# Patient Record
Sex: MALE | Race: WHITE | Employment: OTHER | URBAN - METROPOLITAN AREA
[De-identification: names, ages, dates, MRNs, and addresses within clinical notes are randomized per-mention and may not be internally consistent; named-entity substitution may affect disease eponyms.]

---

## 2018-08-22 LAB
CHOLESTEROL, TOTAL: 166 MG/DL
CHOLESTEROL/HDL RATIO: ABNORMAL
HDLC SERPL-MCNC: 33 MG/DL (ref 35–70)
LDL CHOLESTEROL CALCULATED: 116 MG/DL (ref 0–160)
TRIGL SERPL-MCNC: 86 MG/DL
VLDLC SERPL CALC-MCNC: 17 MG/DL

## 2019-03-12 ENCOUNTER — HOSPITAL ENCOUNTER (OUTPATIENT)
Dept: ULTRASOUND IMAGING | Age: 54
Discharge: HOME | End: 2019-03-12
Payer: MEDICARE

## 2019-03-12 DIAGNOSIS — R31.0 CLOT HEMATURIA: ICD-10-CM

## 2019-03-12 PROCEDURE — 76770 US EXAM ABDO BACK WALL COMP: CPT

## 2019-04-03 LAB
ANION GAP SERPL CALCULATED.3IONS-SCNC: 14 MMOL/L (ref 3–16)
BUN BLDV-MCNC: 13 MG/DL (ref 7–20)
CALCIUM SERPL-MCNC: 9.7 MG/DL (ref 8.3–10.6)
CHLORIDE BLD-SCNC: 102 MMOL/L (ref 99–110)
CO2: 26 MMOL/L (ref 21–32)
CREAT SERPL-MCNC: <0.5 MG/DL (ref 0.9–1.3)
GFR AFRICAN AMERICAN: >60
GFR NON-AFRICAN AMERICAN: >60
GLUCOSE FASTING: 96 MG/DL (ref 70–99)
POTASSIUM SERPL-SCNC: 4.9 MMOL/L (ref 3.5–5.1)
PROSTATE SPECIFIC ANTIGEN: 29.51 NG/ML (ref 0–4)
SODIUM BLD-SCNC: 142 MMOL/L (ref 136–145)

## 2019-04-30 LAB — HEMOGLOBIN: 14.1 G/DL (ref 13.5–17.5)

## 2019-05-01 ENCOUNTER — TELEPHONE (OUTPATIENT)
Dept: FAMILY MEDICINE CLINIC | Age: 54
End: 2019-05-01

## 2019-05-14 ENCOUNTER — HOSPITAL ENCOUNTER (OUTPATIENT)
Age: 54
Setting detail: SPECIMEN
Discharge: HOME | End: 2019-05-14
Payer: MEDICARE

## 2019-05-14 PROCEDURE — 88342 IMHCHEM/IMCYTCHM 1ST ANTB: CPT

## 2019-05-14 PROCEDURE — 88341 IMHCHEM/IMCYTCHM EA ADD ANTB: CPT

## 2019-06-06 ENCOUNTER — HOSPITAL ENCOUNTER (OUTPATIENT)
Dept: NUCLEAR MEDICINE | Age: 54
Discharge: HOME | End: 2019-06-06
Payer: MEDICARE

## 2019-06-06 DIAGNOSIS — C61 MALIGNANT NEOPLASM OF PROSTATE (HCC): ICD-10-CM

## 2019-06-06 PROCEDURE — 78306 BONE IMAGING WHOLE BODY: CPT

## 2019-06-06 PROCEDURE — 3430000000 HC RX DIAGNOSTIC RADIOPHARMACEUTICAL: Performed by: SPECIALIST

## 2019-06-06 PROCEDURE — A9503 TC99M MEDRONATE: HCPCS | Performed by: SPECIALIST

## 2019-06-06 RX ORDER — TC 99M MEDRONATE 20 MG/10ML
27.3 INJECTION, POWDER, LYOPHILIZED, FOR SOLUTION INTRAVENOUS
Status: COMPLETED | OUTPATIENT
Start: 2019-06-06 | End: 2019-06-06

## 2019-06-06 RX ADMIN — TC 99M MEDRONATE 27.3 MILLICURIE: 20 INJECTION, POWDER, LYOPHILIZED, FOR SOLUTION INTRAVENOUS at 10:07

## 2019-07-23 ENCOUNTER — HOSPITAL ENCOUNTER (OUTPATIENT)
Age: 54
Setting detail: SPECIMEN
Discharge: HOME | End: 2019-07-23
Payer: MEDICARE

## 2019-07-23 LAB
ALBUMIN SERPL-MCNC: 4.8 GM/DL (ref 3.4–5)
ALP BLD-CCNC: 98 IU/L (ref 40–129)
ALT SERPL-CCNC: 28 U/L (ref 10–40)
ANION GAP SERPL CALCULATED.3IONS-SCNC: 12 MMOL/L (ref 4–16)
AST SERPL-CCNC: 22 IU/L (ref 15–37)
BILIRUB SERPL-MCNC: 0.7 MG/DL (ref 0–1)
BUN BLDV-MCNC: 12 MG/DL (ref 6–23)
CALCIUM SERPL-MCNC: 9.7 MG/DL (ref 8.3–10.6)
CHLORIDE BLD-SCNC: 101 MMOL/L (ref 99–110)
CO2: 25 MMOL/L (ref 21–32)
CREAT SERPL-MCNC: 0.3 MG/DL (ref 0.9–1.3)
GFR AFRICAN AMERICAN: >60 ML/MIN/1.73M2
GFR NON-AFRICAN AMERICAN: >60 ML/MIN/1.73M2
GLUCOSE BLD-MCNC: 84 MG/DL (ref 70–99)
POTASSIUM SERPL-SCNC: 4.2 MMOL/L (ref 3.5–5.1)
SODIUM BLD-SCNC: 138 MMOL/L (ref 135–145)
TOTAL PROTEIN: 7.4 GM/DL (ref 6.4–8.2)

## 2019-07-23 PROCEDURE — 80053 COMPREHEN METABOLIC PANEL: CPT

## 2019-08-15 ENCOUNTER — HOSPITAL ENCOUNTER (OUTPATIENT)
Age: 54
Setting detail: SPECIMEN
Discharge: HOME | End: 2019-08-15
Payer: MEDICARE

## 2019-08-15 LAB
ALBUMIN SERPL-MCNC: 4.9 GM/DL (ref 3.4–5)
ALP BLD-CCNC: 114 IU/L (ref 40–128)
ALT SERPL-CCNC: 30 U/L (ref 10–40)
ANION GAP SERPL CALCULATED.3IONS-SCNC: 13 MMOL/L (ref 4–16)
AST SERPL-CCNC: 26 IU/L (ref 15–37)
BILIRUB SERPL-MCNC: 0.8 MG/DL (ref 0–1)
BUN BLDV-MCNC: 11 MG/DL (ref 6–23)
CALCIUM SERPL-MCNC: 10 MG/DL (ref 8.3–10.6)
CHLORIDE BLD-SCNC: 99 MMOL/L (ref 99–110)
CO2: 27 MMOL/L (ref 21–32)
CREAT SERPL-MCNC: 0.3 MG/DL (ref 0.9–1.3)
GFR AFRICAN AMERICAN: >60 ML/MIN/1.73M2
GFR NON-AFRICAN AMERICAN: >60 ML/MIN/1.73M2
GLUCOSE FASTING: 89 MG/DL (ref 70–99)
POTASSIUM SERPL-SCNC: 4.1 MMOL/L (ref 3.5–5.1)
SODIUM BLD-SCNC: 139 MMOL/L (ref 135–145)
TOTAL PROTEIN: 7.1 GM/DL (ref 6.4–8.2)

## 2019-08-15 PROCEDURE — 80053 COMPREHEN METABOLIC PANEL: CPT

## 2019-08-22 ENCOUNTER — HOSPITAL ENCOUNTER (OUTPATIENT)
Age: 54
Setting detail: SPECIMEN
Discharge: HOME | End: 2019-08-22
Payer: MEDICARE

## 2019-08-22 PROCEDURE — 84153 ASSAY OF PSA TOTAL: CPT

## 2019-08-22 PROCEDURE — 84154 ASSAY OF PSA FREE: CPT

## 2019-08-24 LAB
PROSTATE SPECIFIC ANTIGEN FREE: 0.1 NG/ML
PROSTATE SPECIFIC ANTIGEN PERCENT FREE: 11 %
PROSTATE SPECIFIC ANTIGEN PERCENT FREE: NORMAL %
PROSTATE SPECIFIC ANTIGEN: 0.9 NG/ML (ref 0–4)
PROSTATE SPECIFIC ANTIGEN: NORMAL NG/ML (ref 0–4)

## 2019-10-24 ENCOUNTER — OFFICE VISIT (OUTPATIENT)
Dept: FAMILY MEDICINE CLINIC | Age: 54
End: 2019-10-24
Payer: MEDICARE

## 2019-10-24 VITALS — DIASTOLIC BLOOD PRESSURE: 78 MMHG | HEART RATE: 72 BPM | OXYGEN SATURATION: 98 % | SYSTOLIC BLOOD PRESSURE: 120 MMHG

## 2019-10-24 DIAGNOSIS — Z23 INFLUENZA VACCINE NEEDED: ICD-10-CM

## 2019-10-24 DIAGNOSIS — C79.51 PROSTATE CANCER METASTATIC TO BONE (HCC): ICD-10-CM

## 2019-10-24 DIAGNOSIS — G71.00 MUSCULAR DYSTROPHY (HCC): Primary | ICD-10-CM

## 2019-10-24 DIAGNOSIS — E78.2 MIXED HYPERLIPIDEMIA: ICD-10-CM

## 2019-10-24 DIAGNOSIS — Z23 NEED FOR DIPHTHERIA-TETANUS-PERTUSSIS (TDAP) VACCINE: ICD-10-CM

## 2019-10-24 DIAGNOSIS — C61 PROSTATE CANCER METASTATIC TO BONE (HCC): ICD-10-CM

## 2019-10-24 PROBLEM — C67.9 BLADDER CANCER (HCC): Status: ACTIVE | Noted: 2019-10-24

## 2019-10-24 PROBLEM — C67.9 BLADDER CANCER (HCC): Chronic | Status: ACTIVE | Noted: 2019-10-24

## 2019-10-24 PROCEDURE — 90471 IMMUNIZATION ADMIN: CPT | Performed by: PHYSICIAN ASSISTANT

## 2019-10-24 PROCEDURE — 99213 OFFICE O/P EST LOW 20 MIN: CPT | Performed by: PHYSICIAN ASSISTANT

## 2019-10-24 PROCEDURE — 1036F TOBACCO NON-USER: CPT | Performed by: PHYSICIAN ASSISTANT

## 2019-10-24 PROCEDURE — 90715 TDAP VACCINE 7 YRS/> IM: CPT | Performed by: PHYSICIAN ASSISTANT

## 2019-10-24 PROCEDURE — G8482 FLU IMMUNIZE ORDER/ADMIN: HCPCS | Performed by: PHYSICIAN ASSISTANT

## 2019-10-24 PROCEDURE — G8427 DOCREV CUR MEDS BY ELIG CLIN: HCPCS | Performed by: PHYSICIAN ASSISTANT

## 2019-10-24 PROCEDURE — G0008 ADMIN INFLUENZA VIRUS VAC: HCPCS | Performed by: PHYSICIAN ASSISTANT

## 2019-10-24 PROCEDURE — 90686 IIV4 VACC NO PRSV 0.5 ML IM: CPT | Performed by: PHYSICIAN ASSISTANT

## 2019-10-24 PROCEDURE — G8421 BMI NOT CALCULATED: HCPCS | Performed by: PHYSICIAN ASSISTANT

## 2019-10-24 PROCEDURE — 3017F COLORECTAL CA SCREEN DOC REV: CPT | Performed by: PHYSICIAN ASSISTANT

## 2019-10-24 RX ORDER — ABIRATERONE ACETATE 250 MG/1
1000 TABLET ORAL DAILY
COMMUNITY
End: 2020-09-22 | Stop reason: SDUPTHER

## 2019-10-24 RX ORDER — PREDNISONE 1 MG/1
5 TABLET ORAL DAILY
COMMUNITY
End: 2021-04-26 | Stop reason: SDUPTHER

## 2019-10-24 ASSESSMENT — PATIENT HEALTH QUESTIONNAIRE - PHQ9
SUM OF ALL RESPONSES TO PHQ QUESTIONS 1-9: 0
SUM OF ALL RESPONSES TO PHQ9 QUESTIONS 1 & 2: 0
SUM OF ALL RESPONSES TO PHQ QUESTIONS 1-9: 0
1. LITTLE INTEREST OR PLEASURE IN DOING THINGS: 0
2. FEELING DOWN, DEPRESSED OR HOPELESS: 0

## 2019-10-24 ASSESSMENT — ENCOUNTER SYMPTOMS
SHORTNESS OF BREATH: 0
WHEEZING: 0
COUGH: 0

## 2019-10-30 ENCOUNTER — HOSPITAL ENCOUNTER (OUTPATIENT)
Age: 54
Discharge: HOME | End: 2019-10-30
Payer: MEDICARE

## 2019-10-30 ENCOUNTER — HOSPITAL ENCOUNTER (OUTPATIENT)
Dept: NUCLEAR MEDICINE | Age: 54
Discharge: HOME | End: 2019-10-30
Payer: MEDICARE

## 2019-10-30 ENCOUNTER — HOSPITAL ENCOUNTER (OUTPATIENT)
Dept: CT IMAGING | Age: 54
Discharge: HOME | End: 2019-10-30
Payer: MEDICARE

## 2019-10-30 DIAGNOSIS — C61 PROSTATE CANCER (HCC): ICD-10-CM

## 2019-10-30 DIAGNOSIS — C79.51 BONE METASTASIS (HCC): ICD-10-CM

## 2019-10-30 LAB
ALBUMIN SERPL-MCNC: 4.5 GM/DL (ref 3.4–5)
ALP BLD-CCNC: 83 IU/L (ref 40–128)
ALT SERPL-CCNC: 27 U/L (ref 10–40)
ANION GAP SERPL CALCULATED.3IONS-SCNC: 11 MMOL/L (ref 4–16)
ANISOCYTOSIS: ABNORMAL
AST SERPL-CCNC: 29 IU/L (ref 15–37)
BANDED NEUTROPHILS ABSOLUTE COUNT: 0.39 K/CU MM
BANDED NEUTROPHILS RELATIVE PERCENT: 6 % (ref 5–11)
BILIRUB SERPL-MCNC: 0.7 MG/DL (ref 0–1)
BUN BLDV-MCNC: 9 MG/DL (ref 6–23)
CALCIUM SERPL-MCNC: 9.6 MG/DL (ref 8.3–10.6)
CHLORIDE BLD-SCNC: 100 MMOL/L (ref 99–110)
CO2: 26 MMOL/L (ref 21–32)
CREAT SERPL-MCNC: 0.3 MG/DL (ref 0.9–1.3)
DIFFERENTIAL TYPE: ABNORMAL
EOSINOPHILS ABSOLUTE: 0.3 K/CU MM
EOSINOPHILS RELATIVE PERCENT: 4 % (ref 0–3)
GFR AFRICAN AMERICAN: >60 ML/MIN/1.73M2
GFR NON-AFRICAN AMERICAN: >60 ML/MIN/1.73M2
GLUCOSE BLD-MCNC: 89 MG/DL (ref 70–99)
HCT VFR BLD CALC: 41 % (ref 42–52)
HEMOGLOBIN: 13.4 GM/DL (ref 13.5–18)
LYMPHOCYTES ABSOLUTE: 2.1 K/CU MM
LYMPHOCYTES RELATIVE PERCENT: 32 % (ref 24–44)
MCH RBC QN AUTO: 30.9 PG (ref 27–31)
MCHC RBC AUTO-ENTMCNC: 32.7 % (ref 32–36)
MCV RBC AUTO: 94.5 FL (ref 78–100)
MONOCYTES ABSOLUTE: 0.5 K/CU MM
MONOCYTES RELATIVE PERCENT: 7 % (ref 0–4)
PDW BLD-RTO: 12.9 % (ref 11.7–14.9)
PLATELET # BLD: 172 K/CU MM (ref 140–440)
PMV BLD AUTO: 11.6 FL (ref 7.5–11.1)
POTASSIUM SERPL-SCNC: 3.9 MMOL/L (ref 3.5–5.1)
RBC # BLD: 4.34 M/CU MM (ref 4.6–6.2)
RBC # BLD: ABNORMAL 10*6/UL
SEGMENTED NEUTROPHILS ABSOLUTE COUNT: 3.2 K/CU MM
SEGMENTED NEUTROPHILS RELATIVE PERCENT: 51 % (ref 36–66)
SODIUM BLD-SCNC: 137 MMOL/L (ref 135–145)
TOTAL PROTEIN: 6.8 GM/DL (ref 6.4–8.2)
WBC # BLD: 6.5 K/CU MM (ref 4–10.5)
WBC # BLD: ABNORMAL 10*3/UL

## 2019-10-30 PROCEDURE — 84154 ASSAY OF PSA FREE: CPT

## 2019-10-30 PROCEDURE — 84153 ASSAY OF PSA TOTAL: CPT

## 2019-10-30 PROCEDURE — 74177 CT ABD & PELVIS W/CONTRAST: CPT

## 2019-10-30 PROCEDURE — 36415 COLL VENOUS BLD VENIPUNCTURE: CPT

## 2019-10-30 PROCEDURE — 80053 COMPREHEN METABOLIC PANEL: CPT

## 2019-10-30 PROCEDURE — 6360000004 HC RX CONTRAST MEDICATION: Performed by: INTERNAL MEDICINE

## 2019-10-30 PROCEDURE — 85007 BL SMEAR W/DIFF WBC COUNT: CPT

## 2019-10-30 PROCEDURE — A9503 TC99M MEDRONATE: HCPCS | Performed by: INTERNAL MEDICINE

## 2019-10-30 PROCEDURE — 2580000003 HC RX 258: Performed by: INTERNAL MEDICINE

## 2019-10-30 PROCEDURE — 3430000000 HC RX DIAGNOSTIC RADIOPHARMACEUTICAL: Performed by: INTERNAL MEDICINE

## 2019-10-30 PROCEDURE — 78306 BONE IMAGING WHOLE BODY: CPT

## 2019-10-30 PROCEDURE — 85027 COMPLETE CBC AUTOMATED: CPT

## 2019-10-30 RX ORDER — TC 99M MEDRONATE 20 MG/10ML
25 INJECTION, POWDER, LYOPHILIZED, FOR SOLUTION INTRAVENOUS
Status: COMPLETED | OUTPATIENT
Start: 2019-10-30 | End: 2019-10-30

## 2019-10-30 RX ORDER — 0.9 % SODIUM CHLORIDE 0.9 %
10 VIAL (ML) INJECTION
Status: COMPLETED | OUTPATIENT
Start: 2019-10-30 | End: 2019-10-30

## 2019-10-30 RX ADMIN — IOPAMIDOL 75 ML: 755 INJECTION, SOLUTION INTRAVENOUS at 13:14

## 2019-10-30 RX ADMIN — Medication 10 ML: at 13:15

## 2019-10-30 RX ADMIN — IOHEXOL 50 ML: 240 INJECTION, SOLUTION INTRATHECAL; INTRAVASCULAR; INTRAVENOUS; ORAL at 13:14

## 2019-10-30 RX ADMIN — TC 99M MEDRONATE 25 MILLICURIE: 20 INJECTION, POWDER, LYOPHILIZED, FOR SOLUTION INTRAVENOUS at 10:50

## 2019-11-01 LAB
PROSTATE SPECIFIC ANTIGEN FREE: <0.1 NG/ML
PROSTATE SPECIFIC ANTIGEN PERCENT FREE: <50 %
PROSTATE SPECIFIC ANTIGEN PERCENT FREE: NORMAL %
PROSTATE SPECIFIC ANTIGEN: 0.2 NG/ML (ref 0–4)
PROSTATE SPECIFIC ANTIGEN: NORMAL NG/ML (ref 0–4)

## 2019-11-06 ENCOUNTER — HOSPITAL ENCOUNTER (OUTPATIENT)
Age: 54
Discharge: HOME | End: 2019-11-06
Payer: MEDICARE

## 2019-11-06 DIAGNOSIS — C79.51 PROSTATE CANCER METASTATIC TO BONE (HCC): ICD-10-CM

## 2019-11-06 DIAGNOSIS — C61 PROSTATE CANCER METASTATIC TO BONE (HCC): ICD-10-CM

## 2019-11-06 DIAGNOSIS — E78.2 MIXED HYPERLIPIDEMIA: ICD-10-CM

## 2019-11-06 LAB
BASOPHILS ABSOLUTE: 0 K/CU MM
BASOPHILS RELATIVE PERCENT: 0.8 % (ref 0–1)
CHOLESTEROL: 201 MG/DL
DIFFERENTIAL TYPE: ABNORMAL
EOSINOPHILS ABSOLUTE: 0.2 K/CU MM
EOSINOPHILS RELATIVE PERCENT: 3.8 % (ref 0–3)
HCT VFR BLD CALC: 41.1 % (ref 42–52)
HDLC SERPL-MCNC: 40 MG/DL
HEMOGLOBIN: 13.1 GM/DL (ref 13.5–18)
IMMATURE NEUTROPHIL %: 0.4 % (ref 0–0.43)
LDL CHOLESTEROL DIRECT: 146 MG/DL
LYMPHOCYTES ABSOLUTE: 1.9 K/CU MM
LYMPHOCYTES RELATIVE PERCENT: 37.5 % (ref 24–44)
MCH RBC QN AUTO: 31 PG (ref 27–31)
MCHC RBC AUTO-ENTMCNC: 31.9 % (ref 32–36)
MCV RBC AUTO: 97.4 FL (ref 78–100)
MONOCYTES ABSOLUTE: 0.3 K/CU MM
MONOCYTES RELATIVE PERCENT: 6.6 % (ref 0–4)
NUCLEATED RBC %: 0 %
PDW BLD-RTO: 13.2 % (ref 11.7–14.9)
PLATELET # BLD: 168 K/CU MM (ref 140–440)
PMV BLD AUTO: 12.3 FL (ref 7.5–11.1)
RBC # BLD: 4.22 M/CU MM (ref 4.6–6.2)
SEGMENTED NEUTROPHILS ABSOLUTE COUNT: 2.6 K/CU MM
SEGMENTED NEUTROPHILS RELATIVE PERCENT: 50.9 % (ref 36–66)
TOTAL IMMATURE NEUTOROPHIL: 0.02 K/CU MM
TOTAL NUCLEATED RBC: 0 K/CU MM
TRIGL SERPL-MCNC: 113 MG/DL
TSH HIGH SENSITIVITY: 1.81 UIU/ML (ref 0.27–4.2)
WBC # BLD: 5 K/CU MM (ref 4–10.5)

## 2019-11-06 PROCEDURE — 36415 COLL VENOUS BLD VENIPUNCTURE: CPT

## 2019-11-06 PROCEDURE — 85025 COMPLETE CBC W/AUTO DIFF WBC: CPT

## 2019-11-06 PROCEDURE — 83721 ASSAY OF BLOOD LIPOPROTEIN: CPT

## 2019-11-06 PROCEDURE — 84443 ASSAY THYROID STIM HORMONE: CPT

## 2019-11-06 PROCEDURE — 80061 LIPID PANEL: CPT

## 2020-01-21 ENCOUNTER — HOSPITAL ENCOUNTER (OUTPATIENT)
Age: 55
Discharge: HOME | End: 2020-01-21
Payer: MEDICARE

## 2020-01-21 ENCOUNTER — HOSPITAL ENCOUNTER (OUTPATIENT)
Dept: CT IMAGING | Age: 55
Discharge: HOME | End: 2020-01-21
Payer: MEDICARE

## 2020-01-21 LAB
ALBUMIN SERPL-MCNC: 4.3 GM/DL (ref 3.4–5)
ALP BLD-CCNC: 78 IU/L (ref 40–128)
ALT SERPL-CCNC: 21 U/L (ref 10–40)
ANION GAP SERPL CALCULATED.3IONS-SCNC: 11 MMOL/L (ref 4–16)
AST SERPL-CCNC: 24 IU/L (ref 15–37)
BANDED NEUTROPHILS ABSOLUTE COUNT: 0.1 K/CU MM
BANDED NEUTROPHILS RELATIVE PERCENT: 2 % (ref 5–11)
BASOPHILS ABSOLUTE: 0.1 K/CU MM
BASOPHILS RELATIVE PERCENT: 2 % (ref 0–1)
BILIRUB SERPL-MCNC: 0.5 MG/DL (ref 0–1)
BUN BLDV-MCNC: 7 MG/DL (ref 6–23)
CALCIUM SERPL-MCNC: 9 MG/DL (ref 8.3–10.6)
CHLORIDE BLD-SCNC: 101 MMOL/L (ref 99–110)
CO2: 26 MMOL/L (ref 21–32)
CREAT SERPL-MCNC: 0.3 MG/DL (ref 0.9–1.3)
DIFFERENTIAL TYPE: ABNORMAL
EOSINOPHILS ABSOLUTE: 0.1 K/CU MM
EOSINOPHILS RELATIVE PERCENT: 3 % (ref 0–3)
FERRITIN: 456 NG/ML (ref 30–400)
FOLATE: 12 NG/ML (ref 3.1–17.5)
GFR AFRICAN AMERICAN: >60 ML/MIN/1.73M2
GFR AFRICAN AMERICAN: >60 ML/MIN/1.73M2
GFR NON-AFRICAN AMERICAN: >60 ML/MIN/1.73M2
GFR NON-AFRICAN AMERICAN: >60 ML/MIN/1.73M2
GLUCOSE BLD-MCNC: 85 MG/DL (ref 70–99)
HCT VFR BLD CALC: 40.2 % (ref 42–52)
HEMOGLOBIN: 12.9 GM/DL (ref 13.5–18)
IRON: 65 UG/DL (ref 59–158)
LYMPHOCYTES ABSOLUTE: 2 K/CU MM
LYMPHOCYTES RELATIVE PERCENT: 40 % (ref 24–44)
MCH RBC QN AUTO: 30.4 PG (ref 27–31)
MCHC RBC AUTO-ENTMCNC: 32.1 % (ref 32–36)
MCV RBC AUTO: 94.8 FL (ref 78–100)
MONOCYTES ABSOLUTE: 0.3 K/CU MM
MONOCYTES RELATIVE PERCENT: 6 % (ref 0–4)
PCT TRANSFERRIN: 23 % (ref 10–44)
PDW BLD-RTO: 12.7 % (ref 11.7–14.9)
PLATELET # BLD: 145 K/CU MM (ref 140–440)
PLT MORPHOLOGY: ABNORMAL
PMV BLD AUTO: 12.6 FL (ref 7.5–11.1)
POC CREATININE: 0.5 MG/DL (ref 0.9–1.3)
POLYCHROMASIA: ABNORMAL
POTASSIUM SERPL-SCNC: 3.9 MMOL/L (ref 3.5–5.1)
PROSTATE SPECIFIC ANTIGEN: 0.08 NG/ML (ref 0–4)
RBC # BLD: 4.24 M/CU MM (ref 4.6–6.2)
SEGMENTED NEUTROPHILS ABSOLUTE COUNT: 2.3 K/CU MM
SEGMENTED NEUTROPHILS RELATIVE PERCENT: 47 % (ref 36–66)
SODIUM BLD-SCNC: 138 MMOL/L (ref 135–145)
TOTAL IRON BINDING CAPACITY: 285 UG/DL (ref 250–450)
TOTAL PROTEIN: 6.4 GM/DL (ref 6.4–8.2)
UNSATURATED IRON BINDING CAPACITY: 220 UG/DL (ref 110–370)
VITAMIN B-12: 196.3 PG/ML (ref 211–911)
WBC # BLD: 4.9 K/CU MM (ref 4–10.5)
WBC # BLD: ABNORMAL 10*3/UL

## 2020-01-21 PROCEDURE — 36415 COLL VENOUS BLD VENIPUNCTURE: CPT

## 2020-01-21 PROCEDURE — 82607 VITAMIN B-12: CPT

## 2020-01-21 PROCEDURE — G0103 PSA SCREENING: HCPCS

## 2020-01-21 PROCEDURE — 6360000004 HC RX CONTRAST MEDICATION: Performed by: INTERNAL MEDICINE

## 2020-01-21 PROCEDURE — 82728 ASSAY OF FERRITIN: CPT

## 2020-01-21 PROCEDURE — 85007 BL SMEAR W/DIFF WBC COUNT: CPT

## 2020-01-21 PROCEDURE — 83540 ASSAY OF IRON: CPT

## 2020-01-21 PROCEDURE — 82746 ASSAY OF FOLIC ACID SERUM: CPT

## 2020-01-21 PROCEDURE — 71260 CT THORAX DX C+: CPT

## 2020-01-21 PROCEDURE — 85027 COMPLETE CBC AUTOMATED: CPT

## 2020-01-21 PROCEDURE — 83550 IRON BINDING TEST: CPT

## 2020-01-21 PROCEDURE — 80053 COMPREHEN METABOLIC PANEL: CPT

## 2020-01-21 RX ADMIN — IOPAMIDOL 75 ML: 755 INJECTION, SOLUTION INTRAVENOUS at 08:33

## 2020-01-30 ENCOUNTER — HOSPITAL ENCOUNTER (OUTPATIENT)
Dept: INFUSION THERAPY | Age: 55
Discharge: HOME | End: 2020-01-30
Payer: MEDICARE

## 2020-01-30 PROCEDURE — 96372 THER/PROPH/DIAG INJ SC/IM: CPT

## 2020-01-30 PROCEDURE — 6360000002 HC RX W HCPCS: Performed by: INTERNAL MEDICINE

## 2020-01-30 PROCEDURE — 96402 CHEMO HORMON ANTINEOPL SQ/IM: CPT

## 2020-01-30 RX ORDER — CYANOCOBALAMIN 1000 UG/ML
1000 INJECTION INTRAMUSCULAR; SUBCUTANEOUS ONCE
Status: DISCONTINUED | OUTPATIENT
Start: 2020-01-30 | End: 2020-01-31 | Stop reason: HOSPADM

## 2020-02-06 ENCOUNTER — HOSPITAL ENCOUNTER (OUTPATIENT)
Dept: INFUSION THERAPY | Age: 55
Discharge: HOME | End: 2020-02-06
Payer: MEDICARE

## 2020-02-06 PROCEDURE — 6360000002 HC RX W HCPCS

## 2020-02-06 PROCEDURE — 96372 THER/PROPH/DIAG INJ SC/IM: CPT

## 2020-02-06 RX ORDER — CYANOCOBALAMIN 1000 UG/ML
INJECTION INTRAMUSCULAR; SUBCUTANEOUS
Status: DISCONTINUED
Start: 2020-02-06 | End: 2020-02-07 | Stop reason: HOSPADM

## 2020-02-06 RX ORDER — CYANOCOBALAMIN 1000 UG/ML
1000 INJECTION INTRAMUSCULAR; SUBCUTANEOUS ONCE
Status: DISCONTINUED | OUTPATIENT
Start: 2020-02-06 | End: 2020-02-07 | Stop reason: HOSPADM

## 2020-02-13 ENCOUNTER — HOSPITAL ENCOUNTER (OUTPATIENT)
Dept: INFUSION THERAPY | Age: 55
Discharge: HOME | End: 2020-02-13
Payer: MEDICARE

## 2020-02-13 PROCEDURE — 6360000002 HC RX W HCPCS

## 2020-02-13 PROCEDURE — 96372 THER/PROPH/DIAG INJ SC/IM: CPT

## 2020-02-13 PROCEDURE — 99214 OFFICE O/P EST MOD 30 MIN: CPT

## 2020-02-13 RX ORDER — CYANOCOBALAMIN 1000 UG/ML
1000 INJECTION INTRAMUSCULAR; SUBCUTANEOUS ONCE
Status: DISCONTINUED | OUTPATIENT
Start: 2020-02-13 | End: 2020-02-14 | Stop reason: HOSPADM

## 2020-02-13 RX ORDER — CYANOCOBALAMIN 1000 UG/ML
INJECTION INTRAMUSCULAR; SUBCUTANEOUS
Status: DISPENSED
Start: 2020-02-13 | End: 2020-02-13

## 2020-03-31 PROBLEM — D64.9 ANEMIA, UNSPECIFIED: Status: ACTIVE | Noted: 2020-03-31

## 2020-04-27 ENCOUNTER — HOSPITAL ENCOUNTER (OUTPATIENT)
Dept: CT IMAGING | Age: 55
Discharge: HOME | End: 2020-04-27
Payer: MEDICARE

## 2020-04-27 LAB
ALBUMIN SERPL-MCNC: 4.1 GM/DL (ref 3.4–5)
ALP BLD-CCNC: 65 IU/L (ref 40–129)
ALT SERPL-CCNC: 22 U/L (ref 10–40)
ANION GAP SERPL CALCULATED.3IONS-SCNC: 13 MMOL/L (ref 4–16)
AST SERPL-CCNC: 21 IU/L (ref 15–37)
BASOPHILS ABSOLUTE: 0 K/CU MM
BASOPHILS RELATIVE PERCENT: 1 % (ref 0–1)
BILIRUB SERPL-MCNC: 0.4 MG/DL (ref 0–1)
BUN BLDV-MCNC: 8 MG/DL (ref 6–23)
CALCIUM SERPL-MCNC: 8.7 MG/DL (ref 8.3–10.6)
CHLORIDE BLD-SCNC: 102 MMOL/L (ref 99–110)
CO2: 26 MMOL/L (ref 21–32)
CREAT SERPL-MCNC: 0.3 MG/DL (ref 0.9–1.3)
DIFFERENTIAL TYPE: ABNORMAL
EOSINOPHILS ABSOLUTE: 0.1 K/CU MM
EOSINOPHILS RELATIVE PERCENT: 2 % (ref 0–3)
FERRITIN: 307 NG/ML (ref 30–400)
GFR AFRICAN AMERICAN: >60 ML/MIN/1.73M2
GFR AFRICAN AMERICAN: >60 ML/MIN/1.73M2
GFR NON-AFRICAN AMERICAN: >60 ML/MIN/1.73M2
GFR NON-AFRICAN AMERICAN: >60 ML/MIN/1.73M2
GLUCOSE BLD-MCNC: 83 MG/DL (ref 70–99)
HCT VFR BLD CALC: 40.1 % (ref 42–52)
HEMOGLOBIN: 12.6 GM/DL (ref 13.5–18)
LYMPHOCYTES ABSOLUTE: 1.7 K/CU MM
LYMPHOCYTES RELATIVE PERCENT: 36 % (ref 24–44)
MCH RBC QN AUTO: 30.4 PG (ref 27–31)
MCHC RBC AUTO-ENTMCNC: 31.4 % (ref 32–36)
MCV RBC AUTO: 96.9 FL (ref 78–100)
MONOCYTES ABSOLUTE: 0.3 K/CU MM
MONOCYTES RELATIVE PERCENT: 6 % (ref 0–4)
PDW BLD-RTO: 12.5 % (ref 11.7–14.9)
PLATELET # BLD: 132 K/CU MM (ref 140–440)
PLT MORPHOLOGY: ABNORMAL
PMV BLD AUTO: 12.5 FL (ref 7.5–11.1)
POC CREATININE: 0.4 MG/DL (ref 0.9–1.3)
POTASSIUM SERPL-SCNC: 3.5 MMOL/L (ref 3.5–5.1)
PROSTATE SPECIFIC ANTIGEN: 0.04 NG/ML (ref 0–4)
RBC # BLD: 4.14 M/CU MM (ref 4.6–6.2)
SEGMENTED NEUTROPHILS ABSOLUTE COUNT: 2.7 K/CU MM
SEGMENTED NEUTROPHILS RELATIVE PERCENT: 55 % (ref 36–66)
SODIUM BLD-SCNC: 141 MMOL/L (ref 135–145)
TOTAL PROTEIN: 6 GM/DL (ref 6.4–8.2)
VITAMIN B-12: 311.6 PG/ML (ref 211–911)
WBC # BLD: 4.8 K/CU MM (ref 4–10.5)

## 2020-04-27 PROCEDURE — 6360000004 HC RX CONTRAST MEDICATION: Performed by: INTERNAL MEDICINE

## 2020-04-27 PROCEDURE — 82607 VITAMIN B-12: CPT

## 2020-04-27 PROCEDURE — 80053 COMPREHEN METABOLIC PANEL: CPT

## 2020-04-27 PROCEDURE — 82728 ASSAY OF FERRITIN: CPT

## 2020-04-27 PROCEDURE — 85027 COMPLETE CBC AUTOMATED: CPT

## 2020-04-27 PROCEDURE — 36415 COLL VENOUS BLD VENIPUNCTURE: CPT

## 2020-04-27 PROCEDURE — 84153 ASSAY OF PSA TOTAL: CPT

## 2020-04-27 PROCEDURE — 85007 BL SMEAR W/DIFF WBC COUNT: CPT

## 2020-04-27 PROCEDURE — G0103 PSA SCREENING: HCPCS

## 2020-04-27 PROCEDURE — 71260 CT THORAX DX C+: CPT

## 2020-04-27 RX ADMIN — IOPAMIDOL 75 ML: 755 INJECTION, SOLUTION INTRAVENOUS at 09:38

## 2020-05-14 ENCOUNTER — HOSPITAL ENCOUNTER (OUTPATIENT)
Dept: INFUSION THERAPY | Age: 55
Discharge: HOME | End: 2020-05-14
Payer: MEDICARE

## 2020-05-14 PROCEDURE — 99211 OFF/OP EST MAY X REQ PHY/QHP: CPT

## 2020-05-26 ENCOUNTER — PATIENT MESSAGE (OUTPATIENT)
Dept: FAMILY MEDICINE CLINIC | Age: 55
End: 2020-05-26

## 2020-05-26 NOTE — TELEPHONE ENCOUNTER
From: Roni Barone  To: Gal Gomez MD  Sent: 5/26/2020 9:48 AM EDT  Subject: Non-Urgent Medical Question    [employer] sent me another Attending [phone] Statement for my disability to be completed. Please could you get this completed as soon as you and let me know if you can email it to me ([email or web address]) or send it thru [address] I appreciate your time in the matter. Thank you.   Philomena Borrego

## 2020-05-26 NOTE — TELEPHONE ENCOUNTER
Call ptJoseph Costa I have not received any forms to complete yet. I have read and responded to all the correspondences I have received so far.

## 2020-06-16 ENCOUNTER — HOSPITAL ENCOUNTER (OUTPATIENT)
Dept: INFUSION THERAPY | Age: 55
Discharge: HOME | End: 2020-06-16
Payer: MEDICARE

## 2020-06-16 PROCEDURE — 96402 CHEMO HORMON ANTINEOPL SQ/IM: CPT

## 2020-06-16 PROCEDURE — 6360000002 HC RX W HCPCS: Performed by: INTERNAL MEDICINE

## 2020-06-22 PROBLEM — Z71.83 ENCOUNTER FOR NONPROCREATIVE GENETIC COUNSELING: Status: ACTIVE | Noted: 2020-06-22

## 2020-06-22 PROBLEM — Z80.3 FAMILY HISTORY OF MALIGNANT NEOPLASM OF BREAST: Status: ACTIVE | Noted: 2020-06-22

## 2020-06-22 PROBLEM — C61 MALIGNANT NEOPLASM OF PROSTATE (HCC): Status: ACTIVE | Noted: 2020-06-22

## 2020-06-22 PROBLEM — J84.9 INTERSTITIAL PULMONARY DISEASE, UNSPECIFIED (HCC): Status: ACTIVE | Noted: 2020-06-22

## 2020-08-07 ENCOUNTER — HOSPITAL ENCOUNTER (OUTPATIENT)
Dept: CT IMAGING | Age: 55
Discharge: HOME | End: 2020-08-07
Payer: MEDICARE

## 2020-08-07 ENCOUNTER — HOSPITAL ENCOUNTER (OUTPATIENT)
Age: 55
Discharge: HOME | End: 2020-08-07
Payer: MEDICARE

## 2020-08-07 LAB
ALBUMIN SERPL-MCNC: 4.8 GM/DL (ref 3.4–5)
ALP BLD-CCNC: 90 IU/L (ref 40–129)
ALT SERPL-CCNC: 28 U/L (ref 10–40)
ANION GAP SERPL CALCULATED.3IONS-SCNC: 15 MMOL/L (ref 4–16)
AST SERPL-CCNC: 37 IU/L (ref 15–37)
BASOPHILS ABSOLUTE: 0 K/CU MM
BASOPHILS RELATIVE PERCENT: 0.5 % (ref 0–1)
BILIRUB SERPL-MCNC: 0.5 MG/DL (ref 0–1)
BUN BLDV-MCNC: 13 MG/DL (ref 6–23)
CALCIUM SERPL-MCNC: 9.5 MG/DL (ref 8.3–10.6)
CHLORIDE BLD-SCNC: 99 MMOL/L (ref 99–110)
CO2: 27 MMOL/L (ref 21–32)
CREAT SERPL-MCNC: 0.4 MG/DL (ref 0.9–1.3)
DIFFERENTIAL TYPE: ABNORMAL
EOSINOPHILS ABSOLUTE: 0.4 K/CU MM
EOSINOPHILS RELATIVE PERCENT: 5.9 % (ref 0–3)
FERRITIN: 408 NG/ML (ref 30–400)
GFR AFRICAN AMERICAN: >60 ML/MIN/1.73M2
GFR NON-AFRICAN AMERICAN: >60 ML/MIN/1.73M2
GLUCOSE BLD-MCNC: 82 MG/DL (ref 70–99)
HCT VFR BLD CALC: 42.6 % (ref 42–52)
HEMOGLOBIN: 13.7 GM/DL (ref 13.5–18)
IMMATURE NEUTROPHIL %: 0.3 % (ref 0–0.43)
LYMPHOCYTES ABSOLUTE: 1.9 K/CU MM
LYMPHOCYTES RELATIVE PERCENT: 32.5 % (ref 24–44)
MCH RBC QN AUTO: 30.6 PG (ref 27–31)
MCHC RBC AUTO-ENTMCNC: 32.2 % (ref 32–36)
MCV RBC AUTO: 95.3 FL (ref 78–100)
MONOCYTES ABSOLUTE: 0.4 K/CU MM
MONOCYTES RELATIVE PERCENT: 6.2 % (ref 0–4)
NUCLEATED RBC %: 0 %
PDW BLD-RTO: 13.2 % (ref 11.7–14.9)
PLATELET # BLD: 164 K/CU MM (ref 140–440)
PMV BLD AUTO: 12.8 FL (ref 7.5–11.1)
POTASSIUM SERPL-SCNC: 4.1 MMOL/L (ref 3.5–5.1)
PROSTATE SPECIFIC ANTIGEN: 0.01 NG/ML (ref 0–4)
RBC # BLD: 4.47 M/CU MM (ref 4.6–6.2)
SEGMENTED NEUTROPHILS ABSOLUTE COUNT: 3.2 K/CU MM
SEGMENTED NEUTROPHILS RELATIVE PERCENT: 54.6 % (ref 36–66)
SODIUM BLD-SCNC: 141 MMOL/L (ref 135–145)
TOTAL IMMATURE NEUTOROPHIL: 0.02 K/CU MM
TOTAL NUCLEATED RBC: 0 K/CU MM
TOTAL PROTEIN: 7 GM/DL (ref 6.4–8.2)
VITAMIN B-12: 352.4 PG/ML (ref 211–911)
WBC # BLD: 5.9 K/CU MM (ref 4–10.5)

## 2020-08-07 PROCEDURE — 71260 CT THORAX DX C+: CPT

## 2020-08-07 PROCEDURE — 6360000004 HC RX CONTRAST MEDICATION: Performed by: INTERNAL MEDICINE

## 2020-08-07 PROCEDURE — 82607 VITAMIN B-12: CPT

## 2020-08-07 PROCEDURE — 80053 COMPREHEN METABOLIC PANEL: CPT

## 2020-08-07 PROCEDURE — 85025 COMPLETE CBC W/AUTO DIFF WBC: CPT

## 2020-08-07 PROCEDURE — 36415 COLL VENOUS BLD VENIPUNCTURE: CPT

## 2020-08-07 PROCEDURE — 82728 ASSAY OF FERRITIN: CPT

## 2020-08-07 PROCEDURE — G0103 PSA SCREENING: HCPCS

## 2020-08-07 PROCEDURE — 2580000003 HC RX 258: Performed by: INTERNAL MEDICINE

## 2020-08-07 RX ORDER — SODIUM CHLORIDE 0.9 % (FLUSH) 0.9 %
10 SYRINGE (ML) INJECTION PRN
Status: DISCONTINUED | OUTPATIENT
Start: 2020-08-07 | End: 2020-08-08 | Stop reason: HOSPADM

## 2020-08-07 RX ADMIN — SODIUM CHLORIDE, PRESERVATIVE FREE 10 ML: 5 INJECTION INTRAVENOUS at 09:35

## 2020-08-07 RX ADMIN — IOPAMIDOL 75 ML: 755 INJECTION, SOLUTION INTRAVENOUS at 09:35

## 2020-08-19 ENCOUNTER — TELEPHONE (OUTPATIENT)
Dept: ONCOLOGY | Age: 55
End: 2020-08-19

## 2020-08-19 NOTE — TELEPHONE ENCOUNTER
Spoke with pt regarding itching and rash that he believes is a result of the prednisone. Suggested he try claritin and pepcid to treat the itching and rash. Pt will try and see if it resolves within a week.

## 2020-08-19 NOTE — TELEPHONE ENCOUNTER
Patient just left a message stating he is having an allergic reaction to Prednisone, would like for someone to call him right away

## 2020-08-28 NOTE — PROGRESS NOTES
Patient Name: Elise Rooney  Patient : 1965  Patient MRN: [id number]     Primary Oncologist: Jeri West MD  Referring Provider: Ermalinda Cooks, MD     Date of Service: 2020      Chief Complaint:   Chief Complaint   Patient presents with   [address]     He came in for follow-up visit. Patient Active Problem List:     Muscular dystrophy [facility])     Prostate cancer metastatic to bone ([facility] [address]     Anemia, unspecified     Malignant neoplasm of prostate ([facility] [address]     Family history of malignant neoplasm of breast     Encounter for nonprocreative genetic counseling     Interstitial pulmonary disease, unspecified ([facility] [address]     HPI:   Aaron Mullins is a pleasant 65-year-old  male patient was referred for this of the diagnosis of prostate cancer involving the bone, bladder and pelvic lymph node. Initially he presented with gross hematuria in 2019. He went to see family doctor and had an ultrasound of the bladder. 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 Invasive prostatic adenocarcinoma considered less likely. Daphney Long PSA in 2019 was 29.51.  CT abdomen and pelvis on 2019 showed 3.7 cm intraluminal bladder mass likely representing transitional cell carcinoma rather than extension of the prostate. Chest x-ray showed no acute infiltrative process. On May 14, 2019 he underwent cystoscopy with transurethral resection of the bladder tumor, 2 cm in size and transrectal ultrasound-guided prostate needle biopsy. Pathology report of left prostate biopsy showed adenocarcinoma Hunter score 4+4. Number cores positive out of 12. Proportion of prostatic tissue involved by tumor was 17%. Perineural invasion was seen. Pathology report of urinary bladder biopsy showed prostatic adenocarcinoma.   MRI of pelvis on 2019 showed large mass left peripheral zone emphysematous changes with no other acute cardiopulmonary findings. PSA in April 2020 was 0.04. Sister has given B-12 injection. CBC is stable. B-12 improved. He is due for lupron injection in July 2020 and to see Dr Ai Talavera. On September 2, 2020 he came in for follow-up visit. CT chest 8/7/2020:    Stable 7 mm pulmonary nodule seen within the right upper lobe recommend    follow-up CT scan in 1 year's time. PSA 0.01 in August 2020. Bone scan and CT abdomen would be optional for now unless his PSA starts rising or he has symptoms suggesting of progression of the disease. Juvenal Hanley He has nonspecific rest which she attributes to the prednisone. He takes only 5 mg a day. Weight has been fluctuating but stable. He takes Claritin. He is due for Lupron injection in September 2020. Spouse has given B12 injection at home. He denies any acute pain. No nausea, vomiting or diarrhea. No dysuria or hematuria. No cough or headache. Due to muscular dystrophy here he has been using wheelchair. Past Medical History  Prostate cancer Stacey score 8, Bronson's muscular dystrophy. Surgical History  Colonoscopy and extirpation of lesion of colon     Allergies  bupropion     Medications  Reviewed as per chart. Social History  He smoked 1 to 2 pack/day for more than 20 years and quit on August 22, 2012. He denies any alcohol or illicit drug use. He has twin sons. Family History  Father: Colorectal cancer  Mother: Breast cancer  Grandfather - Maternal (2nd Degree): Lung cancer  Grandmother - Maternal (2nd Degree): Lung cancer. Current Therapy  Abiraterone + Prednisone + Leuprolide Q3M Cycle Length: 84 Number Cycles: 8 Start: C1D1 on 07/17/2019 Assoc Dx: Prostate cancer LOT: Primary Treatment Stage: JOSE Treatment Intent: Dyyugujw22/30/2020 C1 D1  Leuprolide IM (3 month), 22.5 mg  Prednisone Oral, 5 mg  Abiraterone Oral, 1000 mg qday    Review of Systems:   \"Per interval history; otherwise 10 point ROS is 01/21/2020    WBCMORP OCCASIONAL  ATYPICAL LYMPH(S) NOTED   01/21/2020    PLTM FEW 04/27/2020     Chemistry:  Lab Results   Component Value Date     08/07/2020    K 4.1 08/07/2020    CL 99 08/07/2020    CO2 27 08/07/2020    BUN 13 08/07/2020    CREATININE 0.4 (L) 08/07/2020    GLUCOSE 82 08/07/2020    CALCIUM 9.5 08/07/2020    PROT 7.0 08/07/2020    LABALBU 4.8 08/07/2020    BILITOT 0.5 08/07/2020    ALKPHOS 90 08/07/2020    AST 37 08/07/2020    ALT 28 08/07/2020    LABGLOM >60 08/07/2020    GFRAA >60 08/07/2020     Lab Results   Component Value Date     04/30/2011     No components found for: LD  Lab Results   Component Value Date    TSHHS 1.810 11/06/2019     Immunology:  Lab Results   Component Value Date    PROT 7.0 08/07/2020     Anemia Panel:  Lab Results   Component Value Date    ZCDCXNTF27 352.4 08/07/2020    FOLATE 12.0 01/21/2020     Tumor Markers:  Lab Results   Component Value Date    PSA 0.01 08/07/2020      Observations:  PHQ-9 Total Score: 0 (9/2/2020 11:07 AM)     Assessment & Plan:    1. He has treatment naïve metastatic prostate cancer with involvement of the bone, baldder and pelvic lymph node. We went over NCCN guideline. Due to the lack of visceral disease, I offer with Lupron and Zytiga plus prednisone. He understand the palliative goal of the therapy. I plan to have follow-up imaging study and PSA in 3 months. Urologist started him on Eligard. He is due for eligard in January 2020. He started zytiga and prednisone in July 2019. PSA in August 2019 was 0.9. PSA in October 2019 was 0.2. Clinically he responded. CT abdomen and pelvis and bone scan in October 2019 showed stable findings. CT chest in January 2020 showed 0.7 cm cavitary lesion. PSA in January 2020 was 0.08. PSA in April 2020 was 0.08. Labs in August 2020 were reviewed. He will continue with Lupron plus Zytiga and prednisone. I plan to have follow-up blood test with the next office visit.   Bone scan, CT abdomen pelvis would be optional for now. 2.  CT chest in July 2019 showed nonspecific 0.8 cm RUL nodule. F/u in 6 months was recommended. He will have follow-up CT chest in August 2021. 3. He has Bronson's muscular dystrophy. I refer him to see Genetic counselor-   Anna Burt has discussed with him. 4. We discussed about healthy diet and lifestyle. 5. B-12 deficiency. He will continue with B-12 injection. Spouse has given B-12 injection. Return to clinic in 3 months or sooner. All of his question has been answered for today. Recent imaging and labs were reviewed and discussed with the patient.       Electronically signed by Myra Wilson MD on 8/28/20 at 9:06 AM NOHEMI

## 2020-09-02 ENCOUNTER — HOSPITAL ENCOUNTER (OUTPATIENT)
Dept: INFUSION THERAPY | Age: 55
Discharge: HOME | End: 2020-09-02
Payer: MEDICARE

## 2020-09-02 ENCOUNTER — OFFICE VISIT (OUTPATIENT)
Dept: ONCOLOGY | Age: 55
End: 2020-09-02
Payer: MEDICARE

## 2020-09-02 VITALS
HEART RATE: 86 BPM | BODY MASS INDEX: 24.29 KG/M2 | RESPIRATION RATE: 16 BRPM | WEIGHT: 164 LBS | OXYGEN SATURATION: 97 % | DIASTOLIC BLOOD PRESSURE: 85 MMHG | TEMPERATURE: 97.3 F | HEIGHT: 69 IN | SYSTOLIC BLOOD PRESSURE: 145 MMHG

## 2020-09-02 PROCEDURE — 99211 OFF/OP EST MAY X REQ PHY/QHP: CPT

## 2020-09-02 PROCEDURE — G8420 CALC BMI NORM PARAMETERS: HCPCS | Performed by: INTERNAL MEDICINE

## 2020-09-02 PROCEDURE — 3017F COLORECTAL CA SCREEN DOC REV: CPT | Performed by: INTERNAL MEDICINE

## 2020-09-02 PROCEDURE — 99213 OFFICE O/P EST LOW 20 MIN: CPT | Performed by: INTERNAL MEDICINE

## 2020-09-02 PROCEDURE — G8427 DOCREV CUR MEDS BY ELIG CLIN: HCPCS | Performed by: INTERNAL MEDICINE

## 2020-09-02 PROCEDURE — 1036F TOBACCO NON-USER: CPT | Performed by: INTERNAL MEDICINE

## 2020-09-02 ASSESSMENT — PATIENT HEALTH QUESTIONNAIRE - PHQ9
SUM OF ALL RESPONSES TO PHQ QUESTIONS 1-9: 0
2. FEELING DOWN, DEPRESSED OR HOPELESS: 0
1. LITTLE INTEREST OR PLEASURE IN DOING THINGS: 0
SUM OF ALL RESPONSES TO PHQ QUESTIONS 1-9: 0
SUM OF ALL RESPONSES TO PHQ9 QUESTIONS 1 & 2: 0

## 2020-09-02 NOTE — PROGRESS NOTES
MA Rooming Questions  Patient: Zuly Ayoub  MRN: [id number]    Date: 9/2/2020        1. Do you have any new issues?   no         2. Do you need any refills on medications?    no    3. Have you had any imaging done since your last visit?   no    4. Have you been hospitalized or seen in the emergency room since your last visit here?   no    5. Did the patient have a depression screening completed today?  Yes    PHQ-9 Total Score: 0 (9/2/2020 11:07 AM)       PHQ-9 Given to (if applicable):               PHQ-9 Score (if applicable):                     [] Positive     []  Negative              Does question #9 need addressed (if applicable)                     [] Yes    []  No               Rhea Salazar MA

## 2020-09-08 ENCOUNTER — HOSPITAL ENCOUNTER (OUTPATIENT)
Dept: INFUSION THERAPY | Age: 55
Discharge: HOME | End: 2020-09-08
Payer: MEDICARE

## 2020-09-08 DIAGNOSIS — C61 MALIGNANT NEOPLASM OF PROSTATE (HCC): Primary | ICD-10-CM

## 2020-09-08 LAB
ALBUMIN SERPL-MCNC: 4.6 GM/DL (ref 3.4–5)
ALP BLD-CCNC: 88 IU/L (ref 40–128)
ALT SERPL-CCNC: 30 U/L (ref 10–40)
ANION GAP SERPL CALCULATED.3IONS-SCNC: 14 MMOL/L (ref 4–16)
AST SERPL-CCNC: 27 IU/L (ref 15–37)
BASOPHILS ABSOLUTE: 0 K/CU MM
BASOPHILS RELATIVE PERCENT: 0.3 % (ref 0–1)
BILIRUB SERPL-MCNC: 0.6 MG/DL (ref 0–1)
BUN BLDV-MCNC: 9 MG/DL (ref 6–23)
CALCIUM SERPL-MCNC: 9.5 MG/DL (ref 8.3–10.6)
CHLORIDE BLD-SCNC: 101 MMOL/L (ref 99–110)
CO2: 24 MMOL/L (ref 21–32)
CREAT SERPL-MCNC: 0.3 MG/DL (ref 0.9–1.3)
DIFFERENTIAL TYPE: ABNORMAL
EOSINOPHILS ABSOLUTE: 0.3 K/CU MM
EOSINOPHILS RELATIVE PERCENT: 4.4 % (ref 0–3)
GFR AFRICAN AMERICAN: >60 ML/MIN/1.73M2
GFR NON-AFRICAN AMERICAN: >60 ML/MIN/1.73M2
GLUCOSE BLD-MCNC: 102 MG/DL (ref 70–99)
HCT VFR BLD CALC: 38.3 % (ref 42–52)
HEMOGLOBIN: 12.9 GM/DL (ref 13.5–18)
LYMPHOCYTES ABSOLUTE: 1.8 K/CU MM
LYMPHOCYTES RELATIVE PERCENT: 26.7 % (ref 24–44)
MCH RBC QN AUTO: 31 PG (ref 27–31)
MCHC RBC AUTO-ENTMCNC: 33.7 % (ref 32–36)
MCV RBC AUTO: 92.1 FL (ref 78–100)
MONOCYTES ABSOLUTE: 0.5 K/CU MM
MONOCYTES RELATIVE PERCENT: 6.9 % (ref 0–4)
PDW BLD-RTO: 13.6 % (ref 11.7–14.9)
PLATELET # BLD: 175 K/CU MM (ref 140–440)
PMV BLD AUTO: 11.5 FL (ref 7.5–11.1)
POTASSIUM SERPL-SCNC: 4.2 MMOL/L (ref 3.5–5.1)
RBC # BLD: 4.16 M/CU MM (ref 4.6–6.2)
SEGMENTED NEUTROPHILS ABSOLUTE COUNT: 4.2 K/CU MM
SEGMENTED NEUTROPHILS RELATIVE PERCENT: 61.7 % (ref 36–66)
SODIUM BLD-SCNC: 139 MMOL/L (ref 135–145)
TOTAL PROTEIN: 6.7 GM/DL (ref 6.4–8.2)
WBC # BLD: 6.8 K/CU MM (ref 4–10.5)

## 2020-09-08 PROCEDURE — 80053 COMPREHEN METABOLIC PANEL: CPT

## 2020-09-08 PROCEDURE — 85025 COMPLETE CBC W/AUTO DIFF WBC: CPT

## 2020-09-08 PROCEDURE — 84154 ASSAY OF PSA FREE: CPT

## 2020-09-08 PROCEDURE — 84153 ASSAY OF PSA TOTAL: CPT

## 2020-09-08 PROCEDURE — 36415 COLL VENOUS BLD VENIPUNCTURE: CPT

## 2020-09-08 PROCEDURE — 96402 CHEMO HORMON ANTINEOPL SQ/IM: CPT

## 2020-09-08 PROCEDURE — 6360000002 HC RX W HCPCS: Performed by: INTERNAL MEDICINE

## 2020-09-08 RX ADMIN — LEUPROLIDE ACETATE 22.5 MG: KIT at 14:49

## 2020-09-08 NOTE — PROGRESS NOTES
Here for Lupron injection. Voiced he's been getting hives from prednisone, benefits out weight risk, taking Claritin and prevacid for histamine with treatment. Denies needs at this time. Injection administered as ordered. Tolerated well. Appt card given. Discharged in stable condition.

## 2020-09-09 LAB
PROSTATE SPECIFIC ANTIGEN FREE: <0.1 NG/ML
PROSTATE SPECIFIC ANTIGEN PERCENT FREE: NORMAL %
PROSTATE SPECIFIC ANTIGEN: <0.1 NG/ML (ref 0–4)

## 2020-09-18 RX ORDER — ABIRATERONE ACETATE 250 MG/1
TABLET ORAL
Qty: 120 TABLET | Refills: 0 | Status: CANCELLED | OUTPATIENT
Start: 2020-09-18

## 2020-09-18 NOTE — TELEPHONE ENCOUNTER
Patient called to have a refill on Zytiga. Please send to '[facility] pharmacy.     Thank you,    Srinivas Nathan

## 2020-09-22 RX ORDER — ABIRATERONE ACETATE 250 MG/1
1000 TABLET ORAL DAILY
Qty: 120 TABLET | Refills: 3 | Status: SHIPPED | OUTPATIENT
Start: 2020-09-22 | End: 2021-01-04 | Stop reason: SDUPTHER

## 2020-09-24 ENCOUNTER — OFFICE VISIT (OUTPATIENT)
Dept: FAMILY MEDICINE CLINIC | Age: 55
End: 2020-09-24
Payer: MEDICARE

## 2020-09-24 VITALS — HEART RATE: 80 BPM | TEMPERATURE: 98.1 F | DIASTOLIC BLOOD PRESSURE: 82 MMHG | SYSTOLIC BLOOD PRESSURE: 124 MMHG

## 2020-09-24 PROCEDURE — G0008 ADMIN INFLUENZA VIRUS VAC: HCPCS | Performed by: FAMILY MEDICINE

## 2020-09-24 PROCEDURE — 99214 OFFICE O/P EST MOD 30 MIN: CPT | Performed by: FAMILY MEDICINE

## 2020-09-24 PROCEDURE — 90686 IIV4 VACC NO PRSV 0.5 ML IM: CPT | Performed by: FAMILY MEDICINE

## 2020-09-24 NOTE — PROGRESS NOTES
Vaccine Information Sheet, \"Influenza - Inactivated\"  given to Carley Melton, or parent/legal guardian of  Carley Melton and verbalized understanding. Patient responses:    Have you ever had a reaction to a flu vaccine? No  Do you have any current illness? No  Have you ever had [place] Syndrome? No  Do you have a serious allergy to any of the follow: Neomycin, Polymyxin, Thimerosal, eggs or egg products? No    Flu vaccine given per order. Please see immunization tab. Risks and benefits explained. Current VIS given.

## 2020-09-24 NOTE — PROGRESS NOTES
9/24/2020    Bran Thompson    Chief Complaint   Patient presents with   Peter Bro Other     rov    Urticaria     d/t prednisone. pt currently taking famotidine & loratidine - helps slightly       HPI    Jeanne Block is a 63 y.o. male who presents today with follow-up. Concerning patient's muscular dystrophy. Patient feels that he is holding his own. It appears he is have a little more muscle wasting. His blood pressure is good today. Patient denies shortness of breath. I offered patient inhaler. He does not wish to have any is he feels he does not need him. This was in reference to patient's diagnosis of interstitial pulmonary disease unspecified. Patient wished to have his PSA numbers reviewed. We went over the last year and a half which showed a tremendous decrease. Is currently at less than 0.01. Patient is very pleased with therapy. Patient is very disappointed to have urticaria appearing. He is only on chemotherapy and prednisone. His oncologist feels is due to the prednisone. He cannot take 1 without the other. Either 10 or 20 once a day and Claritin 10 daily. I asked him to double both of his current medications.     REVIEW OF SYSTEMS    Constitutional:  Denies fever, chills, weight loss or weakness  Eyes:  no photophobia or discharge  ENT:  no sore throat or ear pain  Cardiovascular:  Denies chest pain, palpitations or swelling  Respiratory:  Denies cough or shortness of breath  GI:  no abdominal pain, nausea, vomiting, or diarrhea  Musculoskeletal:  no back pain  Skin:  No rashes  Neurologic:  no headache, focal weakness, or sensory changes  Endocrine:  no polyuria or polydipsia      PAST MEDICAL HISTORY  Past Medical History:   Diagnosis Date    Mixed hyperlipidemia 10/24/2019       FAMILY HISTORY  Family History   Problem Relation Age of Onset    Hypertension Mother     Colon Cancer Father     Cancer Father     Hypertension Sister     Hypertension Maternal Grandmother        SOCIAL Temp 98.1 °F (36.7 °C)     Constitutional:  Well developed, well nourished  HEENT:  Normocephalic, atraumatic, bilateral external ears normal, oropharynx moist, nose normal  Neck:  Normal range of motion, no tenderness, supple  Lymphatic:  No lymphadenopathy noted  Cardiovascular:  Normal heart rate, S1S2 nl  Thorax & Lungs:  Normal breath sounds, no respiratory distress, no wheezing  Skin: Patient has some urticarial looking lesions. Back:  straight  Extremities:  No edema, no tenderness, no cyanosis  Musculoskeletal:  Good range of motion   Neurologic:  Alert & oriented X 3      ASSESSMENT & PLAN    1. Muscular dystrophy ([address]  Continually progressive. Patient with a neurologist.  Muriel John a long time discussing avoidance of COVID. Patient has good understanding. 2. Interstitial pulmonary disease, unspecified ([address]  Patient declines further medicine and evaluation at this time. I do not think that is a wise choice. We will continue to encourage. 3. Prostate cancer metastatic to bone [facility])  Great improvement. Continue the same. 4. Needs flu shot  - Influenza, Quadv, 3 yrs and older, IM, PF, Prefill Syr or SDV, 0.5mL (AFLURIA QUADV, PF)    5. Acute urticaria  Double his Pepcid and Claritin to twice daily. Unclear what we add on from there.   Could consider an allergist.           Electronically signed by Talha Enriquez MD on 9/24/2020

## 2020-12-02 ENCOUNTER — HOSPITAL ENCOUNTER (OUTPATIENT)
Dept: INFUSION THERAPY | Age: 55
Discharge: HOME | End: 2020-12-02
Payer: MEDICARE

## 2020-12-02 ENCOUNTER — OFFICE VISIT (OUTPATIENT)
Dept: ONCOLOGY | Age: 55
End: 2020-12-02
Payer: MEDICARE

## 2020-12-02 VITALS
HEART RATE: 86 BPM | HEIGHT: 69 IN | SYSTOLIC BLOOD PRESSURE: 136 MMHG | RESPIRATION RATE: 18 BRPM | WEIGHT: 165 LBS | BODY MASS INDEX: 24.44 KG/M2 | DIASTOLIC BLOOD PRESSURE: 78 MMHG | OXYGEN SATURATION: 98 %

## 2020-12-02 DIAGNOSIS — C61 MALIGNANT NEOPLASM OF PROSTATE (HCC): ICD-10-CM

## 2020-12-02 DIAGNOSIS — R53.83 FATIGUE, UNSPECIFIED TYPE: ICD-10-CM

## 2020-12-02 LAB
ALBUMIN SERPL-MCNC: 4.4 GM/DL (ref 3.4–5)
ALP BLD-CCNC: 73 IU/L (ref 40–129)
ALT SERPL-CCNC: 29 U/L (ref 10–40)
ANION GAP SERPL CALCULATED.3IONS-SCNC: 14 MMOL/L (ref 4–16)
AST SERPL-CCNC: 25 IU/L (ref 15–37)
BASOPHILS ABSOLUTE: 0 K/CU MM
BASOPHILS RELATIVE PERCENT: 0.3 % (ref 0–1)
BILIRUB SERPL-MCNC: 0.5 MG/DL (ref 0–1)
BUN BLDV-MCNC: 7 MG/DL (ref 6–23)
CALCIUM SERPL-MCNC: 9.1 MG/DL (ref 8.3–10.6)
CHLORIDE BLD-SCNC: 102 MMOL/L (ref 99–110)
CO2: 23 MMOL/L (ref 21–32)
CREAT SERPL-MCNC: 0.3 MG/DL (ref 0.9–1.3)
DIFFERENTIAL TYPE: ABNORMAL
EOSINOPHILS ABSOLUTE: 0.2 K/CU MM
EOSINOPHILS RELATIVE PERCENT: 3.7 % (ref 0–3)
FOLATE: 18.1 NG/ML (ref 3.1–17.5)
GFR AFRICAN AMERICAN: >60 ML/MIN/1.73M2
GFR NON-AFRICAN AMERICAN: >60 ML/MIN/1.73M2
GLUCOSE BLD-MCNC: 102 MG/DL (ref 70–99)
HCT VFR BLD CALC: 40.7 % (ref 42–52)
HEMOGLOBIN: 13.5 GM/DL (ref 13.5–18)
LYMPHOCYTES ABSOLUTE: 1.2 K/CU MM
LYMPHOCYTES RELATIVE PERCENT: 19.5 % (ref 24–44)
MCH RBC QN AUTO: 30.4 PG (ref 27–31)
MCHC RBC AUTO-ENTMCNC: 33.2 % (ref 32–36)
MCV RBC AUTO: 91.7 FL (ref 78–100)
MONOCYTES ABSOLUTE: 0.3 K/CU MM
MONOCYTES RELATIVE PERCENT: 5 % (ref 0–4)
PDW BLD-RTO: 13.4 % (ref 11.7–14.9)
PLATELET # BLD: 148 K/CU MM (ref 140–440)
PMV BLD AUTO: 11.5 FL (ref 7.5–11.1)
POTASSIUM SERPL-SCNC: 4.1 MMOL/L (ref 3.5–5.1)
RBC # BLD: 4.44 M/CU MM (ref 4.6–6.2)
SEGMENTED NEUTROPHILS ABSOLUTE COUNT: 4.4 K/CU MM
SEGMENTED NEUTROPHILS RELATIVE PERCENT: 71.5 % (ref 36–66)
SODIUM BLD-SCNC: 139 MMOL/L (ref 135–145)
TOTAL PROTEIN: 6.8 GM/DL (ref 6.4–8.2)
VITAMIN B-12: 443 PG/ML (ref 211–911)
WBC # BLD: 6.2 K/CU MM (ref 4–10.5)

## 2020-12-02 PROCEDURE — 82607 VITAMIN B-12: CPT

## 2020-12-02 PROCEDURE — 99211 OFF/OP EST MAY X REQ PHY/QHP: CPT

## 2020-12-02 PROCEDURE — 36415 COLL VENOUS BLD VENIPUNCTURE: CPT

## 2020-12-02 PROCEDURE — 1036F TOBACCO NON-USER: CPT | Performed by: NURSE PRACTITIONER

## 2020-12-02 PROCEDURE — G8420 CALC BMI NORM PARAMETERS: HCPCS | Performed by: NURSE PRACTITIONER

## 2020-12-02 PROCEDURE — G8427 DOCREV CUR MEDS BY ELIG CLIN: HCPCS | Performed by: NURSE PRACTITIONER

## 2020-12-02 PROCEDURE — 99213 OFFICE O/P EST LOW 20 MIN: CPT | Performed by: NURSE PRACTITIONER

## 2020-12-02 PROCEDURE — 82746 ASSAY OF FOLIC ACID SERUM: CPT

## 2020-12-02 PROCEDURE — 84153 ASSAY OF PSA TOTAL: CPT

## 2020-12-02 PROCEDURE — 84154 ASSAY OF PSA FREE: CPT

## 2020-12-02 PROCEDURE — 85025 COMPLETE CBC W/AUTO DIFF WBC: CPT

## 2020-12-02 PROCEDURE — 80053 COMPREHEN METABOLIC PANEL: CPT

## 2020-12-02 PROCEDURE — 3017F COLORECTAL CA SCREEN DOC REV: CPT | Performed by: NURSE PRACTITIONER

## 2020-12-02 PROCEDURE — G8482 FLU IMMUNIZE ORDER/ADMIN: HCPCS | Performed by: NURSE PRACTITIONER

## 2020-12-02 RX ORDER — LORATADINE 10 MG/1
10 CAPSULE, LIQUID FILLED ORAL DAILY
COMMUNITY
End: 2022-05-03

## 2020-12-02 RX ORDER — CYANOCOBALAMIN 1000 UG/ML
INJECTION INTRAMUSCULAR; SUBCUTANEOUS
COMMUNITY
Start: 2020-11-22 | End: 2021-07-23

## 2020-12-02 RX ORDER — FAMOTIDINE 20 MG/1
20 TABLET, FILM COATED ORAL 2 TIMES DAILY
COMMUNITY
End: 2022-05-03

## 2020-12-02 NOTE — PROGRESS NOTES
Patient Name: Abdiel Sarkar  Patient : 1965  Patient MRN: [id number]     Primary Oncologist: Angi Ron MD  Referring Provider: Matteo Mendoza MD     Date of Service: 2020      Chief Complaint:   Chief Complaint   Patient presents with    Follow-up     He came in for follow-up visit. Patient Active Problem List:     Muscular dystrophy [facility])     Prostate cancer metastatic to bone ([facility] [address]     Anemia, unspecified     Malignant neoplasm of prostate ([facility] [address]     Family history of malignant neoplasm of breast     Encounter for nonprocreative genetic counseling     Interstitial pulmonary disease, unspecified ([facility] [address]     HPI:   Jess Cruz is a pleasant 53-year-old  male patient was referred for this of the diagnosis of prostate cancer involving the bone, bladder and pelvic lymph node. Initially he presented with gross hematuria in 2019. He went to see family doctor and had an ultrasound of the bladder. 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 Invasive prostatic adenocarcinoma considered less likely. Opal Money PSA in 2019 was 29.51.  CT abdomen and pelvis on 2019 showed 3.7 cm intraluminal bladder mass likely representing transitional cell carcinoma rather than extension of the prostate. Chest x-ray showed no acute infiltrative process. On May 14, 2019 he underwent cystoscopy with transurethral resection of the bladder tumor, 2 cm in size and transrectal ultrasound-guided prostate needle biopsy. Pathology report of left prostate biopsy showed adenocarcinoma Windsor score 4+4. Number cores positive out of 12. Proportion of prostatic tissue involved by tumor was 17%. Perineural invasion was seen. Pathology report of urinary bladder biopsy showed prostatic adenocarcinoma.   MRI of pelvis on 2019 showed large mass left peripheral zone emphysematous changes with no other acute cardiopulmonary findings. PSA in April 2020 was 0.04. Sister has given B-12 injection. CBC is stable. B-12 improved. He is due for lupron injection in July 2020 and to see Dr Brett Grijalva. On September 2, 2020 he came in for follow-up visit. CT chest 8/7/2020:    Stable 7 mm pulmonary nodule seen within the right upper lobe recommend    follow-up CT scan in 1 year's time. PSA 0.01 in August 2020. Bone scan and CT abdomen would be optional for now unless his PSA starts rising or he has symptoms suggesting of progression of the disease. Jaja Jenkins He has nonspecific rest which he attributes to the prednisone. He takes only 5 mg a day. Weight has been fluctuating but stable. He takes Claritin. He is due for Lupron injection in September 2020. Spouse has given B12 injection at home. Presented for scheduled follow up on 12/2/2020. He reports he has ongoing hives with prednisone of which he is taking 5 mg daily. He uses pepcid bid and claritin. He has occasional hot flushes. He will have labs today including PSA. He is requesting b12 level as well. He has no new symptoms concerning for progression of disease. He denies any acute pain. No nausea, vomiting or diarrhea. No dysuria or hematuria. No cough or headache. Due to muscular dystrophy here he has been using wheelchair. Past Medical History  Prostate cancer Biola score 8, Bronson's muscular dystrophy. Surgical History  Colonoscopy and extirpation of lesion of colon     Allergies  bupropion     Medications  Reviewed as per chart. Social History  He smoked 1 to 2 pack/day for more than 20 years and quit on August 22, 2012. He denies any alcohol or illicit drug use. He has twin sons. Family History  Father: Colorectal cancer  Mother: Breast cancer  Grandfather - Maternal (2nd Degree): Lung cancer  Grandmother - Maternal (2nd Degree): Lung cancer.      Current Therapy  Abiraterone + Prednisone + Leuprolide Q3M 2 (L) 01/21/2020    SEGSPCT 61.7 09/08/2020    EOSRELPCT 4.4 (H) 09/08/2020    BASOPCT 0.3 09/08/2020    LYMPHOPCT 26.7 09/08/2020    MONOPCT 6.9 (H) 09/08/2020    BANDABS 0.10 01/21/2020    SEGSABS 4.2 09/08/2020    EOSABS 0.3 09/08/2020    BASOSABS 0.0 09/08/2020    LYMPHSABS 1.8 09/08/2020    MONOSABS 0.5 09/08/2020    DIFFTYPE AUTOMATED DIFFERENTIAL 09/08/2020    ANISOCYTOSIS 1+ 10/30/2019    POLYCHROM 1+ 01/21/2020    WBCMORP OCCASIONAL  ATYPICAL LYMPH(S) NOTED   01/21/2020    PLTM FEW 04/27/2020     Chemistry:  Lab Results   Component Value Date     09/08/2020    K 4.2 09/08/2020     09/08/2020    CO2 24 09/08/2020    BUN 9 09/08/2020    CREATININE 0.3 (L) 09/08/2020    GLUCOSE 102 (H) 09/08/2020    CALCIUM 9.5 09/08/2020    PROT 6.7 09/08/2020    LABALBU 4.6 09/08/2020    BILITOT 0.6 09/08/2020    ALKPHOS 88 09/08/2020    AST 27 09/08/2020    ALT 30 09/08/2020    LABGLOM >60 09/08/2020    GFRAA >60 09/08/2020     Lab Results   Component Value Date     04/30/2011     No components found for: LD  Lab Results   Component Value Date    TSHHS 1.810 11/06/2019     Immunology:  Lab Results   Component Value Date    PROT 6.7 09/08/2020     Anemia Panel:  Lab Results   Component Value Date    YXOKHYSY31 352.4 08/07/2020    FOLATE 12.0 01/21/2020     Tumor Markers:  Lab Results   Component Value Date    PSA <0.1 09/08/2020      Observations:  No data recorded     Assessment & Plan:    1. He has treatment naïve metastatic prostate cancer with involvement of the bone, baldder and pelvic lymph node. We went over NCCN guideline. Due to the lack of visceral disease, I offer with Lupron and Zytiga plus prednisone. He understand the palliative goal of the therapy. I plan to have follow-up imaging study and PSA in 3 months. Urologist started him on Eligard. He is due for eligard in January 2020. He started zytiga and prednisone in July 2019. PSA in August 2019 was 0.9. PSA in October 2019 was 0.2. Clinically he responded. CT abdomen and pelvis and bone scan in October 2019 showed stable findings. CT chest in January 2020 showed 0.7 cm cavitary lesion. PSA in January 2020 was 0.08. PSA in April 2020 was 0.08. Labs in August 2020 were reviewed. He will continue with Lupron plus Zytiga and prednisone. I plan to have follow-up blood test with the next office visit. Bone scan, CT abdomen pelvis would be optional for now. Compliant with medications. Due for Lupron next week. He will have labs today including PSA. 2.  CT chest in July 2019 showed nonspecific 0.8 cm RUL nodule. F/u in 6 months was recommended. He will have follow-up CT chest in August 2021. 3. He has Bronson's muscular dystrophy. I refer him to see Genetic counselor- No pathogenic mutations noted. Elzbieta Flowers has discussed with him. 4. We discussed about healthy diet and lifestyle. 5. B-12 deficiency. He will continue with B-12 injection. Spouse has given B-12 injection. He requested to recheck b12 level today. Return to clinic in 3 months or sooner. All of his question has been answered for today. Recent imaging and labs were reviewed and discussed with the patient.

## 2020-12-07 ENCOUNTER — TELEPHONE (OUTPATIENT)
Dept: ONCOLOGY | Age: 55
End: 2020-12-07

## 2020-12-08 ENCOUNTER — HOSPITAL ENCOUNTER (OUTPATIENT)
Dept: INFUSION THERAPY | Age: 55
Discharge: HOME | End: 2020-12-08
Payer: MEDICARE

## 2020-12-08 DIAGNOSIS — C61 MALIGNANT NEOPLASM OF PROSTATE (HCC): Primary | ICD-10-CM

## 2020-12-08 PROCEDURE — 96372 THER/PROPH/DIAG INJ SC/IM: CPT

## 2020-12-08 PROCEDURE — 6360000002 HC RX W HCPCS: Performed by: INTERNAL MEDICINE

## 2020-12-08 PROCEDURE — 96401 CHEMO ANTI-NEOPL SQ/IM: CPT

## 2020-12-08 RX ADMIN — LEUPROLIDE ACETATE 22.5 MG: KIT SUBCUTANEOUS at 00:00

## 2020-12-08 NOTE — PROGRESS NOTES
Assisted to infusion area via wheelchair. No complaints voiced. Eligard injection administered as ordered. Tolerated well.

## 2021-01-04 DIAGNOSIS — C61 MALIGNANT NEOPLASM OF PROSTATE (HCC): Primary | ICD-10-CM

## 2021-01-04 RX ORDER — ABIRATERONE ACETATE 250 MG/1
1000 TABLET ORAL DAILY
Qty: 120 TABLET | Refills: 3 | Status: SHIPPED | OUTPATIENT
Start: 2021-01-04 | End: 2021-04-20 | Stop reason: SDUPTHER

## 2021-02-22 NOTE — PROGRESS NOTES
Patient Name: Anna Desai  Patient : 1965  Patient MRN: [id number]     Primary Oncologist: Everett Frederick MD  Referring Provider: Carol Lynch MD     Date of Service: 3/9/2021      Chief Complaint:   Chief Complaint   Patient presents with    Follow-up     He came in for follow-up visit. Patient Active Problem List:     Muscular dystrophy ([address]     Prostate cancer metastatic to bone ([address]     Anemia, unspecified     Malignant neoplasm of prostate ([address]     Family history of malignant neoplasm of breast     Encounter for nonprocreative genetic counseling     Interstitial pulmonary disease, unspecified ([address]     HPI:   Nery Sebastian is a pleasant 50-year-old  male patient was referred for this of the diagnosis of prostate cancer involving the bone, bladder and pelvic lymph node. Initially he presented with gross hematuria in 2019. He went to see family doctor and had an ultrasound of the bladder. 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 Invasive prostatic adenocarcinoma considered less likely. Teresita  PSA in 2019 was 29.51.  CT abdomen and pelvis on 2019 showed 3.7 cm intraluminal bladder mass likely representing transitional cell carcinoma rather than extension of the prostate. Chest x-ray showed no acute infiltrative process. On May 14, 2019 he underwent cystoscopy with transurethral resection of the bladder tumor, 2 cm in size and transrectal ultrasound-guided prostate needle biopsy. Pathology report of left prostate biopsy showed adenocarcinoma Gardners score 4+4. Number cores positive out of 12. Proportion of prostatic tissue involved by tumor was 17%. Perineural invasion was seen. Pathology report of urinary bladder biopsy showed prostatic adenocarcinoma.   MRI of pelvis on 2019 showed large mass left peripheral zone extraprostatic extension including left seminal vesicle involvement and left neurovascular bundle involvement,  Multiple  enlarged lymph nodes, left pelvic side lymph node 1.0 x 2.0 cm, left pelvic sidewall lymph node 1.1 x 1.1 cm, right pelvic lymph node 1.5 cm. Bone scan on June 6, 2019 showed skeletal metastatic disease in the lower lumbar spine, sacrum and along the left sacroiliac joint. At present time he denies any specific bone pain. We went over NCCN guideline. Due to the lack of visceral disease and high tumor burden, I offered androgen deprivation therapy. He refused to consider surgical castration. I started him on Casodex prior to androgen deprivation therapy. I scheduled for chemo class. We discussed the potential role of radium therapy if he has more symptoms related to the bone cancer. We discussed the role of provenge/vaccination. He understands well about his prognosis and the goal of the therapy which is palliative. He is agreeable to consider genetic counseling. BMP in April 2019 was unremarkable. He received Eligard injection by urologist.  He finished casodex and started zytiga 7/2019. I reminded him to take prednisone along with zytiga. CT chest in July 2019 showed nonspecific 0.8 cm semisolid noncalcified nodule int RUL. Follow up in 6 months was recommended. So far he tolerated zytiga and prednisone. PSA in August 2019 was 0.9. Bone scan and CT abdomen pelvis in October 2019 showed stable metastatic disease to the bone. PSA was 0.2. Labs in January 2020 were reviewed. PSA was 0.08. CT chest in January 2020 showed 0.7 cm cavitary lesion. He was found to have B-12 deficiency. He will continue B-12 injection. He had lupron injection by urologist in January 2020. CT chest on April 27. 2020 :  1. Stable 7 mm partially cavitary nodule within the right apex. Recommend follow-up CT in 3 months or per clinical protocol.   2. Mild emphysematous changes with no other acute cardiopulmonary findings. PSA in April 2020 was 0.04. Sister has given B-12 injection. CBC is stable. B-12 improved. CT chest 8/7/2020:    Stable 7 mm pulmonary nodule seen within the right upper lobe recommend    follow-up CT scan in 1 year's time. PSA 0.01 in August 2020. Bone scan and CT abdomen would be optional for now unless his PSA starts rising or he has symptoms suggesting of progression of the disease. Dick Cid He has nonspecific rest which he attributes to the prednisone. He takes only 5 mg a day. Weight has been fluctuating but stable. He takes Claritin. He had Lupron injection in September 2020. Spouse has given B12 injection at home. He reports he has ongoing hives with prednisone of which he is taking 5 mg daily. He uses pepcid bid and claritin. He has occasional hot flushes. On March 9, 2021 he came in for follow up visit. PSA in 12/2/2020 was <0.1. Vitamin B-12 was 443, folate 18.1. CMP was unremarkable. CBC was stable. He will lupron today. He will have labs today including PSA and B-12 level as well. I advised to call for result. I gave refill for monthly B-12 injection. He has no new symptoms concerning for progression of disease. He denies any acute pain. No nausea, vomiting or diarrhea. No dysuria or hematuria. No cough or headache. Due to muscular dystrophy here he has been using wheelchair. Past Medical History  Prostate cancer Stacey score 8, Bronson's muscular dystrophy. Surgical History  Colonoscopy and extirpation of lesion of colon     Social History  He smoked 1 to 2 pack/day for more than 20 years and quit on August 22, 2012. He denies any alcohol or illicit drug use. He has twin sons. Family History  Father: Colorectal cancer  Mother: Breast cancer  Grandfather - Maternal (2nd Degree): Lung cancer  Grandmother - Maternal (2nd Degree): Lung cancer.      Current Therapy  Abiraterone + Prednisone + Leuprolide Q3M Cycle Length: 84 Number Cycles: 8 Start: Miguel Angel Horn on 07/17/2019 Assoc Dx: Prostate cancer LOT: Primary Treatment Stage: JOSE Treatment Intent: [id number]/30/2020 C1 D1  Leuprolide IM (3 month), 22.5 mg  Prednisone Oral, 5 mg  Abiraterone Oral, 1000 mg qday    Review of Systems: \"Per interval history; otherwise 10 point ROS is negative. \"    Vital Signs:  /74 (Site: Right Upper Arm, Position: Sitting, Cuff Size: Medium Adult)   Pulse 92   Temp 98.1 °F (36.7 °C) (Temporal)   Resp 16   SpO2 100%     Physical Exam:  CONSTITUTIONAL: awake, alert, cooperative, no apparent distress   EYES: sclera clear and conjunctiva normal  ENT: Normocephalic, without obvious abnormality, atraumatic  NECK: supple, symmetrical  HEMATOLOGIC/LYMPHATIC: no cervical, supraclavicular or axillary lymphadenopathy   LUNGS: no increased work of breathing and clear to auscultation   CARDIOVASCULAR: regular rate and rhythm, normal S1 and S2, no murmur noted  ABDOMEN: normal bowel sounds x 4, soft, non-distended, non-tender, no masses palpated, no hepatosplenomegaly   MUSCULOSKELETAL: full range of motion noted, tone is normal  NEUROLOGIC: awake, alert, oriented to name, place and time. Decreased power due to muscular dystrophy. Cranial nerves II through XII grossly intact. SKIN: Normal skin color, texture, turgor and no jaundice. appears intact  hives to left hand. EXTREMITIES: no LE edema. No cyanosis.      Labs:  Hematology:  Lab Results   Component Value Date    WBC 5.1 03/09/2021    RBC 4.09 (L) 03/09/2021    HGB 12.4 (L) 03/09/2021    HCT 37.2 (L) 03/09/2021    MCV 91.0 03/09/2021    MCH 30.3 03/09/2021    MCHC 33.3 03/09/2021    RDW 13.6 03/09/2021     (L) 03/09/2021    MPV 12.3 (H) 03/09/2021    BANDSPCT 2 (L) 01/21/2020    SEGSPCT 64.4 03/09/2021    EOSRELPCT 4.1 (H) 03/09/2021    BASOPCT 0.2 03/09/2021    LYMPHOPCT 25.0 03/09/2021    MONOPCT 6.3 (H) 03/09/2021    BANDABS 0.10 01/21/2020    SEGSABS 3.3 03/09/2021    EOSABS 0.2 03/09/2021    BASOSABS 0.0 03/09/2021    LYMPHSABS 1.3 03/09/2021    MONOSABS 0.3 03/09/2021    DIFFTYPE AUTOMATED DIFFERENTIAL 03/09/2021    ANISOCYTOSIS 1+ 10/30/2019    POLYCHROM 1+ 01/21/2020    WBCMORP OCCASIONAL  ATYPICAL LYMPH(S) NOTED   01/21/2020    PLTM FEW 04/27/2020     Chemistry:  Lab Results   Component Value Date     12/02/2020    K 4.1 12/02/2020     12/02/2020    CO2 23 12/02/2020    BUN 7 12/02/2020    CREATININE 0.3 (L) 12/02/2020    GLUCOSE 102 (H) 12/02/2020    CALCIUM 9.1 12/02/2020    PROT 6.8 12/02/2020    LABALBU 4.4 12/02/2020    BILITOT 0.5 12/02/2020    ALKPHOS 73 12/02/2020    AST 25 12/02/2020    ALT 29 12/02/2020    LABGLOM >60 12/02/2020    GFRAA >60 12/02/2020     Lab Results   Component Value Date     04/30/2011     Lab Results   Component Value Date    TSHHS 1.810 11/06/2019     Immunology:  Lab Results   Component Value Date    PROT 6.8 12/02/2020     Anemia Panel:  Lab Results   Component Value Date    IRAPWUVK35 443.0 12/02/2020    FOLATE 18.1 (H) 12/02/2020     Tumor Markers:  Lab Results   Component Value Date    PSA <0.1 12/02/2020      Observations:  No data recorded     Assessment & Plan:    1. He has treatment naïve metastatic prostate cancer with involvement of the bone, baldder and pelvic lymph node. We went over NCCN guideline. Due to the lack of visceral disease, I offer with Lupron and Zytiga plus prednisone. He understand the palliative goal of the therapy. I plan to have follow-up imaging study and PSA in 3 months. Urologist started him on Eligard. He is due for eligard in January 2020. He started zytiga and prednisone in July 2019. PSA in August 2019 was 0.9. PSA in October 2019 was 0.2. Clinically he responded. CT abdomen and pelvis and bone scan in October 2019 showed stable findings. CT chest in January 2020 showed 0.7 cm cavitary lesion. PSA in January 2020 was 0.08. PSA in April 2020 was 0.08. Labs in August 2020 were reviewed.   He will continue with Lupron plus Zytiga and prednisone. Bone scan, CT abdomen pelvis would be optional for now. Labs in December 2020 was reviewed. Clinically he has stable disease. I will recheck PSA and other labs today and with next OV. I advise to call for result. He will have lupron injection today. He is adherent to zytiga. 2.  CT chest in July 2019 showed nonspecific 0.8 cm RUL nodule. F/u in 6 months was recommended. He will have follow-up CT chest in August 2021. 3. He has Bronson's muscular dystrophy. I referred him to see Genetic counselor- No pathogenic mutations noted. Lilyan Bamberger has discussed with him. 4. We discussed about healthy diet and lifestyle. 5. B-12 deficiency. He will continue with B-12 injection monthly. Spouse has given B-12 injection. I checked  B12 level today. Return to clinic in 3 months or sooner. All of his question has been answered for today. Recent imaging and labs were reviewed and discussed with the patient.

## 2021-03-09 ENCOUNTER — HOSPITAL ENCOUNTER (OUTPATIENT)
Dept: INFUSION THERAPY | Age: 56
Discharge: HOME | End: 2021-03-09
Payer: MEDICARE

## 2021-03-09 ENCOUNTER — OFFICE VISIT (OUTPATIENT)
Dept: ONCOLOGY | Age: 56
End: 2021-03-09
Payer: MEDICARE

## 2021-03-09 VITALS
TEMPERATURE: 98.1 F | HEART RATE: 92 BPM | RESPIRATION RATE: 16 BRPM | SYSTOLIC BLOOD PRESSURE: 125 MMHG | OXYGEN SATURATION: 100 % | DIASTOLIC BLOOD PRESSURE: 74 MMHG

## 2021-03-09 DIAGNOSIS — C61 MALIGNANT NEOPLASM OF PROSTATE (HCC): Primary | ICD-10-CM

## 2021-03-09 LAB
ALBUMIN SERPL-MCNC: 4.2 GM/DL (ref 3.4–5)
ALP BLD-CCNC: 63 IU/L (ref 40–128)
ALT SERPL-CCNC: 23 U/L (ref 10–40)
ANION GAP SERPL CALCULATED.3IONS-SCNC: 12 MMOL/L (ref 4–16)
AST SERPL-CCNC: 24 IU/L (ref 15–37)
BASOPHILS ABSOLUTE: 0 K/CU MM
BASOPHILS RELATIVE PERCENT: 0.2 % (ref 0–1)
BILIRUB SERPL-MCNC: 0.5 MG/DL (ref 0–1)
BUN BLDV-MCNC: 12 MG/DL (ref 6–23)
CALCIUM SERPL-MCNC: 8.6 MG/DL (ref 8.3–10.6)
CHLORIDE BLD-SCNC: 104 MMOL/L (ref 99–110)
CO2: 25 MMOL/L (ref 21–32)
CREAT SERPL-MCNC: 0.4 MG/DL (ref 0.9–1.3)
DIFFERENTIAL TYPE: ABNORMAL
EOSINOPHILS ABSOLUTE: 0.2 K/CU MM
EOSINOPHILS RELATIVE PERCENT: 4.1 % (ref 0–3)
FOLATE: 14.3 NG/ML (ref 3.1–17.5)
GFR AFRICAN AMERICAN: >60 ML/MIN/1.73M2
GFR NON-AFRICAN AMERICAN: >60 ML/MIN/1.73M2
GLUCOSE BLD-MCNC: 113 MG/DL (ref 70–99)
HCT VFR BLD CALC: 37.2 % (ref 42–52)
HEMOGLOBIN: 12.4 GM/DL (ref 13.5–18)
LYMPHOCYTES ABSOLUTE: 1.3 K/CU MM
LYMPHOCYTES RELATIVE PERCENT: 25 % (ref 24–44)
MCH RBC QN AUTO: 30.3 PG (ref 27–31)
MCHC RBC AUTO-ENTMCNC: 33.3 % (ref 32–36)
MCV RBC AUTO: 91 FL (ref 78–100)
MONOCYTES ABSOLUTE: 0.3 K/CU MM
MONOCYTES RELATIVE PERCENT: 6.3 % (ref 0–4)
PDW BLD-RTO: 13.6 % (ref 11.7–14.9)
PLATELET # BLD: 134 K/CU MM (ref 140–440)
PMV BLD AUTO: 12.3 FL (ref 7.5–11.1)
POTASSIUM SERPL-SCNC: 3.8 MMOL/L (ref 3.5–5.1)
RBC # BLD: 4.09 M/CU MM (ref 4.6–6.2)
SEGMENTED NEUTROPHILS ABSOLUTE COUNT: 3.3 K/CU MM
SEGMENTED NEUTROPHILS RELATIVE PERCENT: 64.4 % (ref 36–66)
SODIUM BLD-SCNC: 141 MMOL/L (ref 135–145)
TOTAL PROTEIN: 6.1 GM/DL (ref 6.4–8.2)
VITAMIN B-12: 315.5 PG/ML (ref 211–911)
WBC # BLD: 5.1 K/CU MM (ref 4–10.5)

## 2021-03-09 PROCEDURE — 84154 ASSAY OF PSA FREE: CPT

## 2021-03-09 PROCEDURE — 82607 VITAMIN B-12: CPT

## 2021-03-09 PROCEDURE — 84153 ASSAY OF PSA TOTAL: CPT

## 2021-03-09 PROCEDURE — 1036F TOBACCO NON-USER: CPT | Performed by: INTERNAL MEDICINE

## 2021-03-09 PROCEDURE — 6360000002 HC RX W HCPCS: Performed by: INTERNAL MEDICINE

## 2021-03-09 PROCEDURE — 3017F COLORECTAL CA SCREEN DOC REV: CPT | Performed by: INTERNAL MEDICINE

## 2021-03-09 PROCEDURE — 96372 THER/PROPH/DIAG INJ SC/IM: CPT

## 2021-03-09 PROCEDURE — 82746 ASSAY OF FOLIC ACID SERUM: CPT

## 2021-03-09 PROCEDURE — G8482 FLU IMMUNIZE ORDER/ADMIN: HCPCS | Performed by: INTERNAL MEDICINE

## 2021-03-09 PROCEDURE — 99214 OFFICE O/P EST MOD 30 MIN: CPT | Performed by: INTERNAL MEDICINE

## 2021-03-09 PROCEDURE — 36415 COLL VENOUS BLD VENIPUNCTURE: CPT

## 2021-03-09 PROCEDURE — 96402 CHEMO HORMON ANTINEOPL SQ/IM: CPT

## 2021-03-09 PROCEDURE — 80053 COMPREHEN METABOLIC PANEL: CPT

## 2021-03-09 PROCEDURE — G8420 CALC BMI NORM PARAMETERS: HCPCS | Performed by: INTERNAL MEDICINE

## 2021-03-09 PROCEDURE — G8427 DOCREV CUR MEDS BY ELIG CLIN: HCPCS | Performed by: INTERNAL MEDICINE

## 2021-03-09 PROCEDURE — 85025 COMPLETE CBC W/AUTO DIFF WBC: CPT

## 2021-03-09 RX ORDER — SODIUM FLUORIDE 5 MG/G
GEL, DENTIFRICE DENTAL
COMMUNITY
End: 2021-03-24

## 2021-03-09 RX ADMIN — LEUPROLIDE ACETATE 22.5 MG: KIT SUBCUTANEOUS at 14:12

## 2021-03-09 NOTE — PROGRESS NOTES
Here for Huy. Brought back to treatment suite after OV. Injection administered as ordered. Tolerated well. Reviewed AVS, copy given to patient. Discharged to spouse in stable condition.

## 2021-03-09 NOTE — PROGRESS NOTES
MA Rooming Questions  Patient: Lisa Rafiq  MRN: [id number]    Date: 3/9/2021        1. Do you have any new issues? yes - states feeling something in chest when he gets up and down; states feeling chest pains in chest.          2. Do you need any refills on medications?    no    3. Have you had any imaging done since your last visit?   no    4. Have you been hospitalized or seen in the emergency room since your last visit here?   no    5. Did the patient have a depression screening completed today?  No    No data recorded     PHQ-9 Given to (if applicable):               PHQ-9 Score (if applicable):                     [] Positive     []  Negative              Does question #9 need addressed (if applicable)                     [] Yes    []  No               Chaya Trevizo MA

## 2021-03-24 ENCOUNTER — VIRTUAL VISIT (OUTPATIENT)
Dept: FAMILY MEDICINE CLINIC | Age: 56
End: 2021-03-24
Payer: MEDICARE

## 2021-03-24 DIAGNOSIS — G71.00 MUSCULAR DYSTROPHY (HCC): Primary | Chronic | ICD-10-CM

## 2021-03-24 DIAGNOSIS — R60.0 EDEMA OF EXTREMITIES: ICD-10-CM

## 2021-03-24 DIAGNOSIS — J84.9 INTERSTITIAL PULMONARY DISEASE, UNSPECIFIED (HCC): ICD-10-CM

## 2021-03-24 PROCEDURE — 99442 PR PHYS/QHP TELEPHONE EVALUATION 11-20 MIN: CPT | Performed by: FAMILY MEDICINE

## 2021-03-24 RX ORDER — TRIAMTERENE AND HYDROCHLOROTHIAZIDE 37.5; 25 MG/1; MG/1
1 TABLET ORAL DAILY PRN
Qty: 20 TABLET | Refills: 2 | Status: SHIPPED | OUTPATIENT
Start: 2021-03-24 | End: 2021-07-23 | Stop reason: SDUPTHER

## 2021-03-24 ASSESSMENT — PATIENT HEALTH QUESTIONNAIRE - PHQ9
SUM OF ALL RESPONSES TO PHQ QUESTIONS 1-9: 0
2. FEELING DOWN, DEPRESSED OR HOPELESS: 0
SUM OF ALL RESPONSES TO PHQ QUESTIONS 1-9: 0
1. LITTLE INTEREST OR PLEASURE IN DOING THINGS: 0
SUM OF ALL RESPONSES TO PHQ9 QUESTIONS 1 & 2: 0

## 2021-03-24 NOTE — PROGRESS NOTES
Geronimo Johnston is a 63 y.o. male evaluated via telephone on 3/24/2021. Consent:  He and/or health care decision maker is aware that that he may receive a bill for this telephone service, depending on his insurance coverage, and has provided verbal consent to proceed: Yes      Documentation:  I communicated with the patient and/or health care decision maker about prostate cancer, muscular dystrophy and edema. .   Details of this discussion including any medical advice provided:     Patient is concerned about a crackling sound that he makes when he lays down at nighttime breathes. He denies being short of breath. Differential on this would be is known pulmonary fibrosis versus volume overload is the also known to have some swollen feet bilaterally. Patient is willing to try a water pill when needed to see if symptoms improve. Patient notes that his treatment for prostate cancer is tolerable. He is currently down off of the prednisone. He continues on the other. Patient to start Maxide 37.5/25 1 every morning as needed edema #20 and 2 refills. Please continue to wear support socks. Follow-up earlier if not improving would look towards inhaled steroids like Pulmicort Turbuhaler. Follow-up 4 months    I affirm this is a Patient Initiated Episode with a Patient who has not had a related appointment within my department in the past 7 days or scheduled within the next 24 hours. Patient identification was verified at the start of the visit: Yes    Total Time: minutes: 11-20 minutes    The visit was conducted pursuant to the emergency declaration under the [facility], [facility] authority and the [employer] Act. Patient identification was verified, and a caregiver was present when appropriate. The patient was located in a state where the provider was credentialed to provide care.     Note: not billable if this call serves to triage the patient into an appointment for the relevant concern      Hope Certain

## 2021-04-20 DIAGNOSIS — C61 MALIGNANT NEOPLASM OF PROSTATE (HCC): ICD-10-CM

## 2021-04-20 RX ORDER — ABIRATERONE ACETATE 250 MG/1
1000 TABLET ORAL DAILY
Qty: 120 TABLET | Refills: 2 | Status: SHIPPED | OUTPATIENT
Start: 2021-04-20 | End: 2021-04-22 | Stop reason: SDUPTHER

## 2021-04-22 DIAGNOSIS — C61 MALIGNANT NEOPLASM OF PROSTATE (HCC): Primary | ICD-10-CM

## 2021-04-22 RX ORDER — ABIRATERONE ACETATE 250 MG/1
1000 TABLET ORAL DAILY
Qty: 120 TABLET | Refills: 2 | Status: SHIPPED | OUTPATIENT
Start: 2021-04-22 | End: 2021-08-26 | Stop reason: SDUPTHER

## 2021-04-26 DIAGNOSIS — C79.51 PROSTATE CANCER METASTATIC TO BONE (HCC): Primary | Chronic | ICD-10-CM

## 2021-04-26 DIAGNOSIS — C61 PROSTATE CANCER METASTATIC TO BONE (HCC): Primary | Chronic | ICD-10-CM

## 2021-04-26 RX ORDER — PREDNISONE 1 MG/1
5 TABLET ORAL DAILY
Qty: 30 TABLET | Refills: 12 | Status: SHIPPED | OUTPATIENT
Start: 2021-04-26 | End: 2022-05-03 | Stop reason: SDUPTHER

## 2021-05-03 NOTE — PROGRESS NOTES
Patient Name: Jessica Camara  Patient : 1965  Patient MRN: [id number]     Primary Oncologist: Khushboo Esteban MD  Referring Provider: Lalitha Javier DO     Date of Service: 2021      Chief Complaint:   Chief Complaint   Patient presents with    Follow-up     He came in for follow-up visit. Patient Active Problem List:     Muscular dystrophy      Prostate cancer metastatic to bone      Anemia, unspecified     Malignant neoplasm of prostate     Family history of malignant neoplasm of breast     Encounter for nonprocreative genetic counseling     Interstitial pulmonary disease, unspecified     HPI:   Sol Sarah is a pleasant 69-year-old  male patient was referred for this of the diagnosis of prostate cancer involving the bone, bladder and pelvic lymph node. Initially he presented with gross hematuria in 2019. He went to see family doctor and had an ultrasound of the bladder. 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 Invasive prostatic adenocarcinoma considered less likely. [facility] PSA in 2019 was 29.51.  CT abdomen and pelvis on 2019 showed 3.7 cm intraluminal bladder mass likely representing transitional cell carcinoma rather than extension of the prostate. Chest x-ray showed no acute infiltrative process. On May 14, 2019 he underwent cystoscopy with transurethral resection of the bladder tumor, 2 cm in size and transrectal ultrasound-guided prostate needle biopsy. Pathology report of left prostate biopsy showed adenocarcinoma Mico score 4+4. Number cores positive out of 12. Proportion of prostatic tissue involved by tumor was 17%. Perineural invasion was seen. Pathology report of urinary bladder biopsy showed prostatic adenocarcinoma.   MRI of pelvis on 2019 showed large mass left peripheral zone extraprostatic extension including left seminal vesicle involvement and left neurovascular bundle involvement,  Multiple  enlarged lymph nodes, left pelvic side lymph node 1.0 x 2.0 cm, left pelvic sidewall lymph node 1.1 x 1.1 cm, right pelvic lymph node 1.5 cm. Bone scan on June 6, 2019 showed skeletal metastatic disease in the lower lumbar spine, sacrum and along the left sacroiliac joint. At present time he denies any specific bone pain. We went over NCCN guideline. Due to the lack of visceral disease and high tumor burden, I offered androgen deprivation therapy. He refused to consider surgical castration. I started him on Casodex prior to androgen deprivation therapy. I scheduled for chemo class. We discussed the potential role of radium therapy if he has more symptoms related to the bone cancer. We discussed the role of provenge/vaccination. He understands well about his prognosis and the goal of the therapy which is palliative. He is agreeable to consider genetic counseling. BMP in April 2019 was unremarkable. He received Eligard injection by urologist.  He finished casodex and started zytiga 7/2019. I reminded him to take prednisone along with zytiga. CT chest in July 2019 showed nonspecific 0.8 cm semisolid noncalcified nodule int RUL. Follow up in 6 months was recommended. So far he tolerated zytiga and prednisone. PSA in August 2019 was 0.9. Bone scan and CT abdomen pelvis in October 2019 showed stable metastatic disease to the bone. PSA was 0.2. Labs in January 2020 were reviewed. PSA was 0.08. CT chest in January 2020 showed 0.7 cm cavitary lesion. He was found to have B-12 deficiency. He will continue B-12 injection. He had lupron injection by urologist in January 2020. CT chest on April 27. 2020 :  1. Stable 7 mm partially cavitary nodule within the right apex. Recommend follow-up CT in 3 months or per clinical protocol.   2. Mild emphysematous changes with no other acute cardiopulmonary findings. PSA in April 2020 was 0.04. Sister has given B-12 injection. CBC is stable. B-12 improved. CT chest 8/7/2020: Stable 7 mm pulmonary nodule seen within the right upper lobe recommend follow-up CT scan in 1 year's time. PSA was 0.01 in August 2020. Bone scan and CT abdomen would be optional for now unless his PSA starts rising or he has symptoms suggesting of progression of the disease. Maryjo Smijake He has nonspecific rest which he attributes to the prednisone. He takes only 5 mg a day. Weight has been fluctuating but stable. He takes Claritin. He had Lupron injection in September 2020. Spouse has given B12 injection at home. He reports he has ongoing hives with prednisone of which he is taking 5 mg daily. He uses pepcid bid and claritin. He has occasional hot flushes. PSA in 12/2/2020 was <0.1. Vitamin B-12 was 443, folate 18.1. CMP was unremarkable. CBC was stable. On June 1, 2021 he came in for follow-up visit. He had lupron on March 9, 2021. He will have Lupron injection today. He is agreeable to have follow-up CT chest to reassess the lung nodule in August 2021. I will check B12 level today. He already stopped taking the B12 injections. I will check PSA with the next office visit. He has no new symptoms concerning for progression of disease. No acute pain denied any nausea, vomiting or diarrhea. No dysuria or hematuria. No cough or headache. Due to muscular dystrophy here he has been using wheelchair. He considers Covid vaccine    Past Medical History  Prostate cancer Leslie score 8, Bronson's muscular dystrophy. Surgical History  Colonoscopy and extirpation of lesion of colon     Social History  He smoked 1 to 2 pack/day for more than 20 years and quit on August 22, 2012. He denies any alcohol or illicit drug use. He has twin sons.     Family History  Father: Colorectal cancer  Mother: Breast cancer  Grandfather - Maternal (2nd Degree): Lung cancer  Grandmother - Maternal (2nd (H) 03/09/2021    BANDABS 0.10 01/21/2020    SEGSABS 3.3 03/09/2021    EOSABS 0.2 03/09/2021    BASOSABS 0.0 03/09/2021    LYMPHSABS 1.3 03/09/2021    MONOSABS 0.3 03/09/2021    DIFFTYPE AUTOMATED DIFFERENTIAL 03/09/2021    ANISOCYTOSIS 1+ 10/30/2019    POLYCHROM 1+ 01/21/2020    WBCMORP OCCASIONAL  ATYPICAL LYMPH(S) NOTED   01/21/2020    PLTM FEW 04/27/2020     Chemistry:  Lab Results   Component Value Date     03/09/2021    K 3.8 03/09/2021     03/09/2021    CO2 25 03/09/2021    BUN 12 03/09/2021    CREATININE 0.4 (L) 03/09/2021    GLUCOSE 113 (H) 03/09/2021    CALCIUM 8.6 03/09/2021    PROT 6.1 (L) 03/09/2021    LABALBU 4.2 03/09/2021    BILITOT 0.5 03/09/2021    ALKPHOS 63 03/09/2021    AST 24 03/09/2021    ALT 23 03/09/2021    LABGLOM >60 03/09/2021    GFRAA >60 03/09/2021     Lab Results   Component Value Date     04/30/2011     Lab Results   Component Value Date    TSHHS 1.810 11/06/2019     Immunology:  Lab Results   Component Value Date    PROT 6.1 (L) 03/09/2021     Anemia Panel:  Lab Results   Component Value Date    EGYIOGRL36 315.5 03/09/2021    FOLATE 14.3 03/09/2021     Tumor Markers:  Lab Results   Component Value Date    PSA <0.1 03/09/2021      Observations:  PHQ-9 Total Score: 0 (6/1/2021  1:19 PM)     Assessment & Plan:    1. He has treatment naïve metastatic prostate cancer with involvement of the bone, baldder and pelvic lymph node. We went over NCCN guideline. Due to the lack of visceral disease, I offered Lupron and Zytiga plus prednisone. He understand the palliative goal of the therapy. I plan to have follow-up imaging study and PSA in 3 months. Urologist started him on Eligard. He started zytiga and prednisone in July 2019. PSA in August 2019 was 0.9. PSA in October 2019 was 0.2. Clinically he responded. CT abdomen and pelvis and bone scan in October 2019 showed stable findings. CT chest in January 2020 showed 0.7 cm cavitary lesion.   PSA in January 2020 was 0.08. PSA in April 2020 was 0.08. Labs in August 2020 were reviewed. He will continue with Lupron plus Zytiga and prednisone. Bone scan, CT abdomen pelvis would be optional for now. Labs in December 2020 was reviewed. He is agreeable to continue with current treatment. He is due for Lupron injection today. He has been adherent to Zytiga and prednisone. Clinically he has stable disease. 2.  CT chest in July 2019 showed nonspecific 0.8 cm RUL nodule. F/u in 6 months was recommended. He will have follow-up CT chest in August 2021. 3. He has Bronson's muscular dystrophy. I referred him to see Genetic counselor- No pathogenic mutations noted. Quinton Murguia has discussed with him. 4. B-12 deficiency. He stopped B-12 injection monthly. Spouse has given B-12 injection. I checked  B12 level today. 5.  I will order bone density test.    Discussed about healthy lifestyle and diet. He will consider Covid vaccine. Return to clinic in 3 months or sooner. All of his question has been answered for today. Recent imaging and labs were reviewed and discussed with the patient.

## 2021-06-01 ENCOUNTER — HOSPITAL ENCOUNTER (OUTPATIENT)
Dept: INFUSION THERAPY | Age: 56
Discharge: HOME | End: 2021-06-01
Payer: MEDICARE

## 2021-06-01 ENCOUNTER — TELEPHONE (OUTPATIENT)
Dept: INFUSION THERAPY | Age: 56
End: 2021-06-01

## 2021-06-01 ENCOUNTER — OFFICE VISIT (OUTPATIENT)
Dept: ONCOLOGY | Age: 56
End: 2021-06-01
Payer: MEDICARE

## 2021-06-01 VITALS
BODY MASS INDEX: 24.37 KG/M2 | HEIGHT: 69 IN | TEMPERATURE: 96.3 F | HEART RATE: 83 BPM | SYSTOLIC BLOOD PRESSURE: 122 MMHG | OXYGEN SATURATION: 96 % | DIASTOLIC BLOOD PRESSURE: 69 MMHG

## 2021-06-01 DIAGNOSIS — C61 PROSTATE CANCER METASTATIC TO BONE (HCC): ICD-10-CM

## 2021-06-01 DIAGNOSIS — C79.51 PROSTATE CANCER METASTATIC TO BONE (HCC): ICD-10-CM

## 2021-06-01 DIAGNOSIS — C61 MALIGNANT NEOPLASM OF PROSTATE (HCC): Primary | ICD-10-CM

## 2021-06-01 DIAGNOSIS — R91.1 LUNG NODULE: ICD-10-CM

## 2021-06-01 DIAGNOSIS — Z79.899 NEED FOR PROPHYLACTIC CHEMOTHERAPY: Primary | ICD-10-CM

## 2021-06-01 DIAGNOSIS — T38.7X5A ANDROGEN-INDUCED OSTEOPOROSIS: Primary | ICD-10-CM

## 2021-06-01 DIAGNOSIS — M81.8 ANDROGEN-INDUCED OSTEOPOROSIS: Primary | ICD-10-CM

## 2021-06-01 LAB
BASOPHILS ABSOLUTE: 0 K/CU MM
BASOPHILS RELATIVE PERCENT: 0.3 % (ref 0–1)
DIFFERENTIAL TYPE: ABNORMAL
EOSINOPHILS ABSOLUTE: 0.3 K/CU MM
EOSINOPHILS RELATIVE PERCENT: 5 % (ref 0–3)
HCT VFR BLD CALC: 38.9 % (ref 42–52)
HEMOGLOBIN: 13.1 GM/DL (ref 13.5–18)
LYMPHOCYTES ABSOLUTE: 1.7 K/CU MM
LYMPHOCYTES RELATIVE PERCENT: 26.3 % (ref 24–44)
MCH RBC QN AUTO: 30.6 PG (ref 27–31)
MCHC RBC AUTO-ENTMCNC: 33.7 % (ref 32–36)
MCV RBC AUTO: 90.9 FL (ref 78–100)
MONOCYTES ABSOLUTE: 0.4 K/CU MM
MONOCYTES RELATIVE PERCENT: 6.6 % (ref 0–4)
PDW BLD-RTO: 13.8 % (ref 11.7–14.9)
PLATELET # BLD: 177 K/CU MM (ref 140–440)
PMV BLD AUTO: 11.6 FL (ref 7.5–11.1)
RBC # BLD: 4.28 M/CU MM (ref 4.6–6.2)
SEGMENTED NEUTROPHILS ABSOLUTE COUNT: 4 K/CU MM
SEGMENTED NEUTROPHILS RELATIVE PERCENT: 61.8 % (ref 36–66)
WBC # BLD: 6.5 K/CU MM (ref 4–10.5)

## 2021-06-01 PROCEDURE — G8420 CALC BMI NORM PARAMETERS: HCPCS | Performed by: INTERNAL MEDICINE

## 2021-06-01 PROCEDURE — 85025 COMPLETE CBC W/AUTO DIFF WBC: CPT

## 2021-06-01 PROCEDURE — 6360000002 HC RX W HCPCS: Performed by: INTERNAL MEDICINE

## 2021-06-01 PROCEDURE — 82746 ASSAY OF FOLIC ACID SERUM: CPT

## 2021-06-01 PROCEDURE — 3017F COLORECTAL CA SCREEN DOC REV: CPT | Performed by: INTERNAL MEDICINE

## 2021-06-01 PROCEDURE — 1036F TOBACCO NON-USER: CPT | Performed by: INTERNAL MEDICINE

## 2021-06-01 PROCEDURE — 96402 CHEMO HORMON ANTINEOPL SQ/IM: CPT

## 2021-06-01 PROCEDURE — 82607 VITAMIN B-12: CPT

## 2021-06-01 PROCEDURE — G8427 DOCREV CUR MEDS BY ELIG CLIN: HCPCS | Performed by: INTERNAL MEDICINE

## 2021-06-01 PROCEDURE — 36415 COLL VENOUS BLD VENIPUNCTURE: CPT

## 2021-06-01 PROCEDURE — 99213 OFFICE O/P EST LOW 20 MIN: CPT | Performed by: INTERNAL MEDICINE

## 2021-06-01 RX ADMIN — LEUPROLIDE ACETATE 22.5 MG: KIT SUBCUTANEOUS at 13:48

## 2021-06-01 ASSESSMENT — PATIENT HEALTH QUESTIONNAIRE - PHQ9
1. LITTLE INTEREST OR PLEASURE IN DOING THINGS: 0
2. FEELING DOWN, DEPRESSED OR HOPELESS: 0
SUM OF ALL RESPONSES TO PHQ9 QUESTIONS 1 & 2: 0
SUM OF ALL RESPONSES TO PHQ QUESTIONS 1-9: 0

## 2021-06-01 NOTE — PROGRESS NOTES
MA Rooming Questions  Patient: Mj Hollis  MRN: [id number]    Date: 6/1/2021        1. Do you have any new issues?   no         2. Do you need any refills on medications?    no    3. Have you had any imaging done since your last visit? yes - 03//09    4. Have you been hospitalized or seen in the emergency room since your last visit here?   no    5. Did the patient have a depression screening completed today?  Yes    PHQ-9 Total Score: 0 (6/1/2021  1:19 PM)       PHQ-9 Given to (if applicable):               PHQ-9 Score (if applicable):                     [] Positive     []  Negative              Does question #9 need addressed (if applicable)                     [] Yes    []  No               Kavitha Mehta CMA

## 2021-06-01 NOTE — PROGRESS NOTES
Assisted to infusion area after office visit. Eligard injection administered as ordered. Tolerated well. Assisted to checkout to schedule return appts.

## 2021-06-01 NOTE — TELEPHONE ENCOUNTER
Called to give pt his next injection appts as he left while I was at lunch; pt said he was able to see these on his pt my chart

## 2021-06-04 ENCOUNTER — TELEPHONE (OUTPATIENT)
Dept: ONCOLOGY | Age: 56
End: 2021-06-04

## 2021-06-04 LAB
FOLATE: 11.7 NG/ML (ref 3.1–17.5)
VITAMIN B-12: 429.7 PG/ML (ref 211–911)

## 2021-06-04 NOTE — TELEPHONE ENCOUNTER
Called patient regarding his Dexa, spoke with his sister Dionna Angry. The appointment is [phone] at [phone], they might have to reschedule. Also went over prep and she stated understanding.

## 2021-06-08 ENCOUNTER — HOSPITAL ENCOUNTER (OUTPATIENT)
Dept: WOMENS IMAGING | Age: 56
Discharge: HOME | End: 2021-06-08
Payer: MEDICARE

## 2021-06-08 DIAGNOSIS — M81.8 ANDROGEN-INDUCED OSTEOPOROSIS: ICD-10-CM

## 2021-06-08 DIAGNOSIS — T38.7X5A ANDROGEN-INDUCED OSTEOPOROSIS: ICD-10-CM

## 2021-06-08 PROCEDURE — 77080 DXA BONE DENSITY AXIAL: CPT

## 2021-06-17 ENCOUNTER — CLINICAL DOCUMENTATION (OUTPATIENT)
Dept: ONCOLOGY | Age: 56
End: 2021-06-17

## 2021-06-17 ENCOUNTER — TELEPHONE (OUTPATIENT)
Dept: INFUSION THERAPY | Age: 56
End: 2021-06-17

## 2021-06-17 NOTE — PROGRESS NOTES
Spoke to patient. Informed him that Dr Rod Gambino has ordered Zometa every 6 months. Discussed order and bone scan results with patient. Patient verbalized understanding of order and plan of care. Patient sent to  to schedule.

## 2021-06-17 NOTE — TELEPHONE ENCOUNTER
Called to schedule pt for his zometa tx. Pt was unaware he was getting this & had questions; call transferred to SHIRA Murillo to answer his questions then pt was scheduled for 7/21 @ 10:15; pt voiced understanding.

## 2021-06-22 ENCOUNTER — CLINICAL DOCUMENTATION (OUTPATIENT)
Dept: ONCOLOGY | Age: 56
End: 2021-06-22

## 2021-06-22 NOTE — PROGRESS NOTES
Patient left a voicemail for Savanah Abreu with [facility] on the medication hotline. Called patient back and explained Savanah Abreu is the FN with [facility] who was calling to assist with process a Zytiga renewal wayne for PAP as his ends 7/7/2021. Advised patient [facility] processed his application for PAP and they still process for oral meds but I can assist. He will drop the paperwork off to me ASAP, I will fill out the HCP form, obtain signatures from Dr. Viola Wasserman and fax to [employer] to renew his application.

## 2021-06-23 ENCOUNTER — TELEPHONE (OUTPATIENT)
Dept: ONCOLOGY | Age: 56
End: 2021-06-23

## 2021-07-05 NOTE — PROGRESS NOTES
including left seminal vesicle involvement and left neurovascular bundle involvement,  Multiple  enlarged lymph nodes, left pelvic side lymph node 1.0 x 2.0 cm, left pelvic sidewall lymph node 1.1 x 1.1 cm, right pelvic lymph node 1.5 cm. Bone scan on June 6, 2019 showed skeletal metastatic disease in the lower lumbar spine, sacrum and along the left sacroiliac joint. At present time he denies any specific bone pain. We went over NCCN guideline. Due to the lack of visceral disease and high tumor burden, I offered androgen deprivation therapy. He refused to consider surgical castration. I started him on Casodex prior to androgen deprivation therapy. I scheduled for chemo class. We discussed the potential role of radium therapy if he has more symptoms related to the bone cancer. We discussed the role of provenge/vaccination. He understands well about his prognosis and the goal of the therapy which is palliative. He is agreeable to consider genetic counseling. BMP in April 2019 was unremarkable. He received Eligard injection by urologist.  He finished casodex and started zytiga 7/2019. I reminded him to take prednisone along with zytiga. CT chest in July 2019 showed nonspecific 0.8 cm semisolid noncalcified nodule int RUL. Follow up in 6 months was recommended. So far he tolerated zytiga and prednisone. PSA in August 2019 was 0.9. Bone scan and CT abdomen pelvis in October 2019 showed stable metastatic disease to the bone. PSA was 0.2. Labs in January 2020 were reviewed. PSA was 0.08. CT chest in January 2020 showed 0.7 cm cavitary lesion. He was found to have B-12 deficiency. He will continue B-12 injection. He had lupron injection by urologist in January 2020. CT chest on April 27. 2020 :  1. Stable 7 mm partially cavitary nodule within the right apex. Recommend follow-up CT in 3 months or per clinical protocol.   2. Mild emphysematous changes with no other acute cardiopulmonary findings. PSA in April 2020 was 0.04. Sister has given B-12 injection. CBC is stable. B-12 improved. CT chest 8/7/2020: Stable 7 mm pulmonary nodule seen within the right upper lobe recommend follow-up CT scan in 1 year's time. PSA was 0.01 in August 2020. Bone scan and CT abdomen would be optional for now unless his PSA starts rising or he has symptoms suggesting of progression of the disease. Alize Cid He has nonspecific rest which he attributes to the prednisone. He takes only 5 mg a day. Weight has been fluctuating but stable. He takes Claritin. He had Lupron injection in September 2020. Spouse has given B12 injection at home. He reports he has ongoing hives with prednisone of which he is taking 5 mg daily. He uses pepcid bid and claritin. He has occasional hot flushes. PSA in 12/2/2020 was <0.1. Vitamin B-12 was 443, folate 18.1. CMP was unremarkable. CBC was stable. He had lupron on March 9, 2021. He will have Lupron injection today. He is agreeable to have follow-up CT chest to reassess the lung nodule in August 2021. I will check B12 level today. He already stopped taking the B12 injections. I will check PSA with the next office visit. On 2021-08-03 he came in for follow-up visit. He had Lupron injection on 2021-06-01. PSA in July 2021 was less than 0.1. CBC and CMP were stable. CT chest on 2021-07-06 showed  Unchanged tiny 7 mm right upper lobe solitary noncalcified pulmonary nodule  back to January 2020, very doubtful significance.  No history of smoking in  the medical chart.  Any follow-up of the chest at this point should be based  on clinical factors. Density in July 2021 showed osteoporosis. He is on bisphosphonate every 6 months. I recommend to take vitamin D and calcium. He has been having intermittent erythematous rash to upper extremities. He will call his dermatologist.  Sometimes they are itching. He has been taking Claritin also.   We will follow up with urologist in December 2021. He is agreeable to have follow-up CT chest in 1 year. He has no new symptoms concerning for progression of disease. No acute pain. Denies any nausea, vomiting or diarrhea. No fever or chills. No chest pain, shortness of breath or palpitation. No headaches or dizzy spell. No specific bone pain. No melena or hematochezia. Denied any dysuria or hematuria. He had a Covid vaccine. Past Medical History  Prostate cancer Stacey score 8, Bronson's muscular dystrophy. Surgical History  Colonoscopy and extirpation of lesion of colon     Social History  He smoked 1 to 2 pack/day for more than 20 years and quit on August 22, 2012. He denies any alcohol or illicit drug use. He has twin sons. Family History  Father: Colorectal cancer  Mother: Breast cancer  Grandfather - Maternal (2nd Degree): Lung cancer  Grandmother - Maternal (2nd Degree): Lung cancer. Current Therapy  Abiraterone + Prednisone + Leuprolide Q3M Cycle Length: 84 Number Cycles: 8 Start: C1D1 on 07/17/2019 Assoc Dx: Prostate cancer LOT: Primary Treatment Stage: JOSE Treatment Intent: [id number]/30/2020 C1 D1  Leuprolide IM (3 month), 22.5 mg  Prednisone Oral, 5 mg  Abiraterone Oral, 1000 mg qday    Review of Systems: \"Per interval history; otherwise 10 point ROS is negative. \"    Vital Signs:  /67 (Site: Right Upper Arm, Position: Sitting, Cuff Size: Medium Adult)   Pulse 70   Temp 98.9 °F (37.2 °C) (Infrared)   Ht 5' 9\" (1.753 m)   SpO2 99%   BMI 24.81 kg/m²     Physical Exam:  CONSTITUTIONAL: awake, alert, cooperative, no apparent distress   EYES: sclera clear and conjunctiva normal  ENT: Normocephalic, without obvious abnormality, atraumatic  NECK: supple, symmetrical  HEMATOLOGIC/LYMPHATIC: no cervical, supraclavicular or axillary lymphadenopathy   LUNGS: no increased work of breathing and clear to auscultation   CARDIOVASCULAR: regular rate and rhythm, normal S1 and S2, no murmur   ABDOMEN: 07/21/2021     Anemia Panel:  Lab Results   Component Value Date    PGOKTKOW28 429.7 06/01/2021    FOLATE 11.7 06/01/2021     Tumor Markers:  Lab Results   Component Value Date    PSA <0.1 07/21/2021      Observations:  No data recorded     Assessment & Plan:    1. He has treatment naïve metastatic prostate cancer with involvement of the bone, baldder and pelvic lymph node. We went over NCCN guideline. Due to the lack of visceral disease, I offered Lupron and Zytiga plus prednisone. He understand the palliative goal of the therapy. I plan to have follow-up imaging study and PSA in 3 months. Urologist started him on Eligard. He started zytiga and prednisone in July 2019. PSA in August 2019 was 0.9. PSA in October 2019 was 0.2. Clinically he responded. CT abdomen and pelvis and bone scan in October 2019 showed stable findings. CT chest in January 2020 showed 0.7 cm cavitary lesion. PSA in January 2020 was 0.08. PSA in April 2020 was 0.08. Labs in August 2020 were reviewed. Bone scan, CT abdomen pelvis would be optional for now. PSA in July 2021 was less than 0.1. He has been adherent to Zytiga and prednisone. He wants to continue with Lupron. Clinically he has stable disease. I will check PSA every 3 months. 2.  CT chest in July 2019 showed nonspecific 0.8 cm RUL nodule. F/u in 6 months was recommended. CT chest in August 2021 was reviewed. He is agreeable to a follow-up CT chest in 1 year. 3. He has Bronson's muscular dystrophy. I referred him to see Genetic counselor- No pathogenic mutations noted. Elzbieta Flowers has discussed with him. 4. B-12 deficiency. He stopped B-12 injection monthly. Spouse has given B-12 injection. B12 in June 2021 was 429.7. 5.  Bone density test 6/8/2021:. The patient's bone mineral density is in the osteoporosis range.  The  fracture risk is high. He is on Zometa every 6 months. I recommend to take vitamin D and calcium.     6.  He has intermittent erythematous rash to the upper extremities. He may call his dermatologist.  He is on Claritin. Discussed about healthy lifestyle and diet. He had Covid vaccine. Return to clinic in 3 months or sooner. All of his question has been answered for today. Recent imaging and labs were reviewed and discussed with the patient.

## 2021-07-06 ENCOUNTER — HOSPITAL ENCOUNTER (OUTPATIENT)
Dept: CT IMAGING | Age: 56
Discharge: HOME | End: 2021-07-06
Payer: MEDICARE

## 2021-07-06 DIAGNOSIS — R91.1 LUNG NODULE: ICD-10-CM

## 2021-07-06 PROCEDURE — 2580000003 HC RX 258: Performed by: INTERNAL MEDICINE

## 2021-07-06 PROCEDURE — 6360000004 HC RX CONTRAST MEDICATION: Performed by: INTERNAL MEDICINE

## 2021-07-06 PROCEDURE — 71260 CT THORAX DX C+: CPT

## 2021-07-06 RX ORDER — SODIUM CHLORIDE 0.9 % (FLUSH) 0.9 %
10 SYRINGE (ML) INJECTION PRN
Status: DISCONTINUED | OUTPATIENT
Start: 2021-07-06 | End: 2021-07-07 | Stop reason: HOSPADM

## 2021-07-06 RX ADMIN — IOPAMIDOL 75 ML: 755 INJECTION, SOLUTION INTRAVENOUS at 09:47

## 2021-07-06 RX ADMIN — SODIUM CHLORIDE, PRESERVATIVE FREE 10 ML: 5 INJECTION INTRAVENOUS at 09:45

## 2021-07-21 ENCOUNTER — HOSPITAL ENCOUNTER (OUTPATIENT)
Dept: INFUSION THERAPY | Age: 56
Discharge: HOME | End: 2021-07-21
Payer: MEDICARE

## 2021-07-21 VITALS
SYSTOLIC BLOOD PRESSURE: 111 MMHG | WEIGHT: 168 LBS | BODY MASS INDEX: 24.88 KG/M2 | HEIGHT: 69 IN | OXYGEN SATURATION: 98 % | HEART RATE: 68 BPM | TEMPERATURE: 98.1 F | DIASTOLIC BLOOD PRESSURE: 62 MMHG

## 2021-07-21 DIAGNOSIS — C61 PROSTATE CANCER METASTATIC TO BONE (HCC): Primary | ICD-10-CM

## 2021-07-21 DIAGNOSIS — C79.51 PROSTATE CANCER METASTATIC TO BONE (HCC): Primary | ICD-10-CM

## 2021-07-21 DIAGNOSIS — C61 MALIGNANT NEOPLASM OF PROSTATE (HCC): ICD-10-CM

## 2021-07-21 LAB
ALBUMIN SERPL-MCNC: 4.6 GM/DL (ref 3.4–5)
ALP BLD-CCNC: 83 IU/L (ref 40–129)
ALT SERPL-CCNC: 21 U/L (ref 10–40)
ANION GAP SERPL CALCULATED.3IONS-SCNC: 14 MMOL/L (ref 4–16)
AST SERPL-CCNC: 20 IU/L (ref 15–37)
BASOPHILS ABSOLUTE: 0 K/CU MM
BASOPHILS RELATIVE PERCENT: 0.4 % (ref 0–1)
BILIRUB SERPL-MCNC: 0.3 MG/DL (ref 0–1)
BUN BLDV-MCNC: 8 MG/DL (ref 6–23)
CALCIUM SERPL-MCNC: 9 MG/DL (ref 8.3–10.6)
CHLORIDE BLD-SCNC: 101 MMOL/L (ref 99–110)
CO2: 25 MMOL/L (ref 21–32)
CREAT SERPL-MCNC: 0.3 MG/DL (ref 0.9–1.3)
DIFFERENTIAL TYPE: ABNORMAL
EOSINOPHILS ABSOLUTE: 0.2 K/CU MM
EOSINOPHILS RELATIVE PERCENT: 3.1 % (ref 0–3)
GFR AFRICAN AMERICAN: >60 ML/MIN/1.73M2
GFR AFRICAN AMERICAN: ABNORMAL ML/MIN/1.73M2
GFR NON-AFRICAN AMERICAN: >60 ML/MIN/1.73M2
GFR NON-AFRICAN AMERICAN: ABNORMAL ML/MIN/1.73M2
GLUCOSE BLD-MCNC: 118 MG/DL (ref 70–99)
GLUCOSE BLD-MCNC: 95 MG/DL (ref 70–99)
HCT VFR BLD CALC: 40.9 % (ref 42–52)
HEMOGLOBIN: 13.8 GM/DL (ref 13.5–18)
LYMPHOCYTES ABSOLUTE: 1.3 K/CU MM
LYMPHOCYTES RELATIVE PERCENT: 17.6 % (ref 24–44)
MCH RBC QN AUTO: 30.3 PG (ref 27–31)
MCHC RBC AUTO-ENTMCNC: 33.7 % (ref 32–36)
MCV RBC AUTO: 89.7 FL (ref 78–100)
MONOCYTES ABSOLUTE: 0.4 K/CU MM
MONOCYTES RELATIVE PERCENT: 4.7 % (ref 0–4)
PDW BLD-RTO: 13.9 % (ref 11.7–14.9)
PLATELET # BLD: 190 K/CU MM (ref 140–440)
PMV BLD AUTO: 11.4 FL (ref 7.5–11.1)
POC BUN: 8 MG/DL (ref 8–26)
POC CALCIUM: 1.17 MMOL/L (ref 1.12–1.32)
POC CHLORIDE: 104 MMOL/L (ref 98–109)
POC CO2: 27 MMOL/L (ref 21–32)
POC CREATININE: <0.3 MG/DL (ref 0.9–1.3)
POTASSIUM SERPL-SCNC: 3.9 MMOL/L (ref 3.5–4.5)
POTASSIUM SERPL-SCNC: 4.5 MMOL/L (ref 3.5–5.1)
RBC # BLD: 4.56 M/CU MM (ref 4.6–6.2)
REASON FOR REJECTION: NORMAL
REJECTED TEST: NORMAL
SEGMENTED NEUTROPHILS ABSOLUTE COUNT: 5.5 K/CU MM
SEGMENTED NEUTROPHILS RELATIVE PERCENT: 74.2 % (ref 36–66)
SODIUM BLD-SCNC: 140 MMOL/L (ref 135–145)
SODIUM BLD-SCNC: 143 MMOL/L (ref 138–146)
SOURCE, BLOOD GAS: ABNORMAL
TOTAL PROTEIN: 6.2 GM/DL (ref 6.4–8.2)
WBC # BLD: 7.4 K/CU MM (ref 4–10.5)

## 2021-07-21 PROCEDURE — 6360000002 HC RX W HCPCS: Performed by: INTERNAL MEDICINE

## 2021-07-21 PROCEDURE — 80053 COMPREHEN METABOLIC PANEL: CPT

## 2021-07-21 PROCEDURE — 36415 COLL VENOUS BLD VENIPUNCTURE: CPT

## 2021-07-21 PROCEDURE — 85025 COMPLETE CBC W/AUTO DIFF WBC: CPT

## 2021-07-21 PROCEDURE — 84154 ASSAY OF PSA FREE: CPT

## 2021-07-21 PROCEDURE — 96365 THER/PROPH/DIAG IV INF INIT: CPT

## 2021-07-21 PROCEDURE — 2580000003 HC RX 258: Performed by: INTERNAL MEDICINE

## 2021-07-21 PROCEDURE — 84153 ASSAY OF PSA TOTAL: CPT

## 2021-07-21 PROCEDURE — 96367 TX/PROPH/DG ADDL SEQ IV INF: CPT

## 2021-07-21 RX ORDER — SODIUM CHLORIDE 9 MG/ML
20 INJECTION, SOLUTION INTRAVENOUS ONCE
Status: DISCONTINUED | OUTPATIENT
Start: 2021-07-21 | End: 2021-07-22 | Stop reason: HOSPADM

## 2021-07-21 RX ORDER — SODIUM CHLORIDE 0.9 % (FLUSH) 0.9 %
5-40 SYRINGE (ML) INJECTION PRN
Status: DISCONTINUED | OUTPATIENT
Start: 2021-07-21 | End: 2021-07-22 | Stop reason: HOSPADM

## 2021-07-21 RX ADMIN — ZOLEDRONIC ACID 4 MG: 0.8 INJECTION, SOLUTION, CONCENTRATE INTRAVENOUS at 11:13

## 2021-07-21 RX ADMIN — SODIUM CHLORIDE 20 ML/HR: 900 INJECTION, SOLUTION INTRAVENOUS at 11:14

## 2021-07-21 NOTE — PROGRESS NOTES
Pt. Here for zometa. Peripheral IV started in right [facility] without difficulty, good blood return noted. Lab work drawn as ordered. Labs reviewed and treatment administered without incident.

## 2021-07-23 ENCOUNTER — OFFICE VISIT (OUTPATIENT)
Dept: FAMILY MEDICINE CLINIC | Age: 56
End: 2021-07-23
Payer: MEDICARE

## 2021-07-23 VITALS
OXYGEN SATURATION: 96 % | SYSTOLIC BLOOD PRESSURE: 101 MMHG | HEIGHT: 69 IN | WEIGHT: 168 LBS | RESPIRATION RATE: 16 BRPM | DIASTOLIC BLOOD PRESSURE: 62 MMHG | HEART RATE: 76 BPM | BODY MASS INDEX: 24.88 KG/M2

## 2021-07-23 DIAGNOSIS — R60.0 EDEMA OF EXTREMITIES: ICD-10-CM

## 2021-07-23 DIAGNOSIS — G71.01 BECKER'S MUSCULAR DYSTROPHY (HCC): Primary | ICD-10-CM

## 2021-07-23 DIAGNOSIS — C61 PROSTATE CANCER METASTATIC TO BONE (HCC): Chronic | ICD-10-CM

## 2021-07-23 DIAGNOSIS — H61.23 BILATERAL IMPACTED CERUMEN: ICD-10-CM

## 2021-07-23 DIAGNOSIS — C79.51 PROSTATE CANCER METASTATIC TO BONE (HCC): Chronic | ICD-10-CM

## 2021-07-23 PROCEDURE — 99214 OFFICE O/P EST MOD 30 MIN: CPT | Performed by: STUDENT IN AN ORGANIZED HEALTH CARE EDUCATION/TRAINING PROGRAM

## 2021-07-23 PROCEDURE — 69209 REMOVE IMPACTED EAR WAX UNI: CPT | Performed by: STUDENT IN AN ORGANIZED HEALTH CARE EDUCATION/TRAINING PROGRAM

## 2021-07-23 RX ORDER — ZOLEDRONIC ACID 4 MG/5ML
4 INJECTION INTRAVENOUS
COMMUNITY

## 2021-07-23 RX ORDER — TRIAMTERENE AND HYDROCHLOROTHIAZIDE 37.5; 25 MG/1; MG/1
1 TABLET ORAL DAILY PRN
Qty: 20 TABLET | Refills: 2 | Status: SHIPPED | OUTPATIENT
Start: 2021-07-23 | End: 2021-07-23 | Stop reason: SDUPTHER

## 2021-07-23 RX ORDER — TRIAMTERENE AND HYDROCHLOROTHIAZIDE 37.5; 25 MG/1; MG/1
1 TABLET ORAL DAILY PRN
Qty: 20 TABLET | Refills: 2 | Status: SHIPPED | OUTPATIENT
Start: 2021-07-23 | End: 2021-11-19 | Stop reason: SDUPTHER

## 2021-07-23 ASSESSMENT — ENCOUNTER SYMPTOMS
SORE THROAT: 0
WHEEZING: 0
NAUSEA: 0
SHORTNESS OF BREATH: 0
ABDOMINAL PAIN: 0

## 2021-07-23 NOTE — PROGRESS NOTES
Graciela Shen, follow neuro  pulm dz. (?)  H. Prostate ca. Urticaria? 8/1/2021    Jobie Baumgarten    Chief Complaint   Patient presents with    Follow-up     Presenting for 4 month follow up visit. Previous Dr. Sondra Ramires patient.  Other     Feels tubes in ears may be clogged.  Other     feet and ankles swelling at times. HPI  History was obtained from patient. Andree Wallis is a 61 y.o. male with a PMHx as listed below who presents today for follow up on chronic conditions      Hx. Of stage 4 prostate cancer following with Dr. Dejuan Jimenez, and Urology Dr. Cordova Reason  Hx. Muscular dystrophy    PSA being monitored regualrly. Repeat in August.   Urology started on Eligard,   Now on Lupron, Zytiga, and prednisone  Currently not in pain    6/8/21 bone density osteoporosis 2/2 cancer/treatment started on zoledronic acid last treatment 7/21/21 plan is to have every 6 months. Now on pepcid twice a day. GERD stable    Use of maxzide only with peripheral swelling of feet    1. Bronson's muscular dystrophy ([address]    2. Edema of extremities    3. Prostate cancer metastatic to bone ([address]    4. Bilateral impacted cerumen             REVIEW OF SYMPTOMS    Review of Systems   Constitutional: Negative for chills and fatigue. HENT: Negative for congestion and sore throat. Respiratory: Negative for shortness of breath and wheezing. Cardiovascular: Negative for chest pain and palpitations. Gastrointestinal: Negative for abdominal pain and nausea. Genitourinary: Negative for frequency and urgency. Neurological: Negative for light-headedness.        PAST MEDICAL HISTORY  Past Medical History:   Diagnosis Date    Mixed hyperlipidemia 10/24/2019       FAMILY HISTORY  Family History   Problem Relation Age of Onset    Hypertension Mother     Colon Cancer Father     Cancer Father     Hypertension Sister     Hypertension Maternal Grandmother        SOCIAL HISTORY  Social History     Socioeconomic History    Marital status: Single     Spouse name: None    Number of children: None    Years of education: None    Highest education level: None   Occupational History    None   Tobacco Use    Smoking status: Never Smoker    Smokeless tobacco: Never Used   Vaping Use    Vaping Use: Never used   Substance and Sexual Activity    Alcohol use: Not Currently    Drug use: None    Sexual activity: None   Other Topics Concern    None   Social History Narrative    None     Social Determinants of Health     Financial Resource Strain:     Difficulty of Paying Living Expenses:    Food Insecurity:     Worried About Running Out of Food in the Last Year:     Ran Out of Food in the Last Year:    Transportation Needs:     Lack of Transportation (Medical):      Lack of Transportation (Non-Medical):    Physical Activity:     Days of Exercise per Week:     Minutes of Exercise per Session:    Stress:     Feeling of Stress :    Social Connections:     Frequency of Communication with Friends and Family:     Frequency of Social Gatherings with Friends and Family:     Attends Yazdanism Services:     Active Member of Clubs or Organizations:     Attends Club or Organization Meetings:     Marital Status:    Intimate Partner Violence:     Fear of Current or Ex-Partner:     Emotionally Abused:     Physically Abused:     Sexually Abused:         SURGICAL HISTORY  Past Surgical History:   Procedure Laterality Date    FACIAL RECONSTRUCTION SURGERY N/A 1986    NOSE SURGERY N/A 1986                 CURRENT MEDICATIONS  Current Outpatient Medications   Medication Sig Dispense Refill    zoledronic acid (ZOMETA) 4 MG/5ML injection Infuse 4 mg intravenously every 6 months      triamterene-hydroCHLOROthiazide (MAXZIDE-25) 37.5-25 MG per tablet Take 1 tablet by mouth daily as needed (feet swelling) 20 tablet 2    predniSONE (DELTASONE) 5 MG tablet Take 1 tablet by mouth daily 30 tablet 12    abiraterone acetate (ZYTIGA) 250 MG tablet Take 4 tablets by mouth daily 120 tablet 2    famotidine (PEPCID) 20 MG tablet Take 20 mg by mouth 2 times daily      loratadine (CLARITIN) 10 MG capsule Take 10 mg by mouth daily       No current facility-administered medications for this visit. ALLERGIES  Allergies   Allergen Reactions    Bupropion     Prednisone Hives       PHYSICAL EXAM    /62   Pulse 76   Resp 16   Ht 5' 9\" (1.753 m)   Wt 168 lb (76.2 kg)   SpO2 96%   BMI 24.81 kg/m²     Physical Exam  Constitutional:       Appearance: Normal appearance. HENT:      Head: Normocephalic and atraumatic. Right Ear: There is impacted cerumen. Left Ear: There is impacted cerumen. Eyes:      Extraocular Movements: Extraocular movements intact. Pupils: Pupils are equal, round, and reactive to light. Cardiovascular:      Rate and Rhythm: Normal rate and regular rhythm. Pulses: Normal pulses. Heart sounds: No murmur heard. No friction rub. No gallop. Skin:     General: Skin is warm and dry. Neurological:      General: No focal deficit present. Mental Status: He is alert. Psychiatric:         Mood and Affect: Mood normal.         Behavior: Behavior normal.         ASSESSMENT & PLAN    1. Edema of extremities  -stable, minimal use  - triamterene-hydroCHLOROthiazide (MAXZIDE-25) 37.5-25 MG per tablet; Take 1 tablet by mouth daily as needed (feet swelling)  Dispense: 20 tablet; Refill: 2    2. Bronson's muscular dystrophy ([facility] [address]  -counseled on obtaining covid vaccine, he will obtain    3. Prostate cancer metastatic to bone [facility])  -following with urology, oncology continue current regimen    4. Bilateral impacted cerumen  -unable to completely clear discussed to use OTC debrox, if not improving consider ENT      Return in about 6 months (around 1/23/2022).          Electronically signed by Latonya Dunaway DO on 8/1/2021

## 2021-08-03 ENCOUNTER — OFFICE VISIT (OUTPATIENT)
Dept: ONCOLOGY | Age: 56
End: 2021-08-03
Payer: MEDICARE

## 2021-08-03 ENCOUNTER — HOSPITAL ENCOUNTER (OUTPATIENT)
Dept: INFUSION THERAPY | Age: 56
Discharge: HOME | End: 2021-08-03
Payer: MEDICARE

## 2021-08-03 VITALS
OXYGEN SATURATION: 99 % | HEIGHT: 69 IN | SYSTOLIC BLOOD PRESSURE: 113 MMHG | HEART RATE: 70 BPM | DIASTOLIC BLOOD PRESSURE: 67 MMHG | BODY MASS INDEX: 24.81 KG/M2 | TEMPERATURE: 98.9 F

## 2021-08-03 DIAGNOSIS — C61 MALIGNANT NEOPLASM OF PROSTATE (HCC): Primary | ICD-10-CM

## 2021-08-03 PROCEDURE — 99211 OFF/OP EST MAY X REQ PHY/QHP: CPT

## 2021-08-03 PROCEDURE — G8427 DOCREV CUR MEDS BY ELIG CLIN: HCPCS | Performed by: INTERNAL MEDICINE

## 2021-08-03 PROCEDURE — G8420 CALC BMI NORM PARAMETERS: HCPCS | Performed by: INTERNAL MEDICINE

## 2021-08-03 PROCEDURE — 3017F COLORECTAL CA SCREEN DOC REV: CPT | Performed by: INTERNAL MEDICINE

## 2021-08-03 PROCEDURE — 99214 OFFICE O/P EST MOD 30 MIN: CPT | Performed by: INTERNAL MEDICINE

## 2021-08-03 PROCEDURE — 1036F TOBACCO NON-USER: CPT | Performed by: INTERNAL MEDICINE

## 2021-08-03 NOTE — PROGRESS NOTES
[place] Rooming Questions  Patient: Alirio Mcfadden  MRN: [id number]    Date: 8/3/2021        1. Do you have any new issues?   no         2. Do you need any refills on medications?    no    3. Have you had any imaging done since your last visit? yes - CT scan    4. Have you been hospitalized or seen in the emergency room since your last visit here?   no    5. Did the patient have a depression screening completed today?  No    No data recorded     PHQ-9 Given to (if applicable):               PHQ-9 Score (if applicable):                     [] Positive     []  Negative              Does question #9 need addressed (if applicable)                     [] Yes    []  No               Christofer Ron CMA

## 2021-08-24 ENCOUNTER — HOSPITAL ENCOUNTER (OUTPATIENT)
Dept: INFUSION THERAPY | Age: 56
Discharge: HOME | End: 2021-08-24
Payer: MEDICARE

## 2021-08-24 DIAGNOSIS — C61 MALIGNANT NEOPLASM OF PROSTATE (HCC): Primary | ICD-10-CM

## 2021-08-24 PROCEDURE — 6360000002 HC RX W HCPCS: Performed by: INTERNAL MEDICINE

## 2021-08-24 PROCEDURE — 96372 THER/PROPH/DIAG INJ SC/IM: CPT

## 2021-08-24 RX ADMIN — LEUPROLIDE ACETATE 22.5 MG: KIT SUBCUTANEOUS at 13:54

## 2021-08-24 NOTE — PROGRESS NOTES
Patient arrived to treatment suite for Eligard injection. No questions or concerns for the doctor at this time. Treatment approved and given subcutaneously in left abdomen, band-aid applied. Patient tolerated well. Left treatment suite with assistance in wheelchair. Discharge instructions provided.

## 2021-08-26 DIAGNOSIS — C61 MALIGNANT NEOPLASM OF PROSTATE (HCC): Primary | ICD-10-CM

## 2021-08-26 RX ORDER — ABIRATERONE ACETATE 250 MG/1
1000 TABLET ORAL DAILY
Qty: 120 TABLET | Refills: 3 | Status: SHIPPED | OUTPATIENT
Start: 2021-08-26 | End: 2021-12-13 | Stop reason: SDUPTHER

## 2021-10-05 NOTE — PROGRESS NOTES
Patient Name: Brodie Tam  Patient : 1965  Patient MRN: [id number]     Primary Oncologist: Yassine Daly MD  Referring Provider: Rizwan Farias DO     Date of Service: 2021      Chief Complaint:   Chief Complaint   Patient presents with    Follow-up     He came in for follow-up visit. Patient Active Problem List:     Muscular dystrophy      Prostate cancer metastatic to bone      Anemia, unspecified     Malignant neoplasm of prostate     Family history of malignant neoplasm of breast     Encounter for nonprocreative genetic counseling     Interstitial pulmonary disease, unspecified     HPI:   Karla Castorena is a pleasant 62-year-old  male patient was referred for this of the diagnosis of prostate cancer involving the bone, bladder and pelvic lymph node. Initially he presented with gross hematuria in 2019. He went to see family doctor and had an ultrasound of the bladder. 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 Invasive prostatic adenocarcinoma considered less likely. [facility] PSA in 2019 was 29.51.  CT abdomen and pelvis on 2019 showed 3.7 cm intraluminal bladder mass likely representing transitional cell carcinoma rather than extension of the prostate. Chest x-ray showed no acute infiltrative process. On May 14, 2019 he underwent cystoscopy with transurethral resection of the bladder tumor, 2 cm in size and transrectal ultrasound-guided prostate needle biopsy. Pathology report of left prostate biopsy showed adenocarcinoma Stacey score 4+4. Number cores positive out of 12. Proportion of prostatic tissue involved by tumor was 17%. Perineural invasion was seen. Pathology report of urinary bladder biopsy showed prostatic adenocarcinoma.   MRI of pelvis on 2019 showed large mass left peripheral zone extraprostatic extension including left seminal vesicle involvement and left neurovascular bundle involvement,  Multiple  enlarged lymph nodes, left pelvic side lymph node 1.0 x 2.0 cm, left pelvic sidewall lymph node 1.1 x 1.1 cm, right pelvic lymph node 1.5 cm. Bone scan on June 6, 2019 showed skeletal metastatic disease in the lower lumbar spine, sacrum and along the left sacroiliac joint. At present time he denies any specific bone pain. We went over NCCN guideline. Due to the lack of visceral disease and high tumor burden, I offered androgen deprivation therapy. He refused to consider surgical castration. I started him on Casodex prior to androgen deprivation therapy. I scheduled for chemo class. We discussed the potential role of radium therapy if he has more symptoms related to the bone cancer. We discussed the role of provenge/vaccination. He understands well about his prognosis and the goal of the therapy which is palliative. He is agreeable to consider genetic counseling. BMP in April 2019 was unremarkable. He received Eligard injection by urologist.  He started zytiga 7/2019. I reminded him to take prednisone along with zytiga. CT chest in July 2019 showed nonspecific 0.8 cm semisolid noncalcified nodule int RUL. Follow up in 6 months was recommended. PSA in August 2019 was 0.9. Bone scan and CT abdomen pelvis in October 2019 showed stable metastatic disease to the bone. PSA was 0.2. Labs in January 2020 were reviewed. PSA was 0.08. CT chest in January 2020 showed 0.7 cm cavitary lesion. He was found to have B-12 deficiency. He was on B-12 injection. He had lupron injection by urologist in January 2020. CT chest on April 27. 2020 :  1. Stable 7 mm partially cavitary nodule within the right apex. Recommend follow-up CT in 3 months or per clinical protocol. 2. Mild emphysematous changes with no other acute cardiopulmonary findings. PSA in April 2020 was 0.04.  Sister has given B-12 injection. CBC is stable. B-12 improved. CT chest 8/7/2020: Stable 7 mm pulmonary nodule seen within the right upper lobe recommend follow-up CT scan in 1 year's time. PSA was 0.01 in August 2020. He had Lupron injection in September 2020. Spouse has given B12 injection at home. PSA in 12/2/2020 was <0.1. Vitamin B-12 was 443, folate 18.1. CMP was unremarkable. CBC was stable. He had lupron on March 9, 2021. He stopped taking the B12 injection. He had Lupron injection on 2021-06-01. PSA in July 2021 was less than 0.1. CBC and CMP were stable. CT chest on 2021-07-06 showed  Unchanged tiny 7 mm right upper lobe solitary noncalcified pulmonary nodule back to January 2020, very doubtful significance.  No history of smoking in the medical chart.  Any follow-up of the chest at this point should be based on clinical factors. Bone density in July 2021 showed osteoporosis. He is on Zometa every 6 months. I recommended to take vitamin D and calcium. He is agreeable to have follow-up CT chest in July 2022. On November 4, 2020 when he came in for follow-up visit. PSA in October 2021 was less than 0.1. I will continue to monitor PSA every 3 to 6 months. In October 2021 he has mild anemia. I will order anemic work-up on November 16, 2021. CMP was stable for him. He has been adherent to Zytiga and prednisone. For Lupron or Eligard injection on October 16, 2021. I will order a follow-up bone scan to see the status of his metastatic disease. He has no new symptoms concerning for progression of disease. No acute pain. Denied any nausea, vomiting or diarrhea. No fever or chills. No chest pain, shortness of breath or palpitation. No headache or dizzy spell. No specific bone pain. No melena or hematochezia. Denied any dysuria or hematuria. He had a Covid vaccine. Past Medical History  Prostate cancer [place] score 8, Bronson's muscular dystrophy.     Surgical History  Colonoscopy and extirpation of lesion of colon     Social History  He smoked 1 to 2 pack/day for more than 20 years and quit on August 22, 2012. He denies any alcohol or illicit drug use. He has twin sons. Family History  Father: Colorectal cancer  Mother: Breast cancer  Grandfather - Maternal (2nd Degree): Lung cancer  Grandmother - Maternal (2nd Degree): Lung cancer. Current Therapy  Abiraterone + Prednisone + Leuprolide Q3M Cycle Length: 84 Number Cycles: 8 Start: C1D1 on 07/17/2019 Assoc Dx: Prostate cancer LOT: Primary Treatment Stage: JOSE Treatment Intent: Avthgsmw74/30/2020 C1 D1  Leuprolide IM (3 month), 22.5 mg  Prednisone Oral, 5 mg  Abiraterone Oral, 1000 mg qday    Review of Systems: \"Per interval history; otherwise 10 point ROS is negative. \"    Vital Signs:  /69 (Site: Right Upper Arm, Position: Sitting, Cuff Size: Medium Adult)   Pulse 73   Temp 96.8 °F (36 °C) (Infrared)   Ht 5' 9\" (1.753 m)   SpO2 99%   BMI 24.81 kg/m²     Physical Exam:  CONSTITUTIONAL: awake, alert, cooperative, no apparent distress   EYES: sclera clear and conjunctiva normal  ENT: Normocephalic, without obvious abnormality, atraumatic  NECK: supple, symmetrical.  No JVD or goiter. HEMATOLOGIC/LYMPHATIC: no cervical, supraclavicular or axillary lymphadenopathy   LUNGS: no increased work of breathing and clear to auscultation   CARDIOVASCULAR: regular rate and rhythm, normal S1 and S2, no murmur   ABDOMEN: normal bowel sound, soft, non-distended, non-tender, no masses palpated, no hepatosplenomegaly   MUSCULOSKELETAL: decreased strength to LE. Has been using wheelchair. NEUROLOGIC: awake, alert, oriented to name, place and time. Decreased power due to muscular dystrophy. CN II through XII grossly intact. SKIN: Normal skin color, texture, turgor and no jaundice. No petechial rash  EXTREMITIES: no LE edema. No cyanosis.      Labs:  Hematology:  Lab Results   Component Value Date    WBC 8.8 10/28/2021    RBC 4.24 (L) 10/28/2021    HGB 12.7 (L) 10/28/2021    HCT 38.8 (L) 10/28/2021    MCV 91.5 10/28/2021    MCH 30.0 10/28/2021    MCHC 32.7 10/28/2021    RDW 14.1 10/28/2021     10/28/2021    MPV 11.4 (H) 10/28/2021    BANDSPCT 2 (L) 01/21/2020    SEGSPCT 71.9 (H) 10/28/2021    EOSRELPCT 4.5 (H) 10/28/2021    BASOPCT 0.2 10/28/2021    LYMPHOPCT 17.0 (L) 10/28/2021    MONOPCT 6.4 (H) 10/28/2021    BANDABS 0.10 01/21/2020    SEGSABS 6.4 10/28/2021    EOSABS 0.4 10/28/2021    BASOSABS 0.0 10/28/2021    LYMPHSABS 1.5 10/28/2021    MONOSABS 0.6 10/28/2021    DIFFTYPE AUTOMATED DIFFERENTIAL 10/28/2021    ANISOCYTOSIS 1+ 10/30/2019    POLYCHROM 1+ 01/21/2020    WBCMORP OCCASIONAL  ATYPICAL LYMPH(S) NOTED   01/21/2020    PLTM FEW 04/27/2020     Chemistry:  Lab Results   Component Value Date     10/28/2021    K 4.3 10/28/2021    CL 99 10/28/2021    CO2 24 10/28/2021    BUN 16 10/28/2021    CREATININE 0.2 (L) 10/28/2021    GLUCOSE 102 (H) 10/28/2021    CALCIUM 9.5 10/28/2021    PROT 7.0 10/28/2021    LABALBU 4.7 10/28/2021    BILITOT 0.5 10/28/2021    ALKPHOS 73 10/28/2021    AST 20 10/28/2021    ALT 23 10/28/2021    LABGLOM >60 10/28/2021    GFRAA >60 10/28/2021    POCCA 1.17 07/21/2021    POCGLU 118 (H) 07/21/2021     Lab Results   Component Value Date     04/30/2011     Lab Results   Component Value Date    TSHHS 1.810 11/06/2019     Immunology:  Lab Results   Component Value Date    PROT 7.0 10/28/2021     Anemia Panel:  Lab Results   Component Value Date    BECFCINL91 429.7 06/01/2021    FOLATE 11.7 06/01/2021     Tumor Markers:  Lab Results   Component Value Date    PSA <0.1 10/28/2021      Observations:  No data recorded     Assessment & Plan:    1. He has treatment naïve metastatic prostate cancer with involvement of the bone, baldder and pelvic lymph node. We went over NCCN guideline. Due to the lack of visceral disease, I offered Lupron and Zytiga plus prednisone.   He understand the palliative goal of the therapy. I plan to have follow-up imaging study and PSA in 3 months. Urologist started him on Eligard. He started zytiga and prednisone in July 2019. PSA in August 2019 was 0.9. PSA in October 2019 was 0.2. Clinically he responded. CT abdomen and pelvis and bone scan in October 2019 showed stable findings. CT chest in January 2020 showed 0.7 cm cavitary lesion. PSA in January 2020 was 0.08. PSA in April 2020 was 0.08. Labs in August 2020 were reviewed. Bone scan, CT abdomen pelvis would be optional for now. PSA in July 2021 was less than 0.1. He has been adherent to Zytiga and prednisone. He wanted to continue with Lupron every 6 months. PSA in October 2021 was less than 0.1. Clinically he has stable disease. I will check PSA every 3 - 6 months. I will schedule bone scan to see the status of his metastatic disease. He is due for the Lupron injection on November 16, 2021.    2.  CT chest in July 2019 showed nonspecific 0.8 cm RUL nodule. F/u in 6 months was recommended. CT chest in August 2021 was reviewed. He is agreeable to a follow-up CT chest in 1 year. 3. He has Bronson's muscular dystrophy. I referred him to see Genetic counselor- No pathogenic mutations noted. Gregory Adams has discussed with him. 4. B-12 deficiency. He stopped B-12 injection monthly. Spouse has given B-12 injection. B12 in June 2021 was 429.7. In October 2021 he has mild anemia. I will recheck anemic work-up on November 16, 2021.    5.  Bone density test 6/8/2021:. The patient's bone mineral density is in the osteoporosis range.  The fracture risk is high. He is on Zometa every 6 months. I recommend to take vitamin D and calcium. 6.  He has intermittent erythematous rash to the upper extremities. He may call his dermatologist.  He is on Claritin. Discussed about healthy lifestyle and diet. He had Covid vaccine. Return to clinic in 3 months or sooner. All of his question has been answered for today.     Recent imaging and labs were reviewed and discussed with the patient.

## 2021-10-28 ENCOUNTER — HOSPITAL ENCOUNTER (OUTPATIENT)
Dept: INFUSION THERAPY | Age: 56
Discharge: HOME | End: 2021-10-28
Payer: MEDICARE

## 2021-10-28 DIAGNOSIS — C61 MALIGNANT NEOPLASM OF PROSTATE (HCC): ICD-10-CM

## 2021-10-28 LAB
ALBUMIN SERPL-MCNC: 4.7 GM/DL (ref 3.4–5)
ALP BLD-CCNC: 73 IU/L (ref 40–128)
ALT SERPL-CCNC: 23 U/L (ref 10–40)
ANION GAP SERPL CALCULATED.3IONS-SCNC: 13 MMOL/L (ref 4–16)
AST SERPL-CCNC: 20 IU/L (ref 15–37)
BASOPHILS ABSOLUTE: 0 K/CU MM
BASOPHILS RELATIVE PERCENT: 0.2 % (ref 0–1)
BILIRUB SERPL-MCNC: 0.5 MG/DL (ref 0–1)
BUN BLDV-MCNC: 16 MG/DL (ref 6–23)
CALCIUM SERPL-MCNC: 9.5 MG/DL (ref 8.3–10.6)
CHLORIDE BLD-SCNC: 99 MMOL/L (ref 99–110)
CO2: 24 MMOL/L (ref 21–32)
CREAT SERPL-MCNC: 0.2 MG/DL (ref 0.9–1.3)
DIFFERENTIAL TYPE: ABNORMAL
EOSINOPHILS ABSOLUTE: 0.4 K/CU MM
EOSINOPHILS RELATIVE PERCENT: 4.5 % (ref 0–3)
GFR AFRICAN AMERICAN: >60 ML/MIN/1.73M2
GFR NON-AFRICAN AMERICAN: >60 ML/MIN/1.73M2
GLUCOSE BLD-MCNC: 102 MG/DL (ref 70–99)
HCT VFR BLD CALC: 38.8 % (ref 42–52)
HEMOGLOBIN: 12.7 GM/DL (ref 13.5–18)
LYMPHOCYTES ABSOLUTE: 1.5 K/CU MM
LYMPHOCYTES RELATIVE PERCENT: 17 % (ref 24–44)
MCH RBC QN AUTO: 30 PG (ref 27–31)
MCHC RBC AUTO-ENTMCNC: 32.7 % (ref 32–36)
MCV RBC AUTO: 91.5 FL (ref 78–100)
MONOCYTES ABSOLUTE: 0.6 K/CU MM
MONOCYTES RELATIVE PERCENT: 6.4 % (ref 0–4)
PDW BLD-RTO: 14.1 % (ref 11.7–14.9)
PLATELET # BLD: 192 K/CU MM (ref 140–440)
PMV BLD AUTO: 11.4 FL (ref 7.5–11.1)
POTASSIUM SERPL-SCNC: 4.3 MMOL/L (ref 3.5–5.1)
RBC # BLD: 4.24 M/CU MM (ref 4.6–6.2)
SEGMENTED NEUTROPHILS ABSOLUTE COUNT: 6.4 K/CU MM
SEGMENTED NEUTROPHILS RELATIVE PERCENT: 71.9 % (ref 36–66)
SODIUM BLD-SCNC: 136 MMOL/L (ref 135–145)
TOTAL PROTEIN: 7 GM/DL (ref 6.4–8.2)
WBC # BLD: 8.8 K/CU MM (ref 4–10.5)

## 2021-10-28 PROCEDURE — 36415 COLL VENOUS BLD VENIPUNCTURE: CPT

## 2021-10-28 PROCEDURE — 80053 COMPREHEN METABOLIC PANEL: CPT

## 2021-10-28 PROCEDURE — 85025 COMPLETE CBC W/AUTO DIFF WBC: CPT

## 2021-10-28 PROCEDURE — 84153 ASSAY OF PSA TOTAL: CPT

## 2021-10-28 PROCEDURE — 84154 ASSAY OF PSA FREE: CPT

## 2021-11-04 ENCOUNTER — HOSPITAL ENCOUNTER (OUTPATIENT)
Dept: INFUSION THERAPY | Age: 56
Discharge: HOME | End: 2021-11-04
Payer: MEDICARE

## 2021-11-04 ENCOUNTER — OFFICE VISIT (OUTPATIENT)
Dept: ONCOLOGY | Age: 56
End: 2021-11-04
Payer: MEDICARE

## 2021-11-04 VITALS
SYSTOLIC BLOOD PRESSURE: 123 MMHG | HEIGHT: 69 IN | HEART RATE: 73 BPM | BODY MASS INDEX: 24.81 KG/M2 | DIASTOLIC BLOOD PRESSURE: 69 MMHG | OXYGEN SATURATION: 99 % | TEMPERATURE: 96.8 F

## 2021-11-04 DIAGNOSIS — D64.9 ANEMIA, UNSPECIFIED TYPE: Primary | ICD-10-CM

## 2021-11-04 DIAGNOSIS — C79.51 MALIGNANT NEOPLASM METASTATIC TO BONE (HCC): ICD-10-CM

## 2021-11-04 PROCEDURE — 3017F COLORECTAL CA SCREEN DOC REV: CPT | Performed by: INTERNAL MEDICINE

## 2021-11-04 PROCEDURE — 99214 OFFICE O/P EST MOD 30 MIN: CPT | Performed by: INTERNAL MEDICINE

## 2021-11-04 PROCEDURE — G8427 DOCREV CUR MEDS BY ELIG CLIN: HCPCS | Performed by: INTERNAL MEDICINE

## 2021-11-04 PROCEDURE — G8420 CALC BMI NORM PARAMETERS: HCPCS | Performed by: INTERNAL MEDICINE

## 2021-11-04 PROCEDURE — 99211 OFF/OP EST MAY X REQ PHY/QHP: CPT

## 2021-11-04 PROCEDURE — 1036F TOBACCO NON-USER: CPT | Performed by: INTERNAL MEDICINE

## 2021-11-04 PROCEDURE — G8484 FLU IMMUNIZE NO ADMIN: HCPCS | Performed by: INTERNAL MEDICINE

## 2021-11-04 NOTE — PROGRESS NOTES
MA Rooming Questions  Patient: Brodie Tam  MRN: [id number]    Date: 11/4/2021        1. Do you have any new issues? Yes  - having headaches, itchy skin especially in the groin area. Left foot swollen    2. Do you need any refills on medications?    no    3. Have you had any imaging done since your last visit?   no    4. Have you been hospitalized or seen in the emergency room since your last visit here?   no    5. Did the patient have a depression screening completed today?  No    No data recorded     PHQ-9 Given to (if applicable):               PHQ-9 Score (if applicable):                     [] Positive     []  Negative              Does question #9 need addressed (if applicable)                     [] Yes    []  No               Pam Reyes CMA

## 2021-11-16 ENCOUNTER — HOSPITAL ENCOUNTER (OUTPATIENT)
Dept: INFUSION THERAPY | Age: 56
Discharge: HOME | End: 2021-11-16
Payer: MEDICARE

## 2021-11-16 DIAGNOSIS — C61 MALIGNANT NEOPLASM OF PROSTATE (HCC): ICD-10-CM

## 2021-11-16 DIAGNOSIS — D64.9 ANEMIA, UNSPECIFIED TYPE: Primary | ICD-10-CM

## 2021-11-16 LAB
BASOPHILS ABSOLUTE: 0 K/CU MM
BASOPHILS RELATIVE PERCENT: 0.3 % (ref 0–1)
DIFFERENTIAL TYPE: ABNORMAL
EOSINOPHILS ABSOLUTE: 0.3 K/CU MM
EOSINOPHILS RELATIVE PERCENT: 3.5 % (ref 0–3)
FERRITIN: 359 NG/ML (ref 30–400)
FOLATE: 10.1 NG/ML (ref 3.1–17.5)
HCT VFR BLD CALC: 39.2 % (ref 42–52)
HEMOGLOBIN: 13.2 GM/DL (ref 13.5–18)
IRON: 54 UG/DL (ref 59–158)
LYMPHOCYTES ABSOLUTE: 1.9 K/CU MM
LYMPHOCYTES RELATIVE PERCENT: 23.6 % (ref 24–44)
MCH RBC QN AUTO: 30.3 PG (ref 27–31)
MCHC RBC AUTO-ENTMCNC: 33.7 % (ref 32–36)
MCV RBC AUTO: 89.9 FL (ref 78–100)
MONOCYTES ABSOLUTE: 0.4 K/CU MM
MONOCYTES RELATIVE PERCENT: 5.2 % (ref 0–4)
PCT TRANSFERRIN: 15 % (ref 10–44)
PDW BLD-RTO: 14.1 % (ref 11.7–14.9)
PLATELET # BLD: 201 K/CU MM (ref 140–440)
PMV BLD AUTO: 10.9 FL (ref 7.5–11.1)
RBC # BLD: 4.36 M/CU MM (ref 4.6–6.2)
SEGMENTED NEUTROPHILS ABSOLUTE COUNT: 5.4 K/CU MM
SEGMENTED NEUTROPHILS RELATIVE PERCENT: 67.4 % (ref 36–66)
TOTAL IRON BINDING CAPACITY: 351 UG/DL (ref 250–450)
TSH HIGH SENSITIVITY: 0.88 UIU/ML (ref 0.27–4.2)
UNSATURATED IRON BINDING CAPACITY: 297 UG/DL (ref 110–370)
VITAMIN B-12: 343.9 PG/ML (ref 211–911)
WBC # BLD: 8 K/CU MM (ref 4–10.5)

## 2021-11-16 PROCEDURE — 83540 ASSAY OF IRON: CPT

## 2021-11-16 PROCEDURE — 82607 VITAMIN B-12: CPT

## 2021-11-16 PROCEDURE — 84153 ASSAY OF PSA TOTAL: CPT

## 2021-11-16 PROCEDURE — 82746 ASSAY OF FOLIC ACID SERUM: CPT

## 2021-11-16 PROCEDURE — 84154 ASSAY OF PSA FREE: CPT

## 2021-11-16 PROCEDURE — 85025 COMPLETE CBC W/AUTO DIFF WBC: CPT

## 2021-11-16 PROCEDURE — 83550 IRON BINDING TEST: CPT

## 2021-11-16 PROCEDURE — 82728 ASSAY OF FERRITIN: CPT

## 2021-11-16 PROCEDURE — 96372 THER/PROPH/DIAG INJ SC/IM: CPT

## 2021-11-16 PROCEDURE — 36415 COLL VENOUS BLD VENIPUNCTURE: CPT

## 2021-11-16 PROCEDURE — 6360000002 HC RX W HCPCS: Performed by: INTERNAL MEDICINE

## 2021-11-16 PROCEDURE — 96402 CHEMO HORMON ANTINEOPL SQ/IM: CPT

## 2021-11-16 PROCEDURE — 84443 ASSAY THYROID STIM HORMONE: CPT

## 2021-11-16 RX ADMIN — LEUPROLIDE ACETATE 22.5 MG: KIT SUBCUTANEOUS at 13:52

## 2021-11-16 NOTE — PROGRESS NOTES
Pt. Here for eligard injection. Pt. Stated he continues to have itching, but this is not new. Injection given, pt. Tolerated well. Pt. Assisted to car in wheelchair.

## 2021-11-19 DIAGNOSIS — R60.0 EDEMA OF EXTREMITIES: ICD-10-CM

## 2021-11-19 RX ORDER — TRIAMTERENE AND HYDROCHLOROTHIAZIDE 37.5; 25 MG/1; MG/1
1 TABLET ORAL DAILY PRN
Qty: 20 TABLET | Refills: 2 | Status: SHIPPED | OUTPATIENT
Start: 2021-11-19 | End: 2022-07-28 | Stop reason: SDUPTHER

## 2021-12-13 DIAGNOSIS — C61 MALIGNANT NEOPLASM OF PROSTATE (HCC): ICD-10-CM

## 2021-12-13 RX ORDER — ABIRATERONE ACETATE 250 MG/1
1000 TABLET ORAL DAILY
Qty: 120 TABLET | Refills: 3 | Status: SHIPPED | OUTPATIENT
Start: 2021-12-13 | End: 2022-03-30 | Stop reason: SDUPTHER

## 2021-12-21 ENCOUNTER — VIRTUAL VISIT (OUTPATIENT)
Dept: FAMILY MEDICINE CLINIC | Age: 56
End: 2021-12-21
Payer: MEDICARE

## 2021-12-21 DIAGNOSIS — Z00.00 ROUTINE GENERAL MEDICAL EXAMINATION AT A HEALTH CARE FACILITY: Primary | ICD-10-CM

## 2021-12-21 PROCEDURE — G0438 PPPS, INITIAL VISIT: HCPCS | Performed by: STUDENT IN AN ORGANIZED HEALTH CARE EDUCATION/TRAINING PROGRAM

## 2021-12-21 PROCEDURE — 3017F COLORECTAL CA SCREEN DOC REV: CPT | Performed by: STUDENT IN AN ORGANIZED HEALTH CARE EDUCATION/TRAINING PROGRAM

## 2021-12-21 SDOH — ECONOMIC STABILITY: FOOD INSECURITY: WITHIN THE PAST 12 MONTHS, YOU WORRIED THAT YOUR FOOD WOULD RUN OUT BEFORE YOU GOT MONEY TO BUY MORE.: NEVER TRUE

## 2021-12-21 SDOH — ECONOMIC STABILITY: FOOD INSECURITY: WITHIN THE PAST 12 MONTHS, THE FOOD YOU BOUGHT JUST DIDN'T LAST AND YOU DIDN'T HAVE MONEY TO GET MORE.: NEVER TRUE

## 2021-12-21 ASSESSMENT — LIFESTYLE VARIABLES: HOW OFTEN DO YOU HAVE A DRINK CONTAINING ALCOHOL: 0

## 2021-12-21 ASSESSMENT — PATIENT HEALTH QUESTIONNAIRE - PHQ9
SUM OF ALL RESPONSES TO PHQ QUESTIONS 1-9: 0
SUM OF ALL RESPONSES TO PHQ QUESTIONS 1-9: 0
SUM OF ALL RESPONSES TO PHQ9 QUESTIONS 1 & 2: 0
SUM OF ALL RESPONSES TO PHQ QUESTIONS 1-9: 0
1. LITTLE INTEREST OR PLEASURE IN DOING THINGS: 0
2. FEELING DOWN, DEPRESSED OR HOPELESS: 0

## 2021-12-21 ASSESSMENT — SOCIAL DETERMINANTS OF HEALTH (SDOH): HOW HARD IS IT FOR YOU TO PAY FOR THE VERY BASICS LIKE FOOD, HOUSING, MEDICAL CARE, AND HEATING?: NOT HARD AT ALL

## 2021-12-21 NOTE — PROGRESS NOTES
Medicare Annual Wellness Visit  Name: Elizabeth Wu Date: 2021   MRN: [id number] Sex: Male   Age: 61 y.o. Ethnicity: Non- / Non    : 1965 Race: White (non-)      Colin Fonseca is here for Medicare AWV    Screenings for behavioral, psychosocial and functional/safety risks, and cognitive dysfunction are all negative except as indicated below. These results, as well as other patient data from the [address] form, are documented in Flowsheets linked to this Encounter. Allergies   Allergen Reactions    Bupropion     Prednisone Hives       Prior to Visit Medications    Medication Sig Taking?  Authorizing Provider   abiraterone acetate (ZYTIGA) 250 MG tablet Take 4 tablets by mouth daily Yes Agustin Bustillo MD   triamterene-hydroCHLOROthiazide (MAXZIDE-25) 37.5-25 MG per tablet Take 1 tablet by mouth daily as needed (feet swelling) Yes Brittanie Escobar DO   zoledronic acid (ZOMETA) 4 MG/5ML injection Infuse 4 mg intravenously every 6 months Yes Historical Provider, MD   predniSONE (DELTASONE) 5 MG tablet Take 1 tablet by mouth daily Yes Agustin Bustillo MD   famotidine (PEPCID) 20 MG tablet Take 20 mg by mouth 2 times daily Yes Historical Provider, MD   loratadine (CLARITIN) 10 MG capsule Take 10 mg by mouth daily Yes Historical Provider, MD       Past Medical History:   Diagnosis Date    Mixed hyperlipidemia 10/24/2019       Past Surgical History:   Procedure Laterality Date    FACIAL RECONSTRUCTION SURGERY N/A     NOSE SURGERY N/A        Family History   Problem Relation Age of Onset    Hypertension Mother     Colon Cancer Father     Cancer Father     Hypertension Sister     Hypertension Maternal Grandmother        CareTeam (Including outside providers/suppliers regularly involved in providing care):   Patient Care Team:  Te Sorensen DO as PCP - General (Family Medicine)  Te Sorensen DO as PCP - [facility] Empaneled Provider    Wt Readings from Last 3 Encounters:   07/23/21 168 lb (76.2 kg)   07/21/21 168 lb (76.2 kg)   12/02/20 165 lb (74.8 kg)     There were no vitals filed for this visit. There is no height or weight on file to calculate BMI. Based upon direct observation of the patient, evaluation of cognition reveals recent and remote memory intact. Patient's complete Health Risk Assessment and screening values have been reviewed and are found in Flowsheets. The following problems were reviewed today and where indicated follow up appointments were made and/or referrals ordered. Positive Risk Factor Screenings with Interventions:     Fall Risk:  2 or more falls in past year?: (!) yes  Fall with injury in past year?: no  Fall Risk Interventions:    · Home safety tips provided  · Pt states he has cancer, and is weak. he lives wih family that help him daily. General Health and ACP:  General  In general, how would you say your health is?: Good  In the past 7 days, have you experienced any of the following?  New or Increased Pain, New or Increased Fatigue, Loneliness, Social Isolation, Stress or Anger?: None of These  Do you get the social and emotional support that you need?: Yes  Do you have a Living Will?: Yes  Advance Directives     Power of [address] [place] [address] Will ACP-Advance Directive ACP-Power of     Not on File Not on File Not on File Not on File      General Health Risk Interventions:  · No Living Will: ACP documents already completed- patient asked to provide copy to the office    Health Habits/Nutrition:  Health Habits/Nutrition  Do you exercise for at least 20 minutes 2-3 times per week?: (!) No  Have you lost any weight without trying in the past 3 months?: No  Do you eat only one meal per day?: No  Have you seen the dentist within the past year?: N/A - wear dentures     Health Habits/Nutrition Interventions:  · Inadequate physical activity:  patient is not ready to increase his/her physical activity level at this time    Hearing/Vision:  No exam data present  Hearing/Vision  Do you or your family notice any trouble with your hearing that hasn't been managed with hearing aids?: No  Do you have difficulty driving, watching TV, or doing any of your daily activities because of your eyesight?: No  Have you had an eye exam within the past year?: (!) No  Hearing/Vision Interventions:  · Vision concerns:  patient encouraged to make appointment with his/her eye specialist     ADL:  ADLs  In the past 7 days, did you need help from others to perform any of the following everyday activities? Eating, dressing, grooming, bathing, toileting, or walking/balance?: None  In the past 7 days, did you need help from others to take care of any of the following?  Laundry, housekeeping, banking/finances, shopping, telephone use, food preparation, transportation, or taking medications?: (!) Laundry,Housekeeping,Banking/Finances,Shopping,Food Preparation,Transportation (pt lives with family who helps with ADLS)  ADL Interventions:  · Pt lives with family who help with with ADLS    Personalized Preventive Plan   Current Health Maintenance Status  Immunization History   Administered Date(s) Administered    COVID-19, Louise Peter, PF, 30mcg/0.3mL 07/01/2021, 07/22/2021    Influenza Virus Vaccine 10/18/2010, 10/05/2011, 10/25/2013, 11/13/2015, 11/15/2017, 09/12/2018, 10/01/2019    Influenza, Quadv, IM, PF (6 mo and older Fluzone, Flulaval, Fluarix, and 3 yrs and older Afluria) 10/24/2019, 09/24/2020    Pneumococcal Conjugate 13-valent ([id number]) 11/15/2017    Pneumococcal Polysaccharide ([id number]) 10/05/2011    Tdap (Boostrix, Adacel) 10/24/2019        Health Maintenance   Topic Date Due    Shingles Vaccine (1 of 2) Never done    Pneumococcal 0-64 years Vaccine (3 of 4 - PPSV23) 01/10/2018    Annual Wellness Visit (AWV)  Never done    COVID-19 Vaccine (3 - Pfizer risk 4-dose series) 08/19/2021    Flu vaccine (1) 09/01/2021    Potassium monitoring  10/28/2022    Creatinine monitoring  10/28/2022    PSA counseling  11/16/2022    DTaP/Tdap/Td vaccine (2 - Td or Tdap) 10/24/2029    Hepatitis A vaccine  Aged Out    Hepatitis B vaccine  Aged Out    Hib vaccine  Aged Out    Meningococcal (ACWY) vaccine  Aged Out     Recommendations for [employer] Due: see orders and patient instructions/AVS. Discussed vaccines and screenings with patient. Patient will get when ready. Unable to obtain three vitals signs due to patient not having the equipment for BP and temp. .  Recommended screening schedule for the next 5-10 years is provided to the patient in written form: see Patient Instructions/AVS.    Romario Crowe LPN, 59/19/0548, performed the documented evaluation under the direct supervision of the attending physician. Luis Menjivar, was evaluated through a synchronous (real-time) audio encounter. The patient (or guardian if applicable) is aware that this is a billable service. Verbal consent to proceed has been obtained within the past 12 months. The visit was conducted pursuant to the emergency declaration under the [address] [place] [address], [address] [place] [address] authority and the AMKAI and Endovention General Act. Patient identification was verified, and a caregiver was present when appropriate. The patient was located in the state of [place] where the provider was credentialed to provide care. Total time spent for this encounter: Not billed by time    --Jazmyn Sebastian LPN on [id number]/7655 at 4:02 PM    An electronic signature was used to authenticate this note. This encounter was performed under my, Caitlin Connor, direct supervision, 12/21/2021.

## 2021-12-21 NOTE — PATIENT INSTRUCTIONS
Personalized Preventive Plan for Coco Esparza - 12/21/2021  Medicare offers a range of preventive health benefits. Some of the tests and screenings are paid in full while other may be subject to a deductible, co-insurance, and/or copay. Some of these benefits include a comprehensive review of your medical history including lifestyle, illnesses that may run in your family, and various assessments and screenings as appropriate. After reviewing your medical record and screening and assessments performed today your provider may have ordered immunizations, labs, imaging, and/or referrals for you. A list of these orders (if applicable) as well as your Preventive Care list are included within your After Visit Summary for your review. Other Preventive Recommendations:    · A preventive eye exam performed by an eye specialist is recommended every 1-2 years to screen for glaucoma; cataracts, macular degeneration, and other eye disorders. · A preventive dental visit is recommended every 6 months. · Try to get at least 150 minutes of exercise per week or 10,000 steps per day on a pedometer . · Order or download the FREE \"Exercise & Physical Activity: Your Everyday Guide\" from [employer]. Call 0-302.891.8280 or search [employer] online. · You need 8580-2232 mg of calcium and 8624-7116 IU of vitamin D per day. It is possible to meet your calcium requirement with diet alone, but a vitamin D supplement is usually necessary to meet this goal.  · When exposed to the sun, use a sunscreen that protects against both UVA and UVB radiation with an SPF of 30 or greater. Reapply every 2 to 3 hours or after sweating, drying off with a towel, or swimming. · Always wear a seat belt when traveling in a car. Always wear a helmet when riding a bicycle or motorcycle.

## 2021-12-22 ENCOUNTER — TELEPHONE (OUTPATIENT)
Dept: ONCOLOGY | Age: 56
End: 2021-12-22

## 2021-12-22 NOTE — TELEPHONE ENCOUNTER
Pt is going to [facility] 2/4 900 arrival for a 930 injection and 100 arrival for a 130 scan-- left message for pt with all information

## 2022-01-09 NOTE — PROGRESS NOTES
Patient Name: Fausto Dejesus  Patient : 1965  Patient MRN: [id number]     Primary Oncologist: Beatriz Lockwood MD  Referring Provider: Nitin Barrett DO     Date of Service: 2022      Chief Complaint:   No chief complaint on file. He came in for follow-up visit. Patient Active Problem List:     Muscular dystrophy      Prostate cancer metastatic to bone      Anemia, unspecified     Malignant neoplasm of prostate     Family history of malignant neoplasm of breast     Encounter for nonprocreative genetic counseling     Interstitial pulmonary disease, unspecified     HPI:   Ruchi Doty is a pleasant 69-year-old  male patient was referred for this of the diagnosis of prostate cancer involving the bone, bladder and pelvic lymph node. Initially he presented with gross hematuria in 2019. He went to see family doctor and had an ultrasound of the bladder. 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 Invasive prostatic adenocarcinoma considered less likely. [facility] PSA in 2019 was 29.51.  CT abdomen and pelvis on 2019 showed 3.7 cm intraluminal bladder mass likely representing transitional cell carcinoma rather than extension of the prostate. Chest x-ray showed no acute infiltrative process. On May 14, 2019 he underwent cystoscopy with transurethral resection of the bladder tumor, 2 cm in size and transrectal ultrasound-guided prostate needle biopsy. Pathology report of left prostate biopsy showed adenocarcinoma Stacey score 4+4. Number cores positive out of 12. Proportion of prostatic tissue involved by tumor was 17%. Perineural invasion was seen. Pathology report of urinary bladder biopsy showed prostatic adenocarcinoma.   MRI of pelvis on 2019 showed large mass left peripheral zone extraprostatic extension including left seminal vesicle involvement and left neurovascular bundle involvement,  Multiple  enlarged lymph nodes, left pelvic side lymph node 1.0 x 2.0 cm, left pelvic sidewall lymph node 1.1 x 1.1 cm, right pelvic lymph node 1.5 cm. Bone scan on June 6, 2019 showed skeletal metastatic disease in the lower lumbar spine, sacrum and along the left sacroiliac joint. At present time he denies any specific bone pain. We went over NCCN guideline. Due to the lack of visceral disease and high tumor burden, I offered androgen deprivation therapy. He refused to consider surgical castration. I started him on Casodex prior to androgen deprivation therapy. I scheduled for chemo class. We discussed the potential role of radium therapy if he has more symptoms related to the bone cancer. We discussed the role of provenge/vaccination. He understands well about his prognosis and the goal of the therapy which is palliative. He is agreeable to consider genetic counseling. BMP in April 2019 was unremarkable. He received Eligard injection by urologist.  He started zytiga 7/2019. I reminded him to take prednisone along with zytiga. CT chest in July 2019 showed nonspecific 0.8 cm semisolid noncalcified nodule int RUL. Follow up in 6 months was recommended. PSA in August 2019 was 0.9. Bone scan and CT abdomen pelvis in October 2019 showed stable metastatic disease to the bone. PSA was 0.2. Labs in January 2020 were reviewed. PSA was 0.08. CT chest in January 2020 showed 0.7 cm cavitary lesion. He was found to have B-12 deficiency. He was on B-12 injection. He had lupron injection by urologist in January 2020. CT chest on April 27. 2020 :  1. Stable 7 mm partially cavitary nodule within the right apex. Recommend follow-up CT in 3 months or per clinical protocol. 2. Mild emphysematous changes with no other acute cardiopulmonary findings. PSA in April 2020 was 0.04. Sister has given B-12 injection. CBC is stable.  B-12 improved. CT chest 8/7/2020: Stable 7 mm pulmonary nodule seen within the right upper lobe recommend follow-up CT scan in 1 year's time. PSA was 0.01 in August 2020. He had Lupron injection in September 2020. Spouse has given B12 injection at home. PSA in 12/2/2020 was <0.1. Vitamin B-12 was 443, folate 18.1. CMP was unremarkable. CBC was stable. He had lupron on March 9, 2021. He stopped taking the B12 injection. He had Lupron injection on 2021-06-01. PSA in July 2021 was less than 0.1. CBC and CMP were stable. CT chest on 2021-07-06 showed  Unchanged tiny 7 mm right upper lobe solitary noncalcified pulmonary nodule back to January 2020, very doubtful significance.  No history of smoking in the medical chart.  Any follow-up of the chest at this point should be based on clinical factors. Bone density in July 2021 showed osteoporosis. He is on Zometa every 6 months. I recommended to take vitamin D and calcium. He is agreeable to have follow-up CT chest in July 2022. PSA in October 2021 was less than 0.1. I will continue to monitor PSA every 3 to 6 months. In October 2021 he has mild anemia. Anemic work-up was reviewed. CMP was stable for him. On February 2022 he came in for the Eligard injection. He has been adherent to Zytiga and prednisone. PSA in December 2021 was less than 0.1. I will schedule follow-up CT chest abdomen pelvis in August 2022. He is already scheduled for the bone scan. Advised to call for the test result. He has no new symptoms concerning for progression of disease. No acute pain. Denied any nausea, vomiting or diarrhea. No fever or chills. No chest pain, shortness of breath or palpitation. No headache or dizzy spell. No specific bone pain. No melena or hematochezia. Denied any dysuria or hematuria. He had a Covid vaccine. Past Medical History  Prostate cancer Stacey score 8, Bronson's muscular dystrophy.     Surgical History  Colonoscopy and extirpation of lesion of colon     Social History  He smoked 1 to 2 pack/day for more than 20 years and quit on August 22, 2012. He denies any alcohol or illicit drug use. He has twin sons. Family History  Father: Colorectal cancer  Mother: Breast cancer  Grandfather - Maternal (2nd Degree): Lung cancer  Grandmother - Maternal (2nd Degree): Lung cancer. Current Therapy  Abiraterone + Prednisone + Leuprolide Q3M Cycle Length: 84 Number Cycles: 8 Start: C1D1 on 07/17/2019 Assoc Dx: Prostate cancer LOT: Primary Treatment Stage: JOSE Treatment Intent: [id number]/30/2020 C1 D1  Leuprolide IM (3 month), 22.5 mg  Prednisone Oral, 5 mg  Abiraterone Oral, 1000 mg qday    Review of Systems: \"Per interval history; otherwise 10 point ROS is negative. \"    Vital Signs: There were no vitals taken for this visit. Physical Exam:  CONSTITUTIONAL: awake, alert, cooperative, no apparent distress   EYES: sclera clear and conjunctiva normal  ENT: Normocephalic, without obvious abnormality, atraumatic  NECK: supple, symmetrical.  No JVD or goiter. HEMATOLOGIC/LYMPHATIC: no cervical, supraclavicular or axillary lymphadenopathy   LUNGS: no increased work of breathing and clear to auscultation   CARDIOVASCULAR: regular rate and rhythm, normal S1 and S2, no murmur   ABDOMEN: normal bowel sound, soft, non-distended, non-tender, no masses palpated, no hepatosplenomegaly   MUSCULOSKELETAL: decreased strength to LE. Has been using wheelchair. NEUROLOGIC:  Decreased power due to muscular dystrophy. CN II through XII grossly intact. SKIN: No jaundice. No petechial rash. Warm skin. EXTREMITIES: no LE edema. No cyanosis.      Labs:  Hematology:  Lab Results   Component Value Date    WBC 8.0 11/16/2021    RBC 4.36 (L) 11/16/2021    HGB 13.2 (L) 11/16/2021    HCT 39.2 (L) 11/16/2021    MCV 89.9 11/16/2021    MCH 30.3 11/16/2021    MCHC 33.7 11/16/2021    RDW 14.1 11/16/2021     11/16/2021    MPV 10.9 11/16/2021    BANDSPCT 2 Eligard. He started zytiga and prednisone in July 2019. PSA in August 2019 was 0.9. PSA in October 2019 was 0.2. CT abdomen and pelvis and bone scan in October 2019 showed stable findings. CT chest in January 2020 showed 0.7 cm cavitary lesion. PSA in January 2020 was 0.08. PSA in April 2020 was 0.08. Labs in August 2020 were reviewed. PSA in July 2021 was less than 0.1. He has been adherent to Zytiga and prednisone. He wanted to continue with Lupron every 6 months. PSA in October 2021 was less than 0.1. Clinically he has stable disease. I will check PSA every 3 - 6 months. He will have Eligard injection today. He is scheduled already for the bone scan. He has been adherent to Zytiga and prednisone. 2.  CT chest in July 2019 showed nonspecific 0.8 cm RUL nodule. F/u in 6 months was recommended. CT chest in August 2021 was reviewed. He is agreeable to a follow-up CT chest, abdomen & pelvis in August 2022. .    3. He has Bronson's muscular dystrophy. I referred him to see Genetic counselor- No pathogenic mutations noted. 4. B-12 deficiency. He stopped B-12 injection monthly. Spouse has given B-12 injection. B12 in June 2021 was 429.7. In October 2021 he has mild anemia. Anemic work-up in November 2021 was reviewed. He has anemia of chronic disease. 5.  Bone density test 6/8/2021:. The patient's bone mineral density is in the osteoporosis range.  The fracture risk is high. He is on Zometa every 6 months. I recommend to take vitamin D and calcium. 6.  He has intermittent erythematous rash to the upper extremities. He may call his dermatologist.  He is on Claritin. Discussed about healthy lifestyle and diet. He had Covid vaccine. Return to clinic in 3 months or sooner. All of his question has been answered for today. Recent imaging and labs were reviewed and discussed with the patient.

## 2022-01-11 ENCOUNTER — TELEPHONE (OUTPATIENT)
Dept: INFUSION THERAPY | Age: 57
End: 2022-01-11

## 2022-01-11 NOTE — TELEPHONE ENCOUNTER
Called to ask pt if we can change his tx appt to 1/20 @ 9:45; ok per pt to move; pt voiced understanding.

## 2022-01-18 RX ORDER — SODIUM CHLORIDE 0.9 % (FLUSH) 0.9 %
5-40 SYRINGE (ML) INJECTION PRN
Status: CANCELLED | OUTPATIENT
Start: 2022-01-20

## 2022-01-18 RX ORDER — DIPHENHYDRAMINE HYDROCHLORIDE 50 MG/ML
50 INJECTION INTRAMUSCULAR; INTRAVENOUS ONCE
Status: CANCELLED | OUTPATIENT
Start: 2022-01-20 | End: 2022-01-20

## 2022-01-18 RX ORDER — EPINEPHRINE 1 MG/ML
0.3 INJECTION, SOLUTION, CONCENTRATE INTRAVENOUS PRN
Status: CANCELLED | OUTPATIENT
Start: 2022-01-20

## 2022-01-18 RX ORDER — HEPARIN SODIUM (PORCINE) LOCK FLUSH IV SOLN 100 UNIT/ML 100 UNIT/ML
500 SOLUTION INTRAVENOUS PRN
Status: CANCELLED | OUTPATIENT
Start: 2022-01-20

## 2022-01-18 RX ORDER — SODIUM CHLORIDE 9 MG/ML
INJECTION, SOLUTION INTRAVENOUS CONTINUOUS
Status: CANCELLED | OUTPATIENT
Start: 2022-01-20

## 2022-01-18 RX ORDER — SODIUM CHLORIDE 9 MG/ML
25 INJECTION, SOLUTION INTRAVENOUS PRN
Status: CANCELLED | OUTPATIENT
Start: 2022-01-20

## 2022-01-18 RX ORDER — METHYLPREDNISOLONE SODIUM SUCCINATE 125 MG/2ML
125 INJECTION, POWDER, LYOPHILIZED, FOR SOLUTION INTRAMUSCULAR; INTRAVENOUS ONCE
Status: CANCELLED | OUTPATIENT
Start: 2022-01-20 | End: 2022-01-20

## 2022-01-18 RX ORDER — SODIUM CHLORIDE 9 MG/ML
20 INJECTION, SOLUTION INTRAVENOUS ONCE
Status: CANCELLED | OUTPATIENT
Start: 2022-01-20 | End: 2022-01-20

## 2022-01-20 ENCOUNTER — HOSPITAL ENCOUNTER (OUTPATIENT)
Dept: INFUSION THERAPY | Age: 57
Discharge: HOME | End: 2022-01-20
Payer: MEDICARE

## 2022-01-20 VITALS
DIASTOLIC BLOOD PRESSURE: 61 MMHG | SYSTOLIC BLOOD PRESSURE: 123 MMHG | BODY MASS INDEX: 24.81 KG/M2 | TEMPERATURE: 96.6 F | HEIGHT: 69 IN | RESPIRATION RATE: 16 BRPM | HEART RATE: 76 BPM | OXYGEN SATURATION: 99 %

## 2022-01-20 DIAGNOSIS — C61 MALIGNANT NEOPLASM OF PROSTATE (HCC): Primary | ICD-10-CM

## 2022-01-20 DIAGNOSIS — C61 PROSTATE CANCER METASTATIC TO BONE (HCC): ICD-10-CM

## 2022-01-20 DIAGNOSIS — C79.51 PROSTATE CANCER METASTATIC TO BONE (HCC): ICD-10-CM

## 2022-01-20 LAB
ALBUMIN SERPL-MCNC: 4.8 GM/DL (ref 3.4–5)
ALP BLD-CCNC: 68 IU/L (ref 40–128)
ALT SERPL-CCNC: 23 U/L (ref 10–40)
ANION GAP SERPL CALCULATED.3IONS-SCNC: 14 MMOL/L (ref 4–16)
AST SERPL-CCNC: 21 IU/L (ref 15–37)
BASOPHILS ABSOLUTE: 0 K/CU MM
BASOPHILS RELATIVE PERCENT: 0.1 % (ref 0–1)
BILIRUB SERPL-MCNC: 0.4 MG/DL (ref 0–1)
BUN BLDV-MCNC: 13 MG/DL (ref 6–23)
CALCIUM SERPL-MCNC: 9.6 MG/DL (ref 8.3–10.6)
CHLORIDE BLD-SCNC: 102 MMOL/L (ref 99–110)
CO2: 25 MMOL/L (ref 21–32)
CREAT SERPL-MCNC: 0.2 MG/DL (ref 0.9–1.3)
DIFFERENTIAL TYPE: ABNORMAL
EOSINOPHILS ABSOLUTE: 0.2 K/CU MM
EOSINOPHILS RELATIVE PERCENT: 2.1 % (ref 0–3)
GFR AFRICAN AMERICAN: >60 ML/MIN/1.73M2
GFR AFRICAN AMERICAN: >60 ML/MIN/1.73M2
GFR NON-AFRICAN AMERICAN: >60 ML/MIN/1.73M2
GFR NON-AFRICAN AMERICAN: >60 ML/MIN/1.73M2
GLUCOSE BLD-MCNC: 100 MG/DL (ref 70–99)
GLUCOSE BLD-MCNC: 101 MG/DL (ref 70–99)
HCT VFR BLD CALC: 40.1 % (ref 42–52)
HEMOGLOBIN: 13.2 GM/DL (ref 13.5–18)
LYMPHOCYTES ABSOLUTE: 1.2 K/CU MM
LYMPHOCYTES RELATIVE PERCENT: 15.7 % (ref 24–44)
MCH RBC QN AUTO: 29.9 PG (ref 27–31)
MCHC RBC AUTO-ENTMCNC: 32.9 % (ref 32–36)
MCV RBC AUTO: 90.7 FL (ref 78–100)
MONOCYTES ABSOLUTE: 0.5 K/CU MM
MONOCYTES RELATIVE PERCENT: 6.4 % (ref 0–4)
PDW BLD-RTO: 13.7 % (ref 11.7–14.9)
PLATELET # BLD: 205 K/CU MM (ref 140–440)
PMV BLD AUTO: 11.1 FL (ref 7.5–11.1)
POC BUN: 11 MG/DL (ref 8–26)
POC CALCIUM: 1.15 MMOL/L (ref 1.12–1.32)
POC CHLORIDE: 104 MMOL/L (ref 98–109)
POC CO2: 25 MMOL/L (ref 21–32)
POC CREATININE: 0.4 MG/DL (ref 0.9–1.3)
POTASSIUM SERPL-SCNC: 4.2 MMOL/L (ref 3.5–4.5)
POTASSIUM SERPL-SCNC: 4.6 MMOL/L (ref 3.5–5.1)
RBC # BLD: 4.42 M/CU MM (ref 4.6–6.2)
SEGMENTED NEUTROPHILS ABSOLUTE COUNT: 5.5 K/CU MM
SEGMENTED NEUTROPHILS RELATIVE PERCENT: 75.7 % (ref 36–66)
SODIUM BLD-SCNC: 140 MMOL/L (ref 138–146)
SODIUM BLD-SCNC: 141 MMOL/L (ref 135–145)
SOURCE, BLOOD GAS: ABNORMAL
TOTAL PROTEIN: 7.2 GM/DL (ref 6.4–8.2)
WBC # BLD: 7.3 K/CU MM (ref 4–10.5)

## 2022-01-20 PROCEDURE — 6360000002 HC RX W HCPCS: Performed by: INTERNAL MEDICINE

## 2022-01-20 PROCEDURE — 85025 COMPLETE CBC W/AUTO DIFF WBC: CPT

## 2022-01-20 PROCEDURE — 80053 COMPREHEN METABOLIC PANEL: CPT

## 2022-01-20 PROCEDURE — 96365 THER/PROPH/DIAG IV INF INIT: CPT

## 2022-01-20 PROCEDURE — 2580000003 HC RX 258: Performed by: INTERNAL MEDICINE

## 2022-01-20 RX ORDER — OYSTER SHELL CALCIUM WITH VITAMIN D 500; 200 MG/1; [IU]/1
2 TABLET, FILM COATED ORAL EVERY OTHER DAY
COMMUNITY

## 2022-01-20 RX ADMIN — ZOLEDRONIC ACID 4 MG: 4 INJECTION INTRAVENOUS at 10:30

## 2022-01-20 NOTE — PROGRESS NOTES
Assisted to infusion suite, here today Zometa infusion. No concerns at this time. PIV placed in R hands, positive blood return noted. Labs drawn. Labs within defined limits. Chemo administered as ordered. Call light within reach. Tolerated infusion without incident. AVS declined. Patient RTC 01/28 for lab draw.

## 2022-01-27 ENCOUNTER — OFFICE VISIT (OUTPATIENT)
Dept: FAMILY MEDICINE CLINIC | Age: 57
End: 2022-01-27
Payer: MEDICARE

## 2022-01-27 VITALS
SYSTOLIC BLOOD PRESSURE: 126 MMHG | HEART RATE: 80 BPM | DIASTOLIC BLOOD PRESSURE: 74 MMHG | BODY MASS INDEX: 24.81 KG/M2 | OXYGEN SATURATION: 99 % | HEIGHT: 69 IN

## 2022-01-27 DIAGNOSIS — G71.01 BECKER'S MUSCULAR DYSTROPHY (HCC): ICD-10-CM

## 2022-01-27 DIAGNOSIS — C61 MALIGNANT NEOPLASM OF PROSTATE (HCC): Primary | ICD-10-CM

## 2022-01-27 DIAGNOSIS — J84.9 INTERSTITIAL PULMONARY DISEASE, UNSPECIFIED (HCC): ICD-10-CM

## 2022-01-27 DIAGNOSIS — Z80.3 FAMILY HISTORY OF MALIGNANT NEOPLASM OF BREAST: ICD-10-CM

## 2022-01-27 DIAGNOSIS — C79.51 MALIGNANT NEOPLASM METASTATIC TO BONE (HCC): ICD-10-CM

## 2022-01-27 DIAGNOSIS — R60.0 EDEMA OF EXTREMITIES: ICD-10-CM

## 2022-01-27 DIAGNOSIS — Z13.220 LIPID SCREENING: ICD-10-CM

## 2022-01-27 PROCEDURE — 3017F COLORECTAL CA SCREEN DOC REV: CPT | Performed by: STUDENT IN AN ORGANIZED HEALTH CARE EDUCATION/TRAINING PROGRAM

## 2022-01-27 PROCEDURE — G8484 FLU IMMUNIZE NO ADMIN: HCPCS | Performed by: STUDENT IN AN ORGANIZED HEALTH CARE EDUCATION/TRAINING PROGRAM

## 2022-01-27 PROCEDURE — 1036F TOBACCO NON-USER: CPT | Performed by: STUDENT IN AN ORGANIZED HEALTH CARE EDUCATION/TRAINING PROGRAM

## 2022-01-27 PROCEDURE — G8427 DOCREV CUR MEDS BY ELIG CLIN: HCPCS | Performed by: STUDENT IN AN ORGANIZED HEALTH CARE EDUCATION/TRAINING PROGRAM

## 2022-01-27 PROCEDURE — G8420 CALC BMI NORM PARAMETERS: HCPCS | Performed by: STUDENT IN AN ORGANIZED HEALTH CARE EDUCATION/TRAINING PROGRAM

## 2022-01-27 PROCEDURE — 99214 OFFICE O/P EST MOD 30 MIN: CPT | Performed by: STUDENT IN AN ORGANIZED HEALTH CARE EDUCATION/TRAINING PROGRAM

## 2022-01-27 ASSESSMENT — ENCOUNTER SYMPTOMS
SHORTNESS OF BREATH: 0
WHEEZING: 0
NAUSEA: 0
ABDOMINAL PAIN: 0
SORE THROAT: 0

## 2022-01-27 NOTE — PROGRESS NOTES
2/3/2022    Parul Eid    Chief Complaint   Patient presents with    6 Month Follow-Up     pt reports no concerns       HPI  History was obtained from patient. Stephen Maya is a 61 y.o. male with a PMHx as listed below who presents today for 6 month follow up on chronic conditions. Currently on Zytiga hx. Prostate ca. Found to have Osteoporosis. Now on Zoledronic acid. Patient was referred to genetic counselor, workup pending fm. Hx. Breast ca. Currently in wheelchair but baseline is able to ambulate minimally with assistance. Tolerating diet, no choking    Patient now noticing at times some chest tightness, this is rare. No hx. Of asthma. Hx. Chronic rash on hydrocortisone PRN. 1. Malignant neoplasm of prostate ([address]    2. Edema of extremities    3. Malignant neoplasm metastatic to bone ([address]    4. Bronson's muscular dystrophy ([address]    5. Interstitial pulmonary disease, unspecified ([address]    6. Lipid screening    7. Family history of malignant neoplasm of breast             REVIEW OF SYMPTOMS    Review of Systems   Constitutional: Negative for chills and fatigue. HENT: Negative for congestion and sore throat. Respiratory: Negative for shortness of breath and wheezing. Cardiovascular: Negative for chest pain and palpitations. Gastrointestinal: Negative for abdominal pain and nausea. Genitourinary: Negative for frequency and urgency. Neurological: Negative for light-headedness.        PAST MEDICAL HISTORY  Past Medical History:   Diagnosis Date    Mixed hyperlipidemia 10/24/2019       FAMILY HISTORY  Family History   Problem Relation Age of Onset    Hypertension Mother     Colon Cancer Father     Cancer Father     Hypertension Sister     Hypertension Maternal Grandmother        SOCIAL HISTORY  Social History     Socioeconomic History    Marital status: Single     Spouse name: None    Number of children: None    Years of education: None    Highest education level: None Occupational History    None   Tobacco Use    Smoking status: Never Smoker    Smokeless tobacco: Never Used   Vaping Use    Vaping Use: Never used   Substance and Sexual Activity    Alcohol use: Not Currently    Drug use: None    Sexual activity: None   Other Topics Concern    None   Social History Narrative    None     Social Determinants of Health     Financial Resource Strain: Low Risk     Difficulty of Paying Living Expenses: Not hard at all   Food Insecurity: No Food Insecurity    Worried About Running Out of Food in the Last Year: Never true    Hollie of Food in the Last Year: Never true   Transportation Needs:     Lack of Transportation (Medical): Not on file    Lack of Transportation (Non-Medical):  Not on file   Physical Activity:     Days of Exercise per Week: Not on file    Minutes of Exercise per Session: Not on file   Stress:     Feeling of Stress : Not on file   Social Connections:     Frequency of Communication with Friends and Family: Not on file    Frequency of Social Gatherings with Friends and Family: Not on file    Attends Faith Services: Not on file    Active Member of [address] [place] [address] or Organizations: Not on file    Attends Club or Organization Meetings: Not on file    Marital Status: Not on file   Intimate Partner Violence:     Fear of Current or Ex-Partner: Not on file    Emotionally Abused: Not on file    Physically Abused: Not on file    Sexually Abused: Not on file   Housing Stability:     Unable to Pay for Housing in the Last Year: Not on file    Number of [place] in the Last Year: Not on file    Unstable Housing in the Last Year: Not on file        SURGICAL HISTORY  Past Surgical History:   Procedure Laterality Date    FACIAL RECONSTRUCTION SURGERY N/A 1986    NOSE SURGERY N/A 1986                 CURRENT MEDICATIONS  Current Outpatient Medications   Medication Sig Dispense Refill    calcium-vitamin D (OSCAL-500) 500-200 MG-UNIT per tablet Take 2 tablets by mouth daily      abiraterone acetate (ZYTIGA) 250 MG tablet Take 4 tablets by mouth daily 120 tablet 3    triamterene-hydroCHLOROthiazide (MAXZIDE-25) 37.5-25 MG per tablet Take 1 tablet by mouth daily as needed (feet swelling) 20 tablet 2    zoledronic acid (ZOMETA) 4 MG/5ML injection Infuse 4 mg intravenously every 6 months      predniSONE (DELTASONE) 5 MG tablet Take 1 tablet by mouth daily 30 tablet 12    famotidine (PEPCID) 20 MG tablet Take 20 mg by mouth 2 times daily      loratadine (CLARITIN) 10 MG capsule Take 10 mg by mouth daily       No current facility-administered medications for this visit. ALLERGIES  Allergies   Allergen Reactions    Bupropion     Prednisone Hives       PHYSICAL EXAM    /74   Pulse 80   Ht 5' 9\" (1.753 m)   SpO2 99%   BMI 24.81 kg/m²     Physical Exam  Constitutional:       Appearance: Normal appearance. HENT:      Head: Normocephalic and atraumatic. Eyes:      Extraocular Movements: Extraocular movements intact. Pupils: Pupils are equal, round, and reactive to light. Cardiovascular:      Rate and Rhythm: Normal rate and regular rhythm. Pulses: Normal pulses. Heart sounds: No murmur heard. No friction rub. No gallop. Pulmonary:      Effort: Pulmonary effort is normal.      Breath sounds: Normal breath sounds. Musculoskeletal:      Cervical back: Neck supple. Skin:     General: Skin is warm and dry. Neurological:      General: No focal deficit present. Mental Status: He is alert. Psychiatric:         Mood and Affect: Mood normal.         Behavior: Behavior normal.         ASSESSMENT & PLAN    1. Edema of extremities  Stable with diuretics PRN    2. Malignant neoplasm of prostate ([address]  -following with Heme/onc on zytiga    3. Malignant neoplasm metastatic to bone ([address]    4. Bronson's muscular dystrophy ([address]  -in wheelchair at baseline    5.  Interstitial pulmonary disease, unspecified ([address]  -offered albuterol declines,

## 2022-01-28 ENCOUNTER — HOSPITAL ENCOUNTER (OUTPATIENT)
Dept: INFUSION THERAPY | Age: 57
Discharge: HOME | End: 2022-01-28
Payer: MEDICARE

## 2022-01-28 ENCOUNTER — HOSPITAL ENCOUNTER (OUTPATIENT)
Age: 57
Setting detail: SPECIMEN
Discharge: HOME | End: 2022-01-28
Payer: MEDICARE

## 2022-01-28 DIAGNOSIS — C61 MALIGNANT NEOPLASM OF PROSTATE (HCC): ICD-10-CM

## 2022-01-28 LAB
ALBUMIN SERPL-MCNC: 3.9 GM/DL (ref 3.4–5)
ALP BLD-CCNC: 79 IU/L (ref 40–129)
ALT SERPL-CCNC: 23 U/L (ref 10–40)
ANION GAP SERPL CALCULATED.3IONS-SCNC: 12 MMOL/L (ref 4–16)
AST SERPL-CCNC: 22 IU/L (ref 15–37)
BASOPHILS ABSOLUTE: 0 K/CU MM
BASOPHILS RELATIVE PERCENT: 0.3 % (ref 0–1)
BILIRUB SERPL-MCNC: 0.3 MG/DL (ref 0–1)
BUN BLDV-MCNC: 15 MG/DL (ref 6–23)
CALCIUM SERPL-MCNC: 8.8 MG/DL (ref 8.3–10.6)
CHLORIDE BLD-SCNC: 99 MMOL/L (ref 99–110)
CHOLESTEROL: 228 MG/DL
CO2: 24 MMOL/L (ref 21–32)
CREAT SERPL-MCNC: 0.2 MG/DL (ref 0.9–1.3)
DIFFERENTIAL TYPE: ABNORMAL
EOSINOPHILS ABSOLUTE: 0.2 K/CU MM
EOSINOPHILS RELATIVE PERCENT: 2.6 % (ref 0–3)
GFR AFRICAN AMERICAN: >60 ML/MIN/1.73M2
GFR NON-AFRICAN AMERICAN: >60 ML/MIN/1.73M2
GLUCOSE BLD-MCNC: 109 MG/DL (ref 70–99)
HCT VFR BLD CALC: 38.6 % (ref 42–52)
HDLC SERPL-MCNC: 41 MG/DL
HEMOGLOBIN: 12.7 GM/DL (ref 13.5–18)
LDL CHOLESTEROL DIRECT: 145 MG/DL
LYMPHOCYTES ABSOLUTE: 1.4 K/CU MM
LYMPHOCYTES RELATIVE PERCENT: 18.6 % (ref 24–44)
MCH RBC QN AUTO: 30 PG (ref 27–31)
MCHC RBC AUTO-ENTMCNC: 32.9 % (ref 32–36)
MCV RBC AUTO: 91 FL (ref 78–100)
MONOCYTES ABSOLUTE: 0.5 K/CU MM
MONOCYTES RELATIVE PERCENT: 6.2 % (ref 0–4)
PDW BLD-RTO: 13.6 % (ref 11.7–14.9)
PLATELET # BLD: 198 K/CU MM (ref 140–440)
PMV BLD AUTO: 11 FL (ref 7.5–11.1)
POTASSIUM SERPL-SCNC: 4.2 MMOL/L (ref 3.5–5.1)
RBC # BLD: 4.24 M/CU MM (ref 4.6–6.2)
SEGMENTED NEUTROPHILS ABSOLUTE COUNT: 5.3 K/CU MM
SEGMENTED NEUTROPHILS RELATIVE PERCENT: 72.3 % (ref 36–66)
SODIUM BLD-SCNC: 135 MMOL/L (ref 135–145)
TOTAL PROTEIN: 6.8 GM/DL (ref 6.4–8.2)
TRIGL SERPL-MCNC: 204 MG/DL
WBC # BLD: 7.3 K/CU MM (ref 4–10.5)

## 2022-01-28 PROCEDURE — 84153 ASSAY OF PSA TOTAL: CPT

## 2022-01-28 PROCEDURE — 80053 COMPREHEN METABOLIC PANEL: CPT

## 2022-01-28 PROCEDURE — 36415 COLL VENOUS BLD VENIPUNCTURE: CPT

## 2022-01-28 PROCEDURE — 80061 LIPID PANEL: CPT

## 2022-01-28 PROCEDURE — 84154 ASSAY OF PSA FREE: CPT

## 2022-01-28 PROCEDURE — 85025 COMPLETE CBC W/AUTO DIFF WBC: CPT

## 2022-02-01 NOTE — RESULT ENCOUNTER NOTE
Please discuss with patient cholesterol is mildly elevated (ASCVD 8.1%) I would recommend trial of diet and exerise first as most cholesterol drugs may have a side effect of m. Aches.

## 2022-02-02 ENCOUNTER — TELEPHONE (OUTPATIENT)
Dept: INFUSION THERAPY | Age: 57
End: 2022-02-02

## 2022-02-02 NOTE — TELEPHONE ENCOUNTER
RIP for pt as he's asking to move his ov + injection appt on 2/8; informed pt we have openings on 2/11 if this works for him. Asked pt to return my call to schedule this date.     Pt returned my call & said we misunderstood as he does not want to reschedule his visits he just wanted Dr. Prateek Mtz to be aware he rescheduled his scan due to the weather but said he still has things to discuss with him at the visit on 2/8

## 2022-02-08 ENCOUNTER — OFFICE VISIT (OUTPATIENT)
Dept: ONCOLOGY | Age: 57
End: 2022-02-08
Payer: MEDICARE

## 2022-02-08 ENCOUNTER — HOSPITAL ENCOUNTER (OUTPATIENT)
Dept: INFUSION THERAPY | Age: 57
Discharge: HOME | End: 2022-02-08
Payer: MEDICARE

## 2022-02-08 VITALS
DIASTOLIC BLOOD PRESSURE: 67 MMHG | RESPIRATION RATE: 16 BRPM | WEIGHT: 167 LBS | BODY MASS INDEX: 24.73 KG/M2 | HEART RATE: 75 BPM | HEIGHT: 69 IN | SYSTOLIC BLOOD PRESSURE: 128 MMHG | TEMPERATURE: 97.6 F | OXYGEN SATURATION: 98 %

## 2022-02-08 DIAGNOSIS — C79.51 PROSTATE CANCER METASTATIC TO BONE (HCC): Primary | ICD-10-CM

## 2022-02-08 DIAGNOSIS — C61 PROSTATE CANCER METASTATIC TO BONE (HCC): Primary | ICD-10-CM

## 2022-02-08 DIAGNOSIS — C61 MALIGNANT NEOPLASM OF PROSTATE (HCC): Primary | ICD-10-CM

## 2022-02-08 DIAGNOSIS — R91.1 LUNG NODULE: ICD-10-CM

## 2022-02-08 PROCEDURE — G8420 CALC BMI NORM PARAMETERS: HCPCS | Performed by: INTERNAL MEDICINE

## 2022-02-08 PROCEDURE — 96372 THER/PROPH/DIAG INJ SC/IM: CPT

## 2022-02-08 PROCEDURE — 3017F COLORECTAL CA SCREEN DOC REV: CPT | Performed by: INTERNAL MEDICINE

## 2022-02-08 PROCEDURE — 1036F TOBACCO NON-USER: CPT | Performed by: INTERNAL MEDICINE

## 2022-02-08 PROCEDURE — 6360000002 HC RX W HCPCS: Performed by: INTERNAL MEDICINE

## 2022-02-08 PROCEDURE — 96402 CHEMO HORMON ANTINEOPL SQ/IM: CPT

## 2022-02-08 PROCEDURE — 99213 OFFICE O/P EST LOW 20 MIN: CPT | Performed by: INTERNAL MEDICINE

## 2022-02-08 PROCEDURE — G8484 FLU IMMUNIZE NO ADMIN: HCPCS | Performed by: INTERNAL MEDICINE

## 2022-02-08 PROCEDURE — G8427 DOCREV CUR MEDS BY ELIG CLIN: HCPCS | Performed by: INTERNAL MEDICINE

## 2022-02-08 RX ADMIN — LEUPROLIDE ACETATE 22.5 MG: KIT SUBCUTANEOUS at 12:28

## 2022-02-08 ASSESSMENT — PATIENT HEALTH QUESTIONNAIRE - PHQ9
SUM OF ALL RESPONSES TO PHQ QUESTIONS 1-9: 0
SUM OF ALL RESPONSES TO PHQ QUESTIONS 1-9: 0
SUM OF ALL RESPONSES TO PHQ9 QUESTIONS 1 & 2: 0
SUM OF ALL RESPONSES TO PHQ QUESTIONS 1-9: 0
2. FEELING DOWN, DEPRESSED OR HOPELESS: 0
1. LITTLE INTEREST OR PLEASURE IN DOING THINGS: 0
SUM OF ALL RESPONSES TO PHQ QUESTIONS 1-9: 0

## 2022-02-08 NOTE — PROGRESS NOTES
MA Rooming Questions  Patient: Yashira Cruz  MRN: [id number]    Date: 2/8/2022        1. Do you have any new issues?   no         2. Do you need any refills on medications?    no    3. Have you had any imaging done since your last visit?   no    4. Have you been hospitalized or seen in the emergency room since your last visit here?   no    5. Did the patient have a depression screening completed today?  Yes    PHQ-9 Total Score: 0 (2/8/2022 11:32 AM)       PHQ-9 Given to (if applicable):               PHQ-9 Score (if applicable):                     [] Positive     []  Negative              Does question #9 need addressed (if applicable)                     [] Yes    []  No               Wayne Sanchez CMA

## 2022-02-09 ENCOUNTER — HOSPITAL ENCOUNTER (OUTPATIENT)
Dept: NUCLEAR MEDICINE | Age: 57
Discharge: HOME | End: 2022-02-09
Payer: MEDICARE

## 2022-02-09 DIAGNOSIS — C79.51 MALIGNANT NEOPLASM METASTATIC TO BONE (HCC): ICD-10-CM

## 2022-02-09 PROCEDURE — A9503 TC99M MEDRONATE: HCPCS | Performed by: INTERNAL MEDICINE

## 2022-02-09 PROCEDURE — 78306 BONE IMAGING WHOLE BODY: CPT

## 2022-02-09 PROCEDURE — 3430000000 HC RX DIAGNOSTIC RADIOPHARMACEUTICAL: Performed by: INTERNAL MEDICINE

## 2022-02-09 RX ORDER — TC 99M MEDRONATE 20 MG/10ML
25 INJECTION, POWDER, LYOPHILIZED, FOR SOLUTION INTRAVENOUS
Status: COMPLETED | OUTPATIENT
Start: 2022-02-09 | End: 2022-02-09

## 2022-02-09 RX ADMIN — TC 99M MEDRONATE 25 MILLICURIE: 20 INJECTION, POWDER, LYOPHILIZED, FOR SOLUTION INTRAVENOUS at 09:10

## 2022-03-04 ENCOUNTER — OFFICE VISIT (OUTPATIENT)
Dept: FAMILY MEDICINE CLINIC | Age: 57
End: 2022-03-04
Payer: MEDICARE

## 2022-03-04 VITALS
SYSTOLIC BLOOD PRESSURE: 118 MMHG | DIASTOLIC BLOOD PRESSURE: 74 MMHG | BODY MASS INDEX: 24.66 KG/M2 | HEART RATE: 77 BPM | OXYGEN SATURATION: 96 % | HEIGHT: 69 IN

## 2022-03-04 DIAGNOSIS — L50.9 HIVES: ICD-10-CM

## 2022-03-04 DIAGNOSIS — G71.01 BECKER'S MUSCULAR DYSTROPHY (HCC): Primary | ICD-10-CM

## 2022-03-04 PROCEDURE — 99213 OFFICE O/P EST LOW 20 MIN: CPT | Performed by: STUDENT IN AN ORGANIZED HEALTH CARE EDUCATION/TRAINING PROGRAM

## 2022-03-04 PROCEDURE — G8484 FLU IMMUNIZE NO ADMIN: HCPCS | Performed by: STUDENT IN AN ORGANIZED HEALTH CARE EDUCATION/TRAINING PROGRAM

## 2022-03-04 PROCEDURE — G8420 CALC BMI NORM PARAMETERS: HCPCS | Performed by: STUDENT IN AN ORGANIZED HEALTH CARE EDUCATION/TRAINING PROGRAM

## 2022-03-04 PROCEDURE — 3017F COLORECTAL CA SCREEN DOC REV: CPT | Performed by: STUDENT IN AN ORGANIZED HEALTH CARE EDUCATION/TRAINING PROGRAM

## 2022-03-04 PROCEDURE — 1036F TOBACCO NON-USER: CPT | Performed by: STUDENT IN AN ORGANIZED HEALTH CARE EDUCATION/TRAINING PROGRAM

## 2022-03-04 PROCEDURE — G8427 DOCREV CUR MEDS BY ELIG CLIN: HCPCS | Performed by: STUDENT IN AN ORGANIZED HEALTH CARE EDUCATION/TRAINING PROGRAM

## 2022-03-04 ASSESSMENT — ENCOUNTER SYMPTOMS
SORE THROAT: 0
NAUSEA: 0
SHORTNESS OF BREATH: 0
WHEEZING: 0
ABDOMINAL PAIN: 0

## 2022-03-04 NOTE — PROGRESS NOTES
3/23/2022    Clement Dias    Chief Complaint   Patient presents with    Other     discuss long term disability       HPI  History was obtained from patient. Reba Monroy is a 61 y.o. male with a PMHx as listed below who presents today for discussion disability. Recall patient has hx. Of [place] m. Dystrophy overall m. Weakness fairly stable  Tolerating meals/swallowing. No CP, SOB. Following Urology Hx. Prostate ca. Appt. [facility] urology Dr. Ghanshyam Stewart     Patient stopped pepcid (sternal pain) And claritin (rash)    Noted to have some peripheral edema he thinks was related to zytiga    1. Bronson's muscular dystrophy ([address]    2. Hives             REVIEW OF SYMPTOMS    Review of Systems   Constitutional: Negative for chills and fatigue. HENT: Negative for congestion and sore throat. Respiratory: Negative for shortness of breath and wheezing. Cardiovascular: Negative for chest pain and palpitations. Gastrointestinal: Negative for abdominal pain and nausea. Genitourinary: Negative for frequency and urgency. Neurological: Negative for light-headedness.        PAST MEDICAL HISTORY  Past Medical History:   Diagnosis Date    Mixed hyperlipidemia 10/24/2019       FAMILY HISTORY  Family History   Problem Relation Age of Onset    Hypertension Mother     Colon Cancer Father     Cancer Father     Hypertension Sister     Hypertension Maternal Grandmother        SOCIAL HISTORY  Social History     Socioeconomic History    Marital status: Single     Spouse name: None    Number of children: None    Years of education: None    Highest education level: None   Occupational History    None   Tobacco Use    Smoking status: Never Smoker    Smokeless tobacco: Never Used   Vaping Use    Vaping Use: Never used   Substance and Sexual Activity    Alcohol use: Not Currently    Drug use: None    Sexual activity: None   Other Topics Concern    None   Social History Narrative    None     Social Determinants of Health Financial Resource Strain: Low Risk     Difficulty of Paying Living Expenses: Not hard at all   Food Insecurity: No Food Insecurity    Worried About Running Out of Food in the Last Year: Never true    Hollie of Food in the Last Year: Never true   Transportation Needs:     Lack of Transportation (Medical): Not on file    Lack of Transportation (Non-Medical):  Not on file   Physical Activity:     Days of Exercise per Week: Not on file    Minutes of Exercise per Session: Not on file   Stress:     Feeling of Stress : Not on file   Social Connections:     Frequency of Communication with Friends and Family: Not on file    Frequency of Social Gatherings with Friends and Family: Not on file    Attends Yarsanism Services: Not on file    Active Member of [address] [place] [address] [address] [employer] or Organizations: Not on file    Attends Club or Organization Meetings: Not on file    Marital Status: Not on file   Intimate Partner Violence:     Fear of Current or Ex-Partner: Not on file    Emotionally Abused: Not on file    Physically Abused: Not on file    Sexually Abused: Not on file   Housing Stability:     Unable to Pay for Housing in the Last Year: Not on file    Number of [place] in the Last Year: Not on file    Unstable Housing in the Last Year: Not on file        SURGICAL HISTORY  Past Surgical History:   Procedure Laterality Date    FACIAL RECONSTRUCTION SURGERY N/A 1986    NOSE SURGERY N/A 1986                 CURRENT MEDICATIONS  Current Outpatient Medications   Medication Sig Dispense Refill    abiraterone acetate (ZYTIGA) 250 MG tablet Take 4 tablets by mouth daily 120 tablet 3    triamterene-hydroCHLOROthiazide (MAXZIDE-25) 37.5-25 MG per tablet Take 1 tablet by mouth daily as needed (feet swelling) 20 tablet 2    zoledronic acid (ZOMETA) 4 MG/5ML injection Infuse 4 mg intravenously every 6 months      predniSONE (DELTASONE) 5 MG tablet Take 1 tablet by mouth daily 30 tablet 12    loratadine (CLARITIN) 10 MG capsule Take 10 mg by mouth daily      calcium-vitamin D (OSCAL-500) 500-200 MG-UNIT per tablet Take 2 tablets by mouth daily (Patient not taking: Reported on 3/4/2022)      famotidine (PEPCID) 20 MG tablet Take 20 mg by mouth 2 times daily (Patient not taking: Reported on 3/4/2022)       No current facility-administered medications for this visit. ALLERGIES  Allergies   Allergen Reactions    Bupropion     Prednisone Hives       PHYSICAL EXAM    /74 (Site: Left Upper Arm, Position: Sitting, Cuff Size: Medium Adult)   Pulse 77   Ht 5' 9\" (1.753 m)   SpO2 96%   BMI 24.66 kg/m²     Physical Exam  Constitutional:       Appearance: Normal appearance. HENT:      Head: Normocephalic and atraumatic. Eyes:      Extraocular Movements: Extraocular movements intact. Pupils: Pupils are equal, round, and reactive to light. Cardiovascular:      Rate and Rhythm: Normal rate and regular rhythm. Pulses: Normal pulses. Heart sounds: No murmur heard. No friction rub. No gallop. Pulmonary:      Effort: Pulmonary effort is normal.      Breath sounds: Normal breath sounds. Skin:     General: Skin is warm and dry. Neurological:      General: No focal deficit present. Mental Status: He is alert. Psychiatric:         Mood and Affect: Mood normal.         Behavior: Behavior normal.         ASSESSMENT & PLAN    1. Bronson's muscular dystrophy ([facility]  -stable    2. Hives  -patient believes due to medications he will discuss with heme/onc    Swelling patient considers 2/2 zytiga consider lasix/ further workup declines at this time. No follow-ups on file.          Electronically signed by Mathew Lepe DO on 3/23/2022

## 2022-03-30 ENCOUNTER — CLINICAL DOCUMENTATION (OUTPATIENT)
Dept: ONCOLOGY | Age: 57
End: 2022-03-30

## 2022-03-30 DIAGNOSIS — C61 MALIGNANT NEOPLASM OF PROSTATE (HCC): Primary | ICD-10-CM

## 2022-03-30 RX ORDER — ABIRATERONE ACETATE 250 MG/1
1000 TABLET ORAL DAILY
Qty: 120 TABLET | Refills: 3 | Status: ACTIVE | OUTPATIENT
Start: 2022-03-30 | End: 2022-06-27 | Stop reason: SDUPTHER

## 2022-03-30 NOTE — PROGRESS NOTES
[facility] Update    Date: 03/30/22    Patient's prescription benefits are being verified for coverage. Status update to follow as soon as possible. Please call us with any questions at [phone] opt.  6.

## 2022-03-30 NOTE — PROGRESS NOTES
[facility] [employer] [place] Update    Date: 03/30/22    Medication is currently being filled at [facility] and has been routed there. Please call us with any questions at [phone] opt.  6.

## 2022-03-30 NOTE — PROGRESS NOTES
Refill for Zytiga escribed to [address]. Directions to take 4 tablets (1000 mg) by mouth once daily. Refills 3.

## 2022-04-04 NOTE — PROGRESS NOTES
Patient Name: Best Rivera  Patient : 1965  Patient MRN: [id number]     Primary Oncologist: Mandy Alcantara MD  Referring Provider: Vielka Chow DO     Date of Service: 5/3/2022      Chief Complaint:   Chief Complaint   Patient presents with    Follow-up     He came in for follow-up visit. Patient Active Problem List:     Muscular dystrophy      Prostate cancer metastatic to bone      Anemia, unspecified     Malignant neoplasm of prostate     Family history of malignant neoplasm of breast     Encounter for nonprocreative genetic counseling     Interstitial pulmonary disease, unspecified     HPI:   Ute Benson is a pleasant 58-year-old  male patient was referred for this of the diagnosis of prostate cancer involving the bone, bladder and pelvic lymph node. Initially he presented with gross hematuria in 2019. He went to see family doctor and had an ultrasound of the bladder. 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 Invasive prostatic adenocarcinoma considered less likely. [place] PSA in 2019 was 29.51.  CT abdomen and pelvis on 2019 showed 3.7 cm intraluminal bladder mass likely representing transitional cell carcinoma rather than extension of the prostate. Chest x-ray showed no acute infiltrative process. On May 14, 2019 he underwent cystoscopy with transurethral resection of the bladder tumor, 2 cm in size and transrectal ultrasound-guided prostate needle biopsy. Pathology report of left prostate biopsy showed adenocarcinoma [place] score 4+4. Number cores positive out of 12. Proportion of prostatic tissue involved by tumor was 17%. Perineural invasion was seen. Pathology report of urinary bladder biopsy showed prostatic adenocarcinoma.   MRI of pelvis on 2019 showed large mass left peripheral zone extraprostatic extension including left seminal vesicle involvement and left neurovascular bundle involvement,  Multiple  enlarged lymph nodes, left pelvic side lymph node 1.0 x 2.0 cm, left pelvic sidewall lymph node 1.1 x 1.1 cm, right pelvic lymph node 1.5 cm. Bone scan on June 6, 2019 showed skeletal metastatic disease in the lower lumbar spine, sacrum and along the left sacroiliac joint. At present time he denies any specific bone pain. We went over NCCN guideline. Due to the lack of visceral disease and high tumor burden, I offered androgen deprivation therapy. He refused to consider surgical castration. I started him on Casodex prior to androgen deprivation therapy. I scheduled for chemo class. We discussed the potential role of radium therapy if he has more symptoms related to the bone cancer. We discussed the role of provenge/vaccination. He understands well about his prognosis and the goal of the therapy which is palliative. He is agreeable to consider genetic counseling. BMP in April 2019 was unremarkable. He received Eligard injection by urologist.  He started zytiga 7/2019. I reminded him to take prednisone along with zytiga. CT chest in July 2019 showed nonspecific 0.8 cm semisolid noncalcified nodule int RUL. Follow up in 6 months was recommended. PSA in August 2019 was 0.9. Bone scan and CT abdomen pelvis in October 2019 showed stable metastatic disease to the bone. PSA was 0.2. Labs in January 2020 were reviewed. PSA was 0.08. CT chest in January 2020 showed 0.7 cm cavitary lesion. He was found to have B-12 deficiency. He was on B-12 injection. He had lupron injection by urologist in January 2020. CT chest on April 27. 2020 :  1. Stable 7 mm partially cavitary nodule within the right apex. Recommend follow-up CT in 3 months or per clinical protocol. 2. Mild emphysematous changes with no other acute cardiopulmonary findings. PSA in April 2020 was 0.04.  Sister has given B-12 injection. CBC is stable. B-12 improved. CT chest 8/7/2020: Stable 7 mm pulmonary nodule seen within the right upper lobe recommend follow-up CT scan in 1 year's time. PSA was 0.01 in August 2020. He had Lupron injection in September 2020. Spouse has given B12 injection at home. PSA in 12/2/2020 was <0.1. Vitamin B-12 was 443, folate 18.1. CMP was unremarkable. CBC was stable. He had lupron on March 9, 2021. He stopped taking the B12 injection. He had Lupron injection on 2021-06-01. PSA in July 2021 was less than 0.1. CBC and CMP were stable. CT chest on 2021-07-06 showed  Unchanged tiny 7 mm right upper lobe solitary noncalcified pulmonary nodule back to January 2020, very doubtful significance.  No history of smoking in the medical chart.  Any follow-up of the chest at this point should be based on clinical factors. Bone density in July 2021 showed osteoporosis. He is on Zometa every 6 months. I recommended to take vitamin D and calcium. He is agreeable to have follow-up CT chest in July 2022. PSA in October 2021 was less than 0.1. I will continue to monitor PSA every 3 to 6 months. In October 2021 he has mild anemia. Anemic work-up was reviewed. CMP was stable for him. On February 2022 he came in for the Eligard injection. He has been adherent to Zytiga and prednisone. PSA in December 2021 was less than 0.1. On 5/3/2022 he came in for follow up visit. Bone scan on 2/9/2022:   New focus of right anterior 3rd rib activity, which can be due to healing rib  fracture or metastasis in this patient with history of osseous metastatic  disease.  Suggest correlation with PSA and continued attention on follow-up.   Previously demonstrated focal activity at L5 has resolved.   The scintigraphic pattern is otherwise similar to prior. He claimed that he has fracture to 3rd rib cage. CBC and CMP in April 2022 were stable for him. PSA was <0.1. He is due for eligard injection.  He has been adherent to Zytiga and prednisone. I scheduled follow-up CT chest abdomen pelvis in August 2022. He was seen by urologist recently and completed course of antibiotic for UTI. He c/o intermittent itching to his penis. On exam I saw dry glans penis and no suspicious lesion. I recommend to use lotion to testis and scrotum. No acute pain. Denied any nausea, vomiting or diarrhea. No fever or chills. No chest pain, shortness of breath or palpitation. No headache or dizzy spell. No specific bone pain. No melena or hematochezia. Denied any dysuria or hematuria. He had a Covid vaccine. Past Medical History  Prostate cancer [place] score 8, Bronson's muscular dystrophy. Surgical History  Colonoscopy and extirpation of lesion of colon     Social History  He smoked 1 to 2 pack/day for more than 20 years and quit on August 22, 2012. He denies any alcohol or illicit drug use. He has twin sons. Family History  Father: Colorectal cancer  Mother: Breast cancer  Grandfather - Maternal (2nd Degree): Lung cancer  Grandmother - Maternal (2nd Degree): Lung cancer. Current Therapy  Abiraterone + Prednisone + Leuprolide Q3M Cycle Length: 84 Number Cycles: 8 Start: C1D1 on 07/17/2019 Assoc Dx: Prostate cancer LOT: Primary Treatment Stage: JOSE Treatment Intent: Vjljpwcs98/30/2020 C1 D1  Leuprolide IM (3 month), 22.5 mg  Prednisone Oral, 5 mg  Abiraterone Oral, 1000 mg qday    Review of Systems: \"Per interval history; otherwise 10 point ROS is negative. \"    Vital Signs:  /75 (Site: Right Upper Arm, Position: Sitting, Cuff Size: Medium Adult)   Pulse 81   Temp 96.5 °F (35.8 °C) (Infrared)   Resp 16   Ht 5' 9\" (1.753 m)   SpO2 98%   BMI 24.66 kg/m²     Physical Exam:  CONSTITUTIONAL: awake, alert, cooperative, no apparent distress   EYES: sclera clear and conjunctiva normal  ENT: Normocephalic, without obvious abnormality, atraumatic  NECK: supple, symmetrical.  No JVD or goiter. HEMATOLOGIC/LYMPHATIC: no cervical, supraclavicular or axillary lymphadenopathy   LUNGS: no increased work of breathing and clear to auscultation   CARDIOVASCULAR: regular rate and rhythm, normal S1 and S2, no murmur   ABDOMEN: normal bowel sound, soft, non-distended, non-tender, no masses palpated, no hepatosplenomegaly   MUSCULOSKELETAL: decreased strength to LE. Has been using wheelchair. NEUROLOGIC:  Decreased power due to muscular dystrophy. CN II - XII grossly intact. SKIN: No jaundice. No ecchymosis. Warm skin. EXTREMITIES: no LE edema. No cyanosis.      Labs:  Hematology:  Lab Results   Component Value Date    WBC 7.2 04/26/2022    RBC 4.55 (L) 04/26/2022    HGB 13.4 (L) 04/26/2022    HCT 42.2 04/26/2022    MCV 92.7 04/26/2022    MCH 29.5 04/26/2022    MCHC 31.8 (L) 04/26/2022    RDW 14.1 04/26/2022     04/26/2022    MPV 11.6 (H) 04/26/2022    BANDSPCT 2 (L) 01/21/2020    SEGSPCT 65.2 04/26/2022    EOSRELPCT 2.5 04/26/2022    BASOPCT 0.4 04/26/2022    LYMPHOPCT 26.9 04/26/2022    MONOPCT 5.0 (H) 04/26/2022    BANDABS 0.10 01/21/2020    SEGSABS 4.7 04/26/2022    EOSABS 0.2 04/26/2022    BASOSABS 0.0 04/26/2022    LYMPHSABS 1.9 04/26/2022    MONOSABS 0.4 04/26/2022    DIFFTYPE AUTOMATED DIFFERENTIAL 04/26/2022    ANISOCYTOSIS 1+ 10/30/2019    POLYCHROM 1+ 01/21/2020    WBCMORP OCCASIONAL  ATYPICAL LYMPH(S) NOTED   01/21/2020    PLTM FEW 04/27/2020     Chemistry:  Lab Results   Component Value Date     04/26/2022    K 4.2 04/26/2022     04/26/2022    CO2 23 04/26/2022    BUN 14 04/26/2022    CREATININE 0.2 (L) 04/26/2022    GLUCOSE 123 (H) 04/26/2022    CALCIUM 9.2 04/26/2022    PROT 6.6 04/26/2022    LABALBU 4.7 04/26/2022    BILITOT 0.4 04/26/2022    ALKPHOS 61 04/26/2022    AST 20 04/26/2022    ALT 21 04/26/2022    LABGLOM >60 04/26/2022    GFRAA >60 04/26/2022    POCCA 1.15 01/20/2022    POCGLU 101 (H) 01/20/2022     Lab Results   Component Value Date     04/30/2011 Lab Results   Component Value Date    TSHHS 0.879 11/16/2021     Immunology:  Lab Results   Component Value Date    PROT 6.6 04/26/2022     Anemia Panel:  Lab Results   Component Value Date    ZSSSPIYC82 343.9 11/16/2021    FOLATE 10.1 11/16/2021     Tumor Markers:  Lab Results   Component Value Date    PSA <0.1 04/26/2022 11/16/2021 13:13  Ferritin: 359  Iron: 54 (L)  UIBC: 297  TIBC: 351  Transferrin %: 15  Folate: 10.1  Vitamin B-12: 343.9    Observations:  No data recorded     Assessment & Plan:   1. He had treatment naïve metastatic prostate cancer with involvement of the bone, baldder and pelvic lymph node. We went over NCCN guideline. Due to the lack of visceral disease, I offered Lupron and Zytiga plus prednisone. He understand the palliative goal of the therapy. I plan to have follow-up imaging study and PSA in 3 months. Urologist started him on Eligard. He started zytiga and prednisone in July 2019. PSA in August 2019 was 0.9. PSA in October 2019 was 0.2. CT abdomen and pelvis and bone scan in October 2019 showed stable findings. CT chest in January 2020 showed 0.7 cm cavitary lesion. PSA in January 2020 was 0.08. PSA in April 2020 was 0.08. Labs in August 2020 were reviewed. PSA in July 2021 was less than 0.1. He has been adherent to Zytiga and prednisone. He wanted to continue with Lupron every 6 months. PSA in October 2021 was less than 0.1. Clinically he has stable disease. I will check PSA every 3 - 6 months. He will have Eligard injection on 5/3/2022. Bone scan in February 2022 was reviewed. PSA in April 2022 was <0.1. He has been adherent to Zytiga and prednisone. 2.  CT chest in July 2019 showed nonspecific 0.8 cm RUL nodule. F/u in 6 months was recommended. CT chest in August 2021 was reviewed. I scheduled a follow-up CT chest, abdomen & pelvis in August 2022. .    3. He has Bronson's muscular dystrophy.  I referred him to see Genetic counselor- No pathogenic mutations noted.    4. B-12 deficiency. He stopped B-12 injection monthly. Spouse has given B-12 injection. B12 in June 2021 was 429.7. In October 2021 he has mild anemia. Anemic work-up in November 2021 was reviewed. He has anemia of chronic disease. 5.  Bone density test 6/8/2021:. The patient's bone mineral density is in the osteoporosis range.  The fracture risk is high. He is on Zometa every 6 months. I recommend to take vitamin D and calcium. 6.  He has intermittent erythematous rash to the upper extremities. He may call his dermatologist.  He is on Claritin. Discussed about healthy lifestyle and diet. He had Covid vaccine. Return to clinic in 3 months or sooner. All of his question has been answered for today. Recent imaging and labs were reviewed and discussed with the patient.

## 2022-04-26 ENCOUNTER — HOSPITAL ENCOUNTER (OUTPATIENT)
Dept: INFUSION THERAPY | Age: 57
Discharge: HOME | End: 2022-04-26
Payer: MEDICARE

## 2022-04-26 DIAGNOSIS — R91.1 LUNG NODULE: ICD-10-CM

## 2022-04-26 DIAGNOSIS — C79.51 PROSTATE CANCER METASTATIC TO BONE (HCC): ICD-10-CM

## 2022-04-26 DIAGNOSIS — C61 PROSTATE CANCER METASTATIC TO BONE (HCC): ICD-10-CM

## 2022-04-26 LAB
ALBUMIN SERPL-MCNC: 4.7 GM/DL (ref 3.4–5)
ALP BLD-CCNC: 61 IU/L (ref 40–128)
ALT SERPL-CCNC: 21 U/L (ref 10–40)
ANION GAP SERPL CALCULATED.3IONS-SCNC: 17 MMOL/L (ref 4–16)
AST SERPL-CCNC: 20 IU/L (ref 15–37)
BASOPHILS ABSOLUTE: 0 K/CU MM
BASOPHILS RELATIVE PERCENT: 0.4 % (ref 0–1)
BILIRUB SERPL-MCNC: 0.4 MG/DL (ref 0–1)
BUN BLDV-MCNC: 14 MG/DL (ref 6–23)
CALCIUM SERPL-MCNC: 9.2 MG/DL (ref 8.3–10.6)
CHLORIDE BLD-SCNC: 102 MMOL/L (ref 99–110)
CO2: 23 MMOL/L (ref 21–32)
CREAT SERPL-MCNC: 0.2 MG/DL (ref 0.9–1.3)
DIFFERENTIAL TYPE: ABNORMAL
EOSINOPHILS ABSOLUTE: 0.2 K/CU MM
EOSINOPHILS RELATIVE PERCENT: 2.5 % (ref 0–3)
GFR AFRICAN AMERICAN: >60 ML/MIN/1.73M2
GFR NON-AFRICAN AMERICAN: >60 ML/MIN/1.73M2
GLUCOSE BLD-MCNC: 123 MG/DL (ref 70–99)
HCT VFR BLD CALC: 42.2 % (ref 42–52)
HEMOGLOBIN: 13.4 GM/DL (ref 13.5–18)
LYMPHOCYTES ABSOLUTE: 1.9 K/CU MM
LYMPHOCYTES RELATIVE PERCENT: 26.9 % (ref 24–44)
MCH RBC QN AUTO: 29.5 PG (ref 27–31)
MCHC RBC AUTO-ENTMCNC: 31.8 % (ref 32–36)
MCV RBC AUTO: 92.7 FL (ref 78–100)
MONOCYTES ABSOLUTE: 0.4 K/CU MM
MONOCYTES RELATIVE PERCENT: 5 % (ref 0–4)
PDW BLD-RTO: 14.1 % (ref 11.7–14.9)
PLATELET # BLD: 179 K/CU MM (ref 140–440)
PMV BLD AUTO: 11.6 FL (ref 7.5–11.1)
POTASSIUM SERPL-SCNC: 4.2 MMOL/L (ref 3.5–5.1)
RBC # BLD: 4.55 M/CU MM (ref 4.6–6.2)
SEGMENTED NEUTROPHILS ABSOLUTE COUNT: 4.7 K/CU MM
SEGMENTED NEUTROPHILS RELATIVE PERCENT: 65.2 % (ref 36–66)
SODIUM BLD-SCNC: 142 MMOL/L (ref 135–145)
TOTAL PROTEIN: 6.6 GM/DL (ref 6.4–8.2)
WBC # BLD: 7.2 K/CU MM (ref 4–10.5)

## 2022-04-26 PROCEDURE — 84154 ASSAY OF PSA FREE: CPT

## 2022-04-26 PROCEDURE — 80053 COMPREHEN METABOLIC PANEL: CPT

## 2022-04-26 PROCEDURE — 84153 ASSAY OF PSA TOTAL: CPT

## 2022-04-26 PROCEDURE — 36415 COLL VENOUS BLD VENIPUNCTURE: CPT

## 2022-04-26 PROCEDURE — 85025 COMPLETE CBC W/AUTO DIFF WBC: CPT

## 2022-05-03 ENCOUNTER — OFFICE VISIT (OUTPATIENT)
Dept: ONCOLOGY | Age: 57
End: 2022-05-03
Payer: MEDICARE

## 2022-05-03 ENCOUNTER — HOSPITAL ENCOUNTER (OUTPATIENT)
Dept: INFUSION THERAPY | Age: 57
Discharge: HOME | End: 2022-05-03
Payer: MEDICARE

## 2022-05-03 VITALS
HEIGHT: 69 IN | RESPIRATION RATE: 16 BRPM | BODY MASS INDEX: 24.66 KG/M2 | HEART RATE: 81 BPM | OXYGEN SATURATION: 98 % | DIASTOLIC BLOOD PRESSURE: 75 MMHG | SYSTOLIC BLOOD PRESSURE: 127 MMHG | TEMPERATURE: 96.5 F

## 2022-05-03 DIAGNOSIS — C61 MALIGNANT NEOPLASM OF PROSTATE (HCC): Primary | ICD-10-CM

## 2022-05-03 DIAGNOSIS — C61 MALIGNANT NEOPLASM OF PROSTATE (HCC): ICD-10-CM

## 2022-05-03 DIAGNOSIS — C61 PROSTATE CANCER METASTATIC TO BONE (HCC): Chronic | ICD-10-CM

## 2022-05-03 DIAGNOSIS — C79.51 PROSTATE CANCER METASTATIC TO BONE (HCC): Chronic | ICD-10-CM

## 2022-05-03 PROCEDURE — 1036F TOBACCO NON-USER: CPT | Performed by: INTERNAL MEDICINE

## 2022-05-03 PROCEDURE — G8420 CALC BMI NORM PARAMETERS: HCPCS | Performed by: INTERNAL MEDICINE

## 2022-05-03 PROCEDURE — G8427 DOCREV CUR MEDS BY ELIG CLIN: HCPCS | Performed by: INTERNAL MEDICINE

## 2022-05-03 PROCEDURE — 6360000002 HC RX W HCPCS: Performed by: INTERNAL MEDICINE

## 2022-05-03 PROCEDURE — 99214 OFFICE O/P EST MOD 30 MIN: CPT | Performed by: INTERNAL MEDICINE

## 2022-05-03 PROCEDURE — 96402 CHEMO HORMON ANTINEOPL SQ/IM: CPT

## 2022-05-03 PROCEDURE — 3017F COLORECTAL CA SCREEN DOC REV: CPT | Performed by: INTERNAL MEDICINE

## 2022-05-03 PROCEDURE — 96372 THER/PROPH/DIAG INJ SC/IM: CPT

## 2022-05-03 RX ORDER — PREDNISONE 1 MG/1
5 TABLET ORAL DAILY
Qty: 30 TABLET | Refills: 12 | Status: SHIPPED | OUTPATIENT
Start: 2022-05-03

## 2022-05-03 RX ADMIN — LEUPROLIDE ACETATE 22.5 MG: KIT SUBCUTANEOUS at 13:57

## 2022-05-03 NOTE — PROGRESS NOTES
MA Rooming Questions  Patient: Jobie Baumgarten  MRN: [id number]    Date: 5/3/2022        1. Do you have any new issues?   no         2. Do you need any refills on medications?    no    3. Have you had any imaging done since your last visit?   no    4. Have you been hospitalized or seen in the emergency room since your last visit here?   no    5. Did the patient have a depression screening completed today?  No    No data recorded     PHQ-9 Given to (if applicable):               PHQ-9 Score (if applicable):                     [] Positive     []  Negative              Does question #9 need addressed (if applicable)                     [] Yes    []  No               [place] ROMERO Ortiz

## 2022-05-03 NOTE — PROGRESS NOTES
Patient arrived to treatment suite for Eligard injection. No questions or concerns for the doctor at this time. Treatment approved and given subcutaneously in left abdomen, band-aid applied. Patient tolerated well. Left treatment suite with assistance in wheelchair. Discharge instructions declined.

## 2022-06-01 ENCOUNTER — TELEPHONE (OUTPATIENT)
Dept: FAMILY MEDICINE CLINIC | Age: 57
End: 2022-06-01

## 2022-06-01 DIAGNOSIS — C79.51 MALIGNANT NEOPLASM METASTATIC TO BONE (HCC): ICD-10-CM

## 2022-06-01 DIAGNOSIS — U07.1 COVID-19: Primary | ICD-10-CM

## 2022-06-01 DIAGNOSIS — C61 MALIGNANT NEOPLASM OF PROSTATE (HCC): ICD-10-CM

## 2022-06-01 NOTE — TELEPHONE ENCOUNTER
To Dr. Catrachita Wilkinson--    Patient diagnosed with COVID----symptoms:    Coughing    Heaviness in chest    Nausea    Some Diarrhea    No Fever        Patient is fully vaccinated (has all 3 shots), last date of vaccination was 1/22/22. Please advise as to what he should do.     Phone:  [phone] on [phone]

## 2022-06-01 NOTE — TELEPHONE ENCOUNTER
I have sent paxlovid I don't belive this [facility] carries the medication I sent to [facility] outpatient pharmacy. Symptomatic treatment, tylenol prn. If patient spikes fever 100.4 go to ed.  Please discuss if ok with oncology team.

## 2022-06-27 DIAGNOSIS — C61 MALIGNANT NEOPLASM OF PROSTATE (HCC): ICD-10-CM

## 2022-06-27 RX ORDER — ABIRATERONE ACETATE 250 MG/1
1000 TABLET ORAL DAILY
Qty: 120 TABLET | Refills: 3 | Status: ACTIVE | OUTPATIENT
Start: 2022-06-27

## 2022-06-27 NOTE — PROGRESS NOTES
[facility]    Date: 06/27/22    Medication is currently being filled at [facility] and has been routed there. Patient is receiving free drug thru 7- . Please call us with any questions at [phone] opt.  2.

## 2022-06-27 NOTE — PROGRESS NOTES
[facility] Update    Date: 06/27/22    Patient's prescription benefits are being verified for coverage. Status update to follow as soon as possible. Please call us with any questions at [phone] opt.  2.

## 2022-07-07 NOTE — PROGRESS NOTES
Patient Name: Navin Colon  Patient : 1965  Patient MRN: [id number]     Primary Oncologist: Avery Summers MD  Referring Provider: Winsome Abrams DO     Date of Service: 2022      Chief Complaint:   Chief Complaint   Patient presents with    Follow-up     He came in for follow-up visit. Patient Active Problem List:     Muscular dystrophy      Prostate cancer metastatic to bone      Anemia, unspecified     Malignant neoplasm of prostate     Family history of malignant neoplasm of breast     Encounter for nonprocreative genetic counseling     Interstitial pulmonary disease, unspecified     HPI:   Josephine Pérez is a pleasant 69-year-old  male patient was referred for this of the diagnosis of prostate cancer involving the bone, bladder and pelvic lymph node. Initially he presented with gross hematuria in 2019. He went to see family doctor and had an ultrasound of the bladder. 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 Invasive prostatic adenocarcinoma considered less likely. [facility] PSA in 2019 was 29.51.  CT abdomen and pelvis on 2019 showed 3.7 cm intraluminal bladder mass likely representing transitional cell carcinoma rather than extension of the prostate. Chest x-ray showed no acute infiltrative process. On May 14, 2019 he underwent cystoscopy with transurethral resection of the bladder tumor, 2 cm in size and transrectal ultrasound-guided prostate needle biopsy. Pathology report of left prostate biopsy showed adenocarcinoma Stacey score 4+4. Number cores positive out of 12. Proportion of prostatic tissue involved by tumor was 17%. Perineural invasion was seen. Pathology report of urinary bladder biopsy showed prostatic adenocarcinoma.   MRI of pelvis on 2019 showed large mass left peripheral zone extraprostatic extension including left seminal vesicle involvement and left neurovascular bundle involvement,  Multiple  enlarged lymph nodes, left pelvic side lymph node 1.0 x 2.0 cm, left pelvic sidewall lymph node 1.1 x 1.1 cm, right pelvic lymph node 1.5 cm. Bone scan on June 6, 2019 showed skeletal metastatic disease in the lower lumbar spine, sacrum and along the left sacroiliac joint. At present time he denies any specific bone pain. We went over NCCN guideline. Due to the lack of visceral disease and high tumor burden, I offered androgen deprivation therapy. He refused to consider surgical castration. I started him on Casodex prior to androgen deprivation therapy. I scheduled for chemo class. We discussed the potential role of radium therapy if he has more symptoms related to the bone cancer. We discussed the role of provenge/vaccination. He understands well about his prognosis and the goal of the therapy which is palliative. He is agreeable to consider genetic counseling. BMP in April 2019 was unremarkable. He received Eligard injection by urologist.  He started zytiga 7/2019. I reminded him to take prednisone along with zytiga. CT chest in July 2019 showed nonspecific 0.8 cm semisolid noncalcified nodule int RUL. Follow up in 6 months was recommended. PSA in August 2019 was 0.9. Bone scan and CT abdomen pelvis in October 2019 showed stable metastatic disease to the bone. PSA was 0.2. Labs in January 2020 were reviewed. PSA was 0.08. CT chest in January 2020 showed 0.7 cm cavitary lesion. He was found to have B-12 deficiency. He was on B-12 injection. He had lupron injection by urologist in January 2020. CT chest on April 27. 2020 :  1. Stable 7 mm partially cavitary nodule within the right apex. Recommend follow-up CT in 3 months or per clinical protocol. 2. Mild emphysematous changes with no other acute cardiopulmonary findings. PSA in April 2020 was 0.04.  Sister has given B-12 injection. CBC is stable. B-12 improved. CT chest 8/7/2020: Stable 7 mm pulmonary nodule seen within the right upper lobe recommend follow-up CT scan in 1 year's time. PSA was 0.01 in August 2020. He had Lupron injection in September 2020. Spouse has given B12 injection at home. PSA in 12/2/2020 was <0.1. Vitamin B-12 was 443, folate 18.1. CMP was unremarkable. CBC was stable. He had lupron on March 9, 2021. He stopped taking the B12 injection. He had Lupron injection on 2021-06-01. PSA in July 2021 was less than 0.1. CBC and CMP were stable. CT chest on 2021-07-06 showed  Unchanged tiny 7 mm right upper lobe solitary noncalcified pulmonary nodule back to January 2020, very doubtful significance. No history of smoking in the medical chart. Any follow-up of the chest at this point should be based on clinical factors. Bone density in July 2021 showed osteoporosis. He is on Zometa every 6 months. I recommended to take vitamin D and calcium. He is agreeable to have follow-up CT chest in July 2022. PSA in October 2021 was less than 0.1. I will continue to monitor PSA every 3 to 6 months. In October 2021 he has mild anemia. Anemic work-up was reviewed. CMP was stable for him. On February 2022 he came in for the Eligard injection. He has been adherent to Zytiga and prednisone. PSA in December 2021 was less than 0.1. Bone scan on 2/9/2022:   New focus of right anterior 3rd rib activity, which can be due to healing rib fracture or metastasis in this patient with history of osseous metastatic disease. Suggest correlation with PSA and continued attention on follow-up. Previously demonstrated focal activity at L5 has resolved. The scintigraphic pattern is otherwise similar to prior. He claimed that he has fracture to 3rd rib cage. CBC and CMP in April 2022 were stable for him. PSA was <0.1. He is due for eligard injection. He has been adherent to Zytiga and prednisone.   I scheduled follow-up CT chest abdomen pelvis in August 2022. He was seen by urologist recently and completed course of antibiotic for UTI. He c/o intermittent itching to his penis. On exam I saw dry glans penis and no suspicious lesion. I recommend to use lotion to testis and scrotum. On 8/4/2022 he came in for follow up visit. CT CAP in 8/2022 will be rescheduled. He has been adherent to his Zytiga and prednisone. Labs in July 2022 were reviewed. Hemoglobin was 12. PSA was less than 0.1. He received Lupron injection and zoledronic acid infusion in July 2022. He denied any specific bone pain. I will recheck iron study prior to next office visit. He is on the wheelchair. Denied any nausea, vomiting or diarrhea. No fever or chills. No chest pain, shortness of breath or palpitation. No headache or dizzy spell. No specific bone pain. No melena or hematochezia. Denied any dysuria or hematuria. He had a Covid vaccine. Past Medical History  Prostate cancer Stacey score 8, Bronson's muscular dystrophy. Surgical History  Colonoscopy and extirpation of lesion of colon     Social History  He smoked 1 to 2 pack/day for more than 20 years and quit on August 22, 2012. He denies any alcohol or illicit drug use. He has twin sons. Family History  Father: Colorectal cancer  Mother: Breast cancer  Grandfather - Maternal (2nd Degree): Lung cancer  Grandmother - Maternal (2nd Degree): Lung cancer. Current Therapy  Abiraterone + Prednisone + Leuprolide Q3M Cycle Length: 84 Number Cycles: 8 Start: C1D1 on 07/17/2019 Assoc Dx: Prostate cancer LOT: Primary Treatment Stage: JOSE Treatment Intent: Zxdaxhsg46/30/2020 C1 D1  Leuprolide IM (3 month), 22.5 mg  Prednisone Oral, 5 mg  Abiraterone Oral, 1000 mg qday    Review of Systems: \"Per interval history; otherwise 10 point ROS is negative. \"    Vital Signs:  /69 (Site: Left Upper Arm, Position: Sitting, Cuff Size: Medium Adult)   Pulse 72   Temp 97.8 °F (36.6 °C) (Temporal)   Resp 16   Ht 5' 9\" (1.753 m)   SpO2 97%   BMI 24.66 kg/m²     Physical Exam:  CONSTITUTIONAL: awake, alert, cooperative, no apparent distress   EYES: sclera clear and conjunctiva normal. Pupils equal and round. ENT: Normocephalic, without obvious abnormality, atraumatic  NECK: supple, symmetrical.  No JVD or goiter. HEMATOLOGIC/LYMPHATIC: no cervical, supraclavicular or axillary lymphadenopathy   LUNGS: no increased work of breathing and clear to auscultation   CARDIOVASCULAR: regular rate and rhythm, normal S1 and S2, no murmur   ABDOMEN: normal bowel sound, soft, non-distended, non-tender, no masses palpated, no hepatosplenomegaly   MUSCULOSKELETAL: decreased strength to LE. He is on wheelchair. NEUROLOGIC:  Decreased power due to muscular dystrophy. CN II - XII grossly intact. SKIN: No jaundice. No ecchymosis. Warm skin. EXTREMITIES: no LE edema. No cyanosis.      Labs:  Hematology:  Lab Results   Component Value Date    WBC 6.3 07/26/2022    RBC 4.06 (L) 07/26/2022    HGB 12.0 (L) 07/26/2022    HCT 36.4 (L) 07/26/2022    MCV 89.7 07/26/2022    MCH 29.6 07/26/2022    MCHC 33.0 07/26/2022    RDW 14.0 07/26/2022     (L) 07/26/2022    MPV 12.4 (H) 07/26/2022    BANDSPCT 2 (L) 01/21/2020    SEGSPCT 63.5 07/26/2022    EOSRELPCT 3.0 07/26/2022    BASOPCT 0.5 07/26/2022    LYMPHOPCT 27.6 07/26/2022    MONOPCT 5.4 (H) 07/26/2022    BANDABS 0.10 01/21/2020    SEGSABS 4.0 07/26/2022    EOSABS 0.2 07/26/2022    BASOSABS 0.0 07/26/2022    LYMPHSABS 1.8 07/26/2022    MONOSABS 0.3 07/26/2022    DIFFTYPE AUTOMATED DIFFERENTIAL 07/26/2022    ANISOCYTOSIS 1+ 10/30/2019    POLYCHROM 1+ 01/21/2020    WBCMORP OCCASIONAL  ATYPICAL LYMPH(S) NOTED   01/21/2020    PLTM FEW 04/27/2020     Chemistry:  Lab Results   Component Value Date     07/26/2022    K 4.4 07/26/2022     07/26/2022    CO2 25 07/26/2022    BUN 9 07/26/2022    CREATININE 0.2 (L) 07/26/2022    GLUCOSE 92 07/26/2022 CALCIUM 9.7 07/26/2022    PROT 6.9 07/26/2022    LABALBU 4.6 07/26/2022    BILITOT 0.5 07/26/2022    ALKPHOS 78 07/26/2022    AST 20 07/26/2022    ALT 20 07/26/2022    LABGLOM >60 07/26/2022    GFRAA >60 07/26/2022    POCCA 1.18 07/20/2022    POCGLU 116 (H) 07/20/2022     Lab Results   Component Value Date     04/30/2011     Lab Results   Component Value Date    TSHHS 0.879 11/16/2021     Immunology:  Lab Results   Component Value Date    PROT 6.9 07/26/2022     Anemia Panel:  Lab Results   Component Value Date    CHLSEEPN46 343.9 11/16/2021    FOLATE 10.1 11/16/2021     Tumor Markers:  Lab Results   Component Value Date    PSA <0.1 07/20/2022 11/16/2021 Ferritin: 359 Iron: 54 (L) TIBC: 351 Transferrin %: 15  Folate: 10.1  Vitamin B-12: 343.9    Observations:  No data recorded     Assessment & Plan:   1. He had treatment naïve metastatic prostate cancer with involvement of the bone, baldder and pelvic lymph node. We went over NCCN guideline. Due to the lack of visceral disease, I offered Lupron and Zytiga plus prednisone. He understand the palliative goal of the therapy. I plan to have follow-up imaging study and PSA in 3 months. Urologist started him on Eligard. He started zytiga and prednisone in July 2019. PSA in August 2019 was 0.9. PSA in October 2019 was 0.2. CT abdomen and pelvis and bone scan in October 2019 showed stable findings. CT chest in January 2020 showed 0.7 cm cavitary lesion. PSA in January 2020 was 0.08. PSA in April 2020 was 0.08. Labs in August 2020 were reviewed. PSA in July 2021 was less than 0.1. He has been adherent to Zytiga and prednisone. He wanted to continue with Lupron every 6 months. PSA in October 2021 was less than 0.1. I will check PSA every 3 - 6 months. Bone scan in February 2022 was reviewed. PSA in April 2022 was <0.1. He has been adherent to Zytiga and prednisone. He had bone and Zometa in July 2022. PSA in July 2022 was less than 0.1.   He denied any adverse reaction related to Zytiga. 2.  CT chest in July 2019 showed nonspecific 0.8 cm RUL nodule. F/u in 6 months was recommended. CT chest in August 2021 was reviewed. I re schedule a follow-up CT chest, abdomen & pelvis in August 2022. Advised to call for the test result. 3. He has Bronson's muscular dystrophy. I referred him to see Genetic counselor- No pathogenic mutations noted. 4. B-12 deficiency. He stopped B-12 injection monthly. Spouse has given B-12 injection. B12 in June 2021 was 429.7. In October 2021 he has mild anemia. Anemic work-up in November 2021 was reviewed. He has anemia of chronic disease. I will repeat CBC and iron study prior to next office visit. 5.  Bone density test 6/8/2021:. The patient's bone mineral density is in the osteoporosis range. The fracture risk is high. He is on Zometa every 6 months. I recommend to take vitamin D and calcium. He denied any jaw necrosis. 6.  He has intermittent erythematous rash to the upper extremities. I recommend to follow-up with hematologist.  He is on Claritin. Discussed about healthy lifestyle and diet. He had Covid vaccine. Return to clinic in 3 months or sooner. All of his question has been answered for today. Recent imaging and labs were reviewed and discussed with the patient.

## 2022-07-15 RX ORDER — METHYLPREDNISOLONE SODIUM SUCCINATE 125 MG/2ML
125 INJECTION, POWDER, LYOPHILIZED, FOR SOLUTION INTRAMUSCULAR; INTRAVENOUS ONCE
Status: CANCELLED | OUTPATIENT
Start: 2022-07-20 | End: 2022-07-20

## 2022-07-15 RX ORDER — DIPHENHYDRAMINE HYDROCHLORIDE 50 MG/ML
50 INJECTION INTRAMUSCULAR; INTRAVENOUS ONCE
Status: CANCELLED | OUTPATIENT
Start: 2022-07-20 | End: 2022-07-20

## 2022-07-15 RX ORDER — HEPARIN SODIUM (PORCINE) LOCK FLUSH IV SOLN 100 UNIT/ML 100 UNIT/ML
500 SOLUTION INTRAVENOUS PRN
Status: CANCELLED | OUTPATIENT
Start: 2022-07-20

## 2022-07-15 RX ORDER — SODIUM CHLORIDE 9 MG/ML
20 INJECTION, SOLUTION INTRAVENOUS ONCE
Status: CANCELLED | OUTPATIENT
Start: 2022-07-20 | End: 2022-07-20

## 2022-07-15 RX ORDER — EPINEPHRINE 1 MG/ML
0.3 INJECTION, SOLUTION, CONCENTRATE INTRAVENOUS PRN
Status: CANCELLED | OUTPATIENT
Start: 2022-07-20

## 2022-07-15 RX ORDER — SODIUM CHLORIDE 9 MG/ML
25 INJECTION, SOLUTION INTRAVENOUS PRN
Status: CANCELLED | OUTPATIENT
Start: 2022-07-20

## 2022-07-15 RX ORDER — SODIUM CHLORIDE 0.9 % (FLUSH) 0.9 %
5-40 SYRINGE (ML) INJECTION PRN
Status: CANCELLED | OUTPATIENT
Start: 2022-07-20

## 2022-07-15 RX ORDER — SODIUM CHLORIDE 9 MG/ML
INJECTION, SOLUTION INTRAVENOUS CONTINUOUS
Status: CANCELLED | OUTPATIENT
Start: 2022-07-20

## 2022-07-20 ENCOUNTER — HOSPITAL ENCOUNTER (OUTPATIENT)
Dept: INFUSION THERAPY | Age: 57
Discharge: HOME | End: 2022-07-20
Payer: MEDICARE

## 2022-07-20 VITALS
DIASTOLIC BLOOD PRESSURE: 68 MMHG | HEART RATE: 70 BPM | OXYGEN SATURATION: 96 % | TEMPERATURE: 96.9 F | SYSTOLIC BLOOD PRESSURE: 124 MMHG

## 2022-07-20 DIAGNOSIS — C61 PROSTATE CANCER METASTATIC TO BONE (HCC): Chronic | ICD-10-CM

## 2022-07-20 DIAGNOSIS — C61 MALIGNANT NEOPLASM OF PROSTATE (HCC): Primary | ICD-10-CM

## 2022-07-20 DIAGNOSIS — C79.51 PROSTATE CANCER METASTATIC TO BONE (HCC): Chronic | ICD-10-CM

## 2022-07-20 LAB
BASOPHILS ABSOLUTE: 0 K/CU MM
BASOPHILS RELATIVE PERCENT: 0.2 % (ref 0–1)
DIFFERENTIAL TYPE: ABNORMAL
EOSINOPHILS ABSOLUTE: 0.2 K/CU MM
EOSINOPHILS RELATIVE PERCENT: 2.5 % (ref 0–3)
GFR AFRICAN AMERICAN: ABNORMAL ML/MIN/1.73M2
GFR NON-AFRICAN AMERICAN: ABNORMAL ML/MIN/1.73M2
GLUCOSE BLD-MCNC: 116 MG/DL (ref 70–99)
HCT VFR BLD CALC: 39.1 % (ref 42–52)
HEMOGLOBIN: 13 GM/DL (ref 13.5–18)
LYMPHOCYTES ABSOLUTE: 1.3 K/CU MM
LYMPHOCYTES RELATIVE PERCENT: 14 % (ref 24–44)
MCH RBC QN AUTO: 29.4 PG (ref 27–31)
MCHC RBC AUTO-ENTMCNC: 33.2 % (ref 32–36)
MCV RBC AUTO: 88.5 FL (ref 78–100)
MONOCYTES ABSOLUTE: 0.5 K/CU MM
MONOCYTES RELATIVE PERCENT: 5.8 % (ref 0–4)
PDW BLD-RTO: 14.3 % (ref 11.7–14.9)
PLATELET # BLD: 180 K/CU MM (ref 140–440)
PMV BLD AUTO: 11.2 FL (ref 7.5–11.1)
POC BUN: 6 MG/DL (ref 8–26)
POC CALCIUM: 1.18 MMOL/L (ref 1.12–1.32)
POC CHLORIDE: 103 MMOL/L (ref 98–109)
POC CO2: 24 MMOL/L (ref 21–32)
POC CREATININE: <0.3 MG/DL (ref 0.9–1.3)
POTASSIUM SERPL-SCNC: 4.3 MMOL/L (ref 3.5–4.5)
RBC # BLD: 4.42 M/CU MM (ref 4.6–6.2)
REASON FOR REJECTION: NORMAL
REJECTED TEST: NORMAL
SEGMENTED NEUTROPHILS ABSOLUTE COUNT: 6.9 K/CU MM
SEGMENTED NEUTROPHILS RELATIVE PERCENT: 77.5 % (ref 36–66)
SODIUM BLD-SCNC: 141 MMOL/L (ref 138–146)
SOURCE, BLOOD GAS: ABNORMAL
WBC # BLD: 8.9 K/CU MM (ref 4–10.5)

## 2022-07-20 PROCEDURE — 83550 IRON BINDING TEST: CPT

## 2022-07-20 PROCEDURE — 6360000002 HC RX W HCPCS: Performed by: INTERNAL MEDICINE

## 2022-07-20 PROCEDURE — 85025 COMPLETE CBC W/AUTO DIFF WBC: CPT

## 2022-07-20 PROCEDURE — 83540 ASSAY OF IRON: CPT

## 2022-07-20 PROCEDURE — 2580000003 HC RX 258: Performed by: INTERNAL MEDICINE

## 2022-07-20 PROCEDURE — 84154 ASSAY OF PSA FREE: CPT

## 2022-07-20 PROCEDURE — 82728 ASSAY OF FERRITIN: CPT

## 2022-07-20 PROCEDURE — 84153 ASSAY OF PSA TOTAL: CPT

## 2022-07-20 PROCEDURE — 96365 THER/PROPH/DIAG IV INF INIT: CPT

## 2022-07-20 RX ORDER — SODIUM CHLORIDE 0.9 % (FLUSH) 0.9 %
5-40 SYRINGE (ML) INJECTION PRN
Status: DISCONTINUED | OUTPATIENT
Start: 2022-07-20 | End: 2022-07-21 | Stop reason: HOSPADM

## 2022-07-20 RX ORDER — SODIUM CHLORIDE 9 MG/ML
20 INJECTION, SOLUTION INTRAVENOUS ONCE
Status: DISCONTINUED | OUTPATIENT
Start: 2022-07-20 | End: 2022-07-21 | Stop reason: HOSPADM

## 2022-07-20 RX ADMIN — ZOLEDRONIC ACID 4 MG: 4 INJECTION, SOLUTION, CONCENTRATE INTRAVENOUS at 12:24

## 2022-07-20 RX ADMIN — SODIUM CHLORIDE 20 ML/HR: 9 INJECTION, SOLUTION INTRAVENOUS at 12:24

## 2022-07-20 NOTE — PROGRESS NOTES
Pt. Here for zometa infusion. Peripheral IV started without difficulty, good blood return noted. Lab work drawn as ordered. Labs reviewed and treatment administered without incident. Pt. Has my chart and refused copy of discharge paperwork.

## 2022-07-21 ENCOUNTER — PATIENT MESSAGE (OUTPATIENT)
Dept: ONCOLOGY | Age: 57
End: 2022-07-21

## 2022-07-21 NOTE — TELEPHONE ENCOUNTER
From: Teofilo Smith  To: Dr. Amaya Mary  Sent: 2022 1:16 PM EDT  Subject: Breana Bowman BARBARA sent me an approval letter dated 2022 to get my Zytiga. [facility] after not getting any [facility] and they said it was . I faxed "[employer] the copy of the letter as attached then I called to follow up and somebody said they pressed the approval button by mistake. It will take 24 hours for them to receive the fax and I will call tomorrow. I only have 15 days of doses until my Zytiga runs out. I sent an email and left a voicemail [employer]. Please tell what I can do and help me with this issue. Thank You.   Nam Johnson  [phone]  1965

## 2022-07-21 NOTE — TELEPHONE ENCOUNTER
Spoke with patient advising I would fax to [employer] a copy of the physicians approval, along with the application and a copy of the successful fax transmission like they requested. All documents have been faxed. I will follow up with [employer] tomorrow and reach out to the patient via email letting him know an update. I am hopeful this will find resolution before he runs out of medication. We currently do not have samples of Zytiga and are not certain if the [facility] provides them, but I have requested our nursing staff look into seeing if we can obtain samples in the event we need to bridge a delay.

## 2022-07-26 ENCOUNTER — HOSPITAL ENCOUNTER (OUTPATIENT)
Dept: INFUSION THERAPY | Age: 57
Discharge: HOME | End: 2022-07-26
Payer: MEDICARE

## 2022-07-26 DIAGNOSIS — C61 MALIGNANT NEOPLASM OF PROSTATE (HCC): Primary | ICD-10-CM

## 2022-07-26 LAB
ALBUMIN SERPL-MCNC: 4.6 GM/DL (ref 3.4–5)
ALP BLD-CCNC: 78 IU/L (ref 40–128)
ALT SERPL-CCNC: 20 U/L (ref 10–40)
ANION GAP SERPL CALCULATED.3IONS-SCNC: 13 MMOL/L (ref 4–16)
AST SERPL-CCNC: 20 IU/L (ref 15–37)
BASOPHILS ABSOLUTE: 0 K/CU MM
BASOPHILS RELATIVE PERCENT: 0.5 % (ref 0–1)
BILIRUB SERPL-MCNC: 0.5 MG/DL (ref 0–1)
BUN BLDV-MCNC: 9 MG/DL (ref 6–23)
CALCIUM SERPL-MCNC: 9.7 MG/DL (ref 8.3–10.6)
CHLORIDE BLD-SCNC: 101 MMOL/L (ref 99–110)
CO2: 25 MMOL/L (ref 21–32)
CREAT SERPL-MCNC: 0.2 MG/DL (ref 0.9–1.3)
DIFFERENTIAL TYPE: ABNORMAL
EOSINOPHILS ABSOLUTE: 0.2 K/CU MM
EOSINOPHILS RELATIVE PERCENT: 3 % (ref 0–3)
GFR AFRICAN AMERICAN: >60 ML/MIN/1.73M2
GFR NON-AFRICAN AMERICAN: >60 ML/MIN/1.73M2
GLUCOSE BLD-MCNC: 92 MG/DL (ref 70–99)
HCT VFR BLD CALC: 36.4 % (ref 42–52)
HEMOGLOBIN: 12 GM/DL (ref 13.5–18)
LYMPHOCYTES ABSOLUTE: 1.8 K/CU MM
LYMPHOCYTES RELATIVE PERCENT: 27.6 % (ref 24–44)
MCH RBC QN AUTO: 29.6 PG (ref 27–31)
MCHC RBC AUTO-ENTMCNC: 33 % (ref 32–36)
MCV RBC AUTO: 89.7 FL (ref 78–100)
MONOCYTES ABSOLUTE: 0.3 K/CU MM
MONOCYTES RELATIVE PERCENT: 5.4 % (ref 0–4)
PDW BLD-RTO: 14 % (ref 11.7–14.9)
PLATELET # BLD: 131 K/CU MM (ref 140–440)
PMV BLD AUTO: 12.4 FL (ref 7.5–11.1)
POTASSIUM SERPL-SCNC: 4.4 MMOL/L (ref 3.5–5.1)
RBC # BLD: 4.06 M/CU MM (ref 4.6–6.2)
SEGMENTED NEUTROPHILS ABSOLUTE COUNT: 4 K/CU MM
SEGMENTED NEUTROPHILS RELATIVE PERCENT: 63.5 % (ref 36–66)
SODIUM BLD-SCNC: 139 MMOL/L (ref 135–145)
TOTAL PROTEIN: 6.9 GM/DL (ref 6.4–8.2)
WBC # BLD: 6.3 K/CU MM (ref 4–10.5)

## 2022-07-26 PROCEDURE — 96402 CHEMO HORMON ANTINEOPL SQ/IM: CPT

## 2022-07-26 PROCEDURE — 80053 COMPREHEN METABOLIC PANEL: CPT

## 2022-07-26 PROCEDURE — 85025 COMPLETE CBC W/AUTO DIFF WBC: CPT

## 2022-07-26 PROCEDURE — 6360000002 HC RX W HCPCS: Performed by: INTERNAL MEDICINE

## 2022-07-26 RX ADMIN — LEUPROLIDE ACETATE 22.5 MG: KIT SUBCUTANEOUS at 13:41

## 2022-07-26 NOTE — PROGRESS NOTES
Patient arrived to treatment suite for Leuprolide injection. Treatment approved and given subcutaneously in left abdomen, band-aid applied. After withdrawal of needle. Some medication was still in syringe. New needle was applied to syringe and remainder of medication given in RLQ. Pt tolerated well. Left treatment suite with assistance in wheelchair.   Discharge instructions provided

## 2022-07-28 ENCOUNTER — OFFICE VISIT (OUTPATIENT)
Dept: FAMILY MEDICINE CLINIC | Age: 57
End: 2022-07-28
Payer: MEDICARE

## 2022-07-28 VITALS
RESPIRATION RATE: 16 BRPM | HEIGHT: 69 IN | HEART RATE: 73 BPM | OXYGEN SATURATION: 97 % | SYSTOLIC BLOOD PRESSURE: 132 MMHG | BODY MASS INDEX: 24.66 KG/M2 | DIASTOLIC BLOOD PRESSURE: 72 MMHG

## 2022-07-28 DIAGNOSIS — D64.9 ANEMIA, UNSPECIFIED TYPE: ICD-10-CM

## 2022-07-28 DIAGNOSIS — E78.2 MIXED HYPERLIPIDEMIA: Primary | ICD-10-CM

## 2022-07-28 DIAGNOSIS — R60.0 EDEMA OF EXTREMITIES: ICD-10-CM

## 2022-07-28 PROCEDURE — G8420 CALC BMI NORM PARAMETERS: HCPCS | Performed by: STUDENT IN AN ORGANIZED HEALTH CARE EDUCATION/TRAINING PROGRAM

## 2022-07-28 PROCEDURE — 1036F TOBACCO NON-USER: CPT | Performed by: STUDENT IN AN ORGANIZED HEALTH CARE EDUCATION/TRAINING PROGRAM

## 2022-07-28 PROCEDURE — 99213 OFFICE O/P EST LOW 20 MIN: CPT | Performed by: STUDENT IN AN ORGANIZED HEALTH CARE EDUCATION/TRAINING PROGRAM

## 2022-07-28 PROCEDURE — G8427 DOCREV CUR MEDS BY ELIG CLIN: HCPCS | Performed by: STUDENT IN AN ORGANIZED HEALTH CARE EDUCATION/TRAINING PROGRAM

## 2022-07-28 PROCEDURE — 3017F COLORECTAL CA SCREEN DOC REV: CPT | Performed by: STUDENT IN AN ORGANIZED HEALTH CARE EDUCATION/TRAINING PROGRAM

## 2022-07-28 RX ORDER — CALCIUM CARBONATE 500(1250)
1500 TABLET ORAL EVERY OTHER DAY
COMMUNITY

## 2022-07-28 RX ORDER — TRIAMTERENE AND HYDROCHLOROTHIAZIDE 37.5; 25 MG/1; MG/1
1 TABLET ORAL DAILY PRN
Qty: 30 TABLET | Refills: 2 | Status: SHIPPED | OUTPATIENT
Start: 2022-07-28 | End: 2022-10-26

## 2022-07-28 ASSESSMENT — ENCOUNTER SYMPTOMS
SORE THROAT: 0
SHORTNESS OF BREATH: 0
WHEEZING: 0
NAUSEA: 0
ABDOMINAL PAIN: 0

## 2022-07-28 NOTE — PROGRESS NOTES
7/28/2022    Olman Nicholas    Chief Complaint   Patient presents with    6 Month Follow-Up     No concerns        HPI  History was obtained from patient. Gautam Mtz is a 61 y.o. male with a PMHx as listed below who presents today for 6 month follow up on chronic condtiions. No acute complaints. Recovered from covid completely. No issues  M strength at baseline,no issues PO tolerance      1. Mixed hyperlipidemia    2. Edema of extremities    3. Anemia, unspecified type             REVIEW OF SYMPTOMS    Review of Systems   Constitutional:  Negative for chills and fatigue. HENT:  Negative for congestion and sore throat. Respiratory:  Negative for shortness of breath and wheezing. Cardiovascular:  Negative for chest pain and palpitations. Gastrointestinal:  Negative for abdominal pain and nausea. Genitourinary:  Negative for frequency and urgency. Neurological:  Negative for light-headedness.      PAST MEDICAL HISTORY  Past Medical History:   Diagnosis Date    Mixed hyperlipidemia 10/24/2019       FAMILY HISTORY  Family History   Problem Relation Age of Onset    Hypertension Mother     Colon Cancer Father     Cancer Father     Hypertension Sister     Hypertension Maternal Grandmother        SOCIAL HISTORY  Social History     Socioeconomic History    Marital status: Single   Tobacco Use    Smoking status: Never    Smokeless tobacco: Never   Vaping Use    Vaping Use: Never used   Substance and Sexual Activity    Alcohol use: Not Currently     Social Determinants of Health     Financial Resource Strain: Low Risk     Difficulty of Paying Living Expenses: Not hard at all   Food Insecurity: No Food Insecurity    Worried About Running Out of Food in the Last Year: Never true    Ran Out of Food in the Last Year: Never true        SURGICAL HISTORY  Past Surgical History:   Procedure Laterality Date    FACIAL RECONSTRUCTION SURGERY N/A 1986    NOSE SURGERY N/A 1986                 CURRENT MEDICATIONS  Current Outpatient Medications   Medication Sig Dispense Refill    calcium carbonate (OSCAL) 500 MG TABS tablet Take 1,500 mg by mouth every other day      triamterene-hydroCHLOROthiazide (MAXZIDE-25) 37.5-25 MG per tablet Take 1 tablet by mouth daily as needed (feet swelling) 30 tablet 2    abiraterone acetate (ZYTIGA) 250 MG tablet Take 4 tablets by mouth daily 120 tablet 3    predniSONE (DELTASONE) 5 MG tablet Take 1 tablet by mouth daily 30 tablet 12    zoledronic acid (ZOMETA) 4 MG/5ML injection Infuse 4 mg intravenously every 6 months      calcium-vitamin D (OSCAL-500) 500-200 MG-UNIT per tablet Take 2 tablets by mouth every other day  (Patient not taking: Reported on 7/28/2022)       No current facility-administered medications for this visit. ALLERGIES  Allergies   Allergen Reactions    Bupropion     Prednisone Hives       PHYSICAL EXAM    /72 (Site: Right Upper Arm, Position: Sitting, Cuff Size: Medium Adult)   Pulse 73   Resp 16   Ht 5' 9\" (1.753 m)   SpO2 97%   BMI 24.66 kg/m²     Physical Exam  Constitutional:       Appearance: Normal appearance. HENT:      Head: Normocephalic and atraumatic. Eyes:      Extraocular Movements: Extraocular movements intact. Pupils: Pupils are equal, round, and reactive to light. Cardiovascular:      Rate and Rhythm: Normal rate and regular rhythm. Pulses: Normal pulses. Heart sounds: No murmur heard. No friction rub. No gallop. Pulmonary:      Effort: Pulmonary effort is normal.      Breath sounds: Normal breath sounds. Musculoskeletal:      Cervical back: Neck supple. Skin:     General: Skin is warm and dry. Neurological:      General: No focal deficit present. Mental Status: He is alert. Psychiatric:         Mood and Affect: Mood normal.         Behavior: Behavior normal.       ASSESSMENT & PLAN    1.  Edema of extremities  Anemia unspecified  Mixed Hyperlipidemia    -BP stable he uses maxzide on occasion with leg

## 2022-08-04 ENCOUNTER — OFFICE VISIT (OUTPATIENT)
Dept: ONCOLOGY | Age: 57
End: 2022-08-04
Payer: MEDICARE

## 2022-08-04 ENCOUNTER — HOSPITAL ENCOUNTER (OUTPATIENT)
Dept: INFUSION THERAPY | Age: 57
End: 2022-08-04

## 2022-08-04 VITALS
DIASTOLIC BLOOD PRESSURE: 69 MMHG | HEIGHT: 69 IN | OXYGEN SATURATION: 97 % | HEART RATE: 72 BPM | TEMPERATURE: 97.8 F | RESPIRATION RATE: 16 BRPM | SYSTOLIC BLOOD PRESSURE: 130 MMHG | BODY MASS INDEX: 24.66 KG/M2

## 2022-08-04 DIAGNOSIS — D64.9 ANEMIA, UNSPECIFIED TYPE: ICD-10-CM

## 2022-08-04 DIAGNOSIS — C61 PROSTATE CANCER (HCC): Primary | ICD-10-CM

## 2022-08-04 PROCEDURE — 1036F TOBACCO NON-USER: CPT | Performed by: INTERNAL MEDICINE

## 2022-08-04 PROCEDURE — G8427 DOCREV CUR MEDS BY ELIG CLIN: HCPCS | Performed by: INTERNAL MEDICINE

## 2022-08-04 PROCEDURE — 99214 OFFICE O/P EST MOD 30 MIN: CPT | Performed by: INTERNAL MEDICINE

## 2022-08-04 PROCEDURE — G8420 CALC BMI NORM PARAMETERS: HCPCS | Performed by: INTERNAL MEDICINE

## 2022-08-04 PROCEDURE — 3017F COLORECTAL CA SCREEN DOC REV: CPT | Performed by: INTERNAL MEDICINE

## 2022-08-04 NOTE — PROGRESS NOTES
MA Rooming Questions  Patient: Teofilo Smith  MRN: [id number]    Date: 8/4/2022        1. Do you have any new issues?   no         2. Do you need any refills on medications?    no    3. Have you had any imaging done since your last visit?   no    4. Have you been hospitalized or seen in the emergency room since your last visit here?   no    5. Did the patient have a depression screening completed today?  No    No data recorded     PHQ-9 Given to (if applicable):               PHQ-9 Score (if applicable):                     [] Positive     []  Negative              Does question #9 need addressed (if applicable)                     [] Yes    []  No               Joseline Lizarraga CMA

## 2022-08-11 ENCOUNTER — HOSPITAL ENCOUNTER (OUTPATIENT)
Dept: CT IMAGING | Age: 57
Discharge: HOME | End: 2022-08-11
Payer: MEDICARE

## 2022-08-11 DIAGNOSIS — C79.51 PROSTATE CANCER METASTATIC TO BONE (HCC): Chronic | ICD-10-CM

## 2022-08-11 DIAGNOSIS — C61 PROSTATE CANCER METASTATIC TO BONE (HCC): Chronic | ICD-10-CM

## 2022-08-11 PROCEDURE — 6360000004 HC RX CONTRAST MEDICATION: Performed by: INTERNAL MEDICINE

## 2022-08-11 PROCEDURE — 71260 CT THORAX DX C+: CPT

## 2022-08-11 PROCEDURE — 74177 CT ABD & PELVIS W/CONTRAST: CPT

## 2022-08-11 PROCEDURE — A4641 RADIOPHARM DX AGENT NOC: HCPCS | Performed by: INTERNAL MEDICINE

## 2022-08-11 RX ORDER — SODIUM CHLORIDE 0.9 % (FLUSH) 0.9 %
5-40 SYRINGE (ML) INJECTION PRN
Status: DISCONTINUED | OUTPATIENT
Start: 2022-08-11 | End: 2022-08-12 | Stop reason: HOSPADM

## 2022-08-11 RX ADMIN — IOPAMIDOL 75 ML: 755 INJECTION, SOLUTION INTRAVENOUS at 10:45

## 2022-08-11 RX ADMIN — BARIUM SULFATE 900 ML: 20 SUSPENSION ORAL at 09:20

## 2022-10-02 NOTE — PROGRESS NOTES
Patient Name: Deborah Menjivar  Patient : 1965  Patient MRN: [id number]     Primary Oncologist: Maddy Escalera MD  Referring Provider: Jer Hilton DO     Date of Service: 10/12/2022      Chief Complaint:   Chief Complaint   Patient presents with    Follow-up       He came in for follow-up visit. Patient Active Problem List:     Muscular dystrophy      Prostate cancer metastatic to bone      Anemia, unspecified     Malignant neoplasm of prostate     Family history of malignant neoplasm of breast     Encounter for nonprocreative genetic counseling     Interstitial pulmonary disease, unspecified     HPI:   Kirsten Bhatia is a pleasant 13-year-old  male patient was referred for this of the diagnosis of prostate cancer involving the bone, bladder and pelvic lymph node. Initially he presented with gross hematuria in 2019. He went to see family doctor and had an ultrasound of the bladder. 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 Invasive prostatic adenocarcinoma considered less likely. Loreaa Press PSA in 2019 was 29.51.  CT abdomen and pelvis on 2019 showed 3.7 cm intraluminal bladder mass likely representing transitional cell carcinoma rather than extension of the prostate. Chest x-ray showed no acute infiltrative process. On May 14, 2019 he underwent cystoscopy with transurethral resection of the bladder tumor, 2 cm in size and transrectal ultrasound-guided prostate needle biopsy. Pathology report of left prostate biopsy showed adenocarcinoma [place] score 4+4. Number cores positive out of 12. Proportion of prostatic tissue involved by tumor was 17%. Perineural invasion was seen. Pathology report of urinary bladder biopsy showed prostatic adenocarcinoma.   MRI of pelvis on 2019 showed large mass left peripheral zone extraprostatic extension including left seminal vesicle involvement and left neurovascular bundle involvement,  Multiple  enlarged lymph nodes, left pelvic side lymph node 1.0 x 2.0 cm, left pelvic sidewall lymph node 1.1 x 1.1 cm, right pelvic lymph node 1.5 cm. Bone scan on June 6, 2019 showed skeletal metastatic disease in the lower lumbar spine, sacrum and along the left sacroiliac joint. At present time he denies any specific bone pain. We went over NCCN guideline. Due to the lack of visceral disease and high tumor burden, I offered androgen deprivation therapy. He refused to consider surgical castration. I started him on Casodex prior to androgen deprivation therapy. I scheduled for chemo class. We discussed the potential role of radium therapy if he has more symptoms related to the bone cancer. We discussed the role of provenge/vaccination. He understands well about his prognosis and the goal of the therapy which is palliative. He is agreeable to consider genetic counseling. BMP in April 2019 was unremarkable. He received Eligard injection by urologist.  He started zytiga 7/2019. I reminded him to take prednisone along with zytiga. CT chest in July 2019 showed nonspecific 0.8 cm semisolid noncalcified nodule int RUL. Follow up in 6 months was recommended. PSA in August 2019 was 0.9. Bone scan and CT abdomen pelvis in October 2019 showed stable metastatic disease to the bone. PSA was 0.2. Labs in January 2020 were reviewed. PSA was 0.08. CT chest in January 2020 showed 0.7 cm cavitary lesion. He was found to have B-12 deficiency. He was on B-12 injection. He had lupron injection by urologist in January 2020. CT chest on April 27. 2020 :  1. Stable 7 mm partially cavitary nodule within the right apex. Recommend follow-up CT in 3 months or per clinical protocol. 2. Mild emphysematous changes with no other acute cardiopulmonary findings. PSA in April 2020 was 0.04.  Sister has given B-12 injection. CBC is stable. B-12 improved. CT chest 8/7/2020: Stable 7 mm pulmonary nodule seen within the right upper lobe recommend follow-up CT scan in 1 year's time. PSA was 0.01 in August 2020. He had Lupron injection in September 2020. Spouse has given B12 injection at home. PSA in 12/2/2020 was <0.1. Vitamin B-12 was 443, folate 18.1. CMP was unremarkable. CBC was stable. He had lupron on March 9, 2021. He stopped taking the B12 injection. He had Lupron injection on 2021-06-01. PSA in July 2021 was less than 0.1. CBC and CMP were stable. CT chest on 2021-07-06 showed  Unchanged tiny 7 mm right upper lobe solitary noncalcified pulmonary nodule back to January 2020, very doubtful significance. No history of smoking in the medical chart. Any follow-up of the chest at this point should be based on clinical factors. Bone density in July 2021 showed osteoporosis. He is on Zometa every 6 months. I recommended to take vitamin D and calcium. He is agreeable to have follow-up CT chest in July 2022. PSA in October 2021 was less than 0.1. I will continue to monitor PSA every 3 to 6 months. In October 2021 he has mild anemia. Anemic work-up was reviewed. CMP was stable for him. In February 2022 he came in for the Eligard injection. He has been adherent to Zytiga and prednisone. PSA in December 2021 was less than 0.1. Bone scan on 2/9/2022:   New focus of right anterior 3rd rib activity, which can be due to healing rib fracture or metastasis in this patient with history of osseous metastatic disease. Suggest correlation with PSA and continued attention on follow-up. Previously demonstrated focal activity at L5 has resolved. The scintigraphic pattern is otherwise similar to prior. He claimed that he has fracture to 3rd rib cage. CBC and CMP in April 2022 were stable for him. PSA was <0.1. He has been adherent to his Zytiga and prednisone. Labs in July 2022 were reviewed. Hemoglobin was 12. PSA was less than 0.1. He received Lupron injection and zoledronic acid infusion in July 2022.    10/12/2022 he came in for follow up visit. 7/2022 CMP grossly stable for him. Hg 12, MCV 89.7, plt 131. CT CAP 8/2022:  Stable subtle sclerosis in S1-L3 that may represent treated osseous metastasis. No new osseous metastatic disease or pathologic fracture. Stable right apical 8 mm noncalcified pulmonary nodule unchanged since 2020. Given stability compared to remote prior examinations this may represent sequela of prior inflammation. This is unlikely to represent metastatic disease. No definite evidence of new metastatic disease in the chest, abdomen or pelvis. He will continue with ADT, zytiga and zometa. He has labs today and I advise to call for result. I will check labs prior to next OV. Mother has stroke recently. He is on the wheelchair. Denied any nausea, vomiting or diarrhea. No fever or chills. No chest pain, shortness of breath or palpitation. No headache or dizzy spell. No specific bone pain. No melena or hematochezia. Denied any dysuria or hematuria. He had a Covid vaccine. Past Medical History  Prostate cancer [place] score 8, Bronson's muscular dystrophy. Surgical History  Colonoscopy and extirpation of lesion of colon     Social History  He smoked 1 to 2 pack/day for more than 20 years and quit on August 22, 2012. He denies any alcohol or illicit drug use. He has twin sons. Family History  Father: Colorectal cancer  Mother: Breast cancer  Grandfather - Maternal (2nd Degree): Lung cancer  Grandmother - Maternal (2nd Degree): Lung cancer.      Current Therapy  Abiraterone + Prednisone + Leuprolide Q3M Cycle Length: 84 Number Cycles: 8 Start: C1D1 on 07/17/2019 Assoc Dx: Prostate cancer LOT: Primary Treatment Stage: JOSE Treatment Intent: [id number]/30/2020 C1 D1  Leuprolide IM (3 month), 22.5 mg  Prednisone Oral, 5 mg  Abiraterone Oral, 1000 mg qday    Review of Systems: \"Per interval history; otherwise 10 point ROS is negative. \"    Vital Signs:  /74 (Site: Left Upper Arm, Position: Sitting, Cuff Size: Medium Adult)   Pulse 78   Temp 97.7 °F (36.5 °C) (Temporal)   Resp 16   Ht 5' 9\" (1.753 m)   SpO2 99%   BMI 24.66 kg/m²     Physical Exam:  CONSTITUTIONAL: awake, alert, cooperative, no apparent distress   EYES: sclera clear and conjunctiva normal. Pupils equal and round. ENT: Normocephalic, without obvious abnormality, atraumatic  NECK: supple, symmetrical.  No JVD or goiter. HEMATOLOGIC/LYMPHATIC: no cervical, supraclavicular or axillary lymphadenopathy   LUNGS: no increased work of breathing and clear to auscultation   CARDIOVASCULAR: regular rate and rhythm, normal S1 and S2, no murmur   ABDOMEN: normal bowel sound, soft, non-distended, non-tender, no masses palpated, no rebound or guarding. MUSCULOSKELETAL: decreased strength to LE. He is on wheelchair. NEUROLOGIC:  Decreased power due to muscular dystrophy. CN II - XII grossly intact. SKIN: No jaundice. No ecchymosis. Warm skin. EXTREMITIES: no LE edema. No cyanosis.  Homans negative    Labs:  Hematology:  Lab Results   Component Value Date    WBC 6.3 07/26/2022    RBC 4.06 (L) 07/26/2022    HGB 12.0 (L) 07/26/2022    HCT 36.4 (L) 07/26/2022    MCV 89.7 07/26/2022    MCH 29.6 07/26/2022    MCHC 33.0 07/26/2022    RDW 14.0 07/26/2022     (L) 07/26/2022    MPV 12.4 (H) 07/26/2022    BANDSPCT 2 (L) 01/21/2020    SEGSPCT 63.5 07/26/2022    EOSRELPCT 3.0 07/26/2022    BASOPCT 0.5 07/26/2022    LYMPHOPCT 27.6 07/26/2022    MONOPCT 5.4 (H) 07/26/2022    BANDABS 0.10 01/21/2020    SEGSABS 4.0 07/26/2022    EOSABS 0.2 07/26/2022    BASOSABS 0.0 07/26/2022    LYMPHSABS 1.8 07/26/2022    MONOSABS 0.3 07/26/2022    DIFFTYPE AUTOMATED DIFFERENTIAL 07/26/2022    ANISOCYTOSIS 1+ 10/30/2019    POLYCHROM 1+ 01/21/2020    WBCMORP OCCASIONAL  ATYPICAL LYMPH(S) NOTED   01/21/2020    PLTM FEW 04/27/2020     Chemistry:  Lab Results   Component Value Date     07/26/2022    K 4.4 07/26/2022     07/26/2022    CO2 25 07/26/2022    BUN 9 07/26/2022    CREATININE 0.2 (L) 07/26/2022    GLUCOSE 92 07/26/2022    CALCIUM 9.7 07/26/2022    PROT 6.9 07/26/2022    LABALBU 4.6 07/26/2022    BILITOT 0.5 07/26/2022    ALKPHOS 78 07/26/2022    AST 20 07/26/2022    ALT 20 07/26/2022    LABGLOM >60 07/26/2022    GFRAA >60 07/26/2022    POCCA 1.18 07/20/2022    POCGLU 116 (H) 07/20/2022     Lab Results   Component Value Date     04/30/2011     Lab Results   Component Value Date    TSHHS 0.879 11/16/2021     Immunology:  Lab Results   Component Value Date    PROT 6.9 07/26/2022     Anemia Panel:  Lab Results   Component Value Date    IPNNUHJL40 343.9 11/16/2021    FOLATE 10.1 11/16/2021     Tumor Markers:  Lab Results   Component Value Date    PSA <0.1 07/20/2022 11/16/2021 Ferritin: 359 Iron: 54 (L) TIBC: 351 Transferrin %: 15  Folate: 10.1  Vitamin B-12: 343.9    Observations:  No data recorded     Assessment & Plan:   1. He had treatment naïve metastatic prostate cancer with involvement of the bone, baldder and pelvic lymph node. We went over NCCN guideline. Due to the lack of visceral disease, I offered Lupron and Zytiga plus prednisone. He understand the palliative goal of the therapy. I plan to have follow-up imaging study and PSA in 3 months. Urologist started him on Eligard. He started zytiga and prednisone in July 2019. PSA in August 2019 was 0.9. PSA in October 2019 was 0.2. CT abdomen and pelvis and bone scan in October 2019 showed stable findings. CT chest in January 2020 showed 0.7 cm cavitary lesion. PSA in January 2020 was 0.08. PSA in April 2020 was 0.08. Labs in August 2020 were reviewed. PSA in July 2021 was less than 0.1. He has been adherent to Zytiga and prednisone. He wanted to continue with Lupron every 6 months. PSA in October 2021 was less than 0.1.     I will check PSA every 3 - 6 months. Bone scan in February 2022 was reviewed. PSA in April 2022 was <0.1. He has been adherent to Zytiga and prednisone. He had bone and Zometa in July 2022. PSA in July 2022 was less than 0.1. He denied any adverse reaction related to Zytiga. He has labs today and I advise to call for result. He will continue with ADT, zytiga and zometa. 2.  CT chest in July 2019 showed nonspecific 0.8 cm RUL nodule. F/u in 6 months was recommended. CT chest in August 2021 was reviewed. I re schedule a follow-up CT chest, abdomen & pelvis in August 2022. Advised to call for the test result. 3. He has Bronson's muscular dystrophy. I referred him to see Genetic counselor- No pathogenic mutations noted. 4. B-12 deficiency. He stopped B-12 injection monthly. Spouse has given B-12 injection. B12 in June 2021 was 429.7. In October 2021 he has mild anemia. Anemic work-up in November 2021 was reviewed. He has anemia of chronic disease. I will repeat CBC and iron study prior to next office visit. 5.  Bone density test 6/8/2021:. The patient's bone mineral density is in the osteoporosis range. The fracture risk is high. He is on Zometa every 6 months. I recommend to take vitamin D and calcium. He denied any jaw necrosis. 6.  He has intermittent erythematous rash to the upper extremities. I recommend to follow-up with dermatologist.  He is on Claritin. Discussed about healthy lifestyle and diet. He had Covid vaccine. Return to clinic in 3 months or sooner. All of his question has been answered for today. Recent imaging and labs were reviewed and discussed with the patient.

## 2022-10-12 ENCOUNTER — HOSPITAL ENCOUNTER (OUTPATIENT)
Dept: INFUSION THERAPY | Age: 57
Discharge: HOME | End: 2022-10-12
Payer: MEDICARE

## 2022-10-12 ENCOUNTER — OFFICE VISIT (OUTPATIENT)
Dept: ONCOLOGY | Age: 57
End: 2022-10-12
Payer: MEDICARE

## 2022-10-12 VITALS
RESPIRATION RATE: 16 BRPM | HEIGHT: 69 IN | BODY MASS INDEX: 24.66 KG/M2 | TEMPERATURE: 97.7 F | SYSTOLIC BLOOD PRESSURE: 135 MMHG | OXYGEN SATURATION: 99 % | DIASTOLIC BLOOD PRESSURE: 74 MMHG | HEART RATE: 78 BPM

## 2022-10-12 DIAGNOSIS — D64.9 ANEMIA, UNSPECIFIED TYPE: Primary | ICD-10-CM

## 2022-10-12 DIAGNOSIS — R53.83 OTHER FATIGUE: ICD-10-CM

## 2022-10-12 PROCEDURE — 99211 OFF/OP EST MAY X REQ PHY/QHP: CPT

## 2022-10-12 PROCEDURE — G8427 DOCREV CUR MEDS BY ELIG CLIN: HCPCS | Performed by: INTERNAL MEDICINE

## 2022-10-12 PROCEDURE — G8420 CALC BMI NORM PARAMETERS: HCPCS | Performed by: INTERNAL MEDICINE

## 2022-10-12 PROCEDURE — G8484 FLU IMMUNIZE NO ADMIN: HCPCS | Performed by: INTERNAL MEDICINE

## 2022-10-12 PROCEDURE — 3017F COLORECTAL CA SCREEN DOC REV: CPT | Performed by: INTERNAL MEDICINE

## 2022-10-12 PROCEDURE — 1036F TOBACCO NON-USER: CPT | Performed by: INTERNAL MEDICINE

## 2022-10-12 PROCEDURE — 99213 OFFICE O/P EST LOW 20 MIN: CPT | Performed by: INTERNAL MEDICINE

## 2022-10-18 ENCOUNTER — HOSPITAL ENCOUNTER (OUTPATIENT)
Dept: INFUSION THERAPY | Age: 57
Discharge: HOME | End: 2022-10-18
Payer: MEDICARE

## 2022-10-18 DIAGNOSIS — C61 MALIGNANT NEOPLASM OF PROSTATE (HCC): Primary | ICD-10-CM

## 2022-10-18 PROCEDURE — 96402 CHEMO HORMON ANTINEOPL SQ/IM: CPT

## 2022-10-18 PROCEDURE — 6360000002 HC RX W HCPCS: Performed by: INTERNAL MEDICINE

## 2022-10-18 RX ADMIN — LEUPROLIDE ACETATE 22.5 MG: KIT SUBCUTANEOUS at 13:39

## 2022-10-24 ENCOUNTER — COMMUNITY OUTREACH (OUTPATIENT)
Dept: FAMILY MEDICINE CLINIC | Age: 57
End: 2022-10-24

## 2022-10-28 ENCOUNTER — HOSPITAL ENCOUNTER (OUTPATIENT)
Dept: INFUSION THERAPY | Age: 57
Discharge: HOME | End: 2022-10-28
Payer: MEDICARE

## 2022-10-28 DIAGNOSIS — D64.9 ANEMIA, UNSPECIFIED TYPE: ICD-10-CM

## 2022-10-28 DIAGNOSIS — C61 PROSTATE CANCER (HCC): ICD-10-CM

## 2022-10-28 LAB
ALBUMIN SERPL-MCNC: 4.3 GM/DL (ref 3.4–5)
ALP BLD-CCNC: 59 IU/L (ref 40–129)
ALT SERPL-CCNC: 20 U/L (ref 10–40)
ANION GAP SERPL CALCULATED.3IONS-SCNC: 13 MMOL/L (ref 4–16)
AST SERPL-CCNC: 20 IU/L (ref 15–37)
BASOPHILS ABSOLUTE: 0 K/CU MM
BASOPHILS RELATIVE PERCENT: 0.3 % (ref 0–1)
BILIRUB SERPL-MCNC: 0.3 MG/DL (ref 0–1)
BUN BLDV-MCNC: 13 MG/DL (ref 6–23)
CALCIUM SERPL-MCNC: 8.9 MG/DL (ref 8.3–10.6)
CHLORIDE BLD-SCNC: 103 MMOL/L (ref 99–110)
CO2: 22 MMOL/L (ref 21–32)
CREAT SERPL-MCNC: 0.2 MG/DL (ref 0.9–1.3)
DIFFERENTIAL TYPE: ABNORMAL
EOSINOPHILS ABSOLUTE: 0.3 K/CU MM
EOSINOPHILS RELATIVE PERCENT: 3.5 % (ref 0–3)
FERRITIN: 284 NG/ML (ref 30–400)
GFR SERPL CREATININE-BSD FRML MDRD: >60 ML/MIN/1.73M2
GLUCOSE BLD-MCNC: 123 MG/DL (ref 70–99)
HCT VFR BLD CALC: 39.6 % (ref 42–52)
HEMOGLOBIN: 12.6 GM/DL (ref 13.5–18)
IRON: 49 UG/DL (ref 59–158)
LYMPHOCYTES ABSOLUTE: 1.4 K/CU MM
LYMPHOCYTES RELATIVE PERCENT: 17.5 % (ref 24–44)
MCH RBC QN AUTO: 29.4 PG (ref 27–31)
MCHC RBC AUTO-ENTMCNC: 31.8 % (ref 32–36)
MCV RBC AUTO: 92.3 FL (ref 78–100)
MONOCYTES ABSOLUTE: 0.5 K/CU MM
MONOCYTES RELATIVE PERCENT: 6.8 % (ref 0–4)
PCT TRANSFERRIN: 18 % (ref 10–44)
PDW BLD-RTO: 14 % (ref 11.7–14.9)
PLATELET # BLD: 157 K/CU MM (ref 140–440)
PMV BLD AUTO: 11.6 FL (ref 7.5–11.1)
POTASSIUM SERPL-SCNC: 4.2 MMOL/L (ref 3.5–5.1)
RBC # BLD: 4.29 M/CU MM (ref 4.6–6.2)
SEGMENTED NEUTROPHILS ABSOLUTE COUNT: 5.7 K/CU MM
SEGMENTED NEUTROPHILS RELATIVE PERCENT: 71.9 % (ref 36–66)
SODIUM BLD-SCNC: 138 MMOL/L (ref 135–145)
TOTAL IRON BINDING CAPACITY: 273 UG/DL (ref 250–450)
TOTAL PROTEIN: 6.6 GM/DL (ref 6.4–8.2)
UNSATURATED IRON BINDING CAPACITY: 224 UG/DL (ref 110–370)
WBC # BLD: 8 K/CU MM (ref 4–10.5)

## 2022-10-28 PROCEDURE — 83540 ASSAY OF IRON: CPT

## 2022-10-28 PROCEDURE — 36415 COLL VENOUS BLD VENIPUNCTURE: CPT

## 2022-10-28 PROCEDURE — 80053 COMPREHEN METABOLIC PANEL: CPT

## 2022-10-28 PROCEDURE — 83550 IRON BINDING TEST: CPT

## 2022-10-28 PROCEDURE — 85025 COMPLETE CBC W/AUTO DIFF WBC: CPT

## 2022-10-28 PROCEDURE — 84153 ASSAY OF PSA TOTAL: CPT

## 2022-10-28 PROCEDURE — 82728 ASSAY OF FERRITIN: CPT

## 2022-10-28 PROCEDURE — 84154 ASSAY OF PSA FREE: CPT

## 2022-11-08 ENCOUNTER — TELEPHONE (OUTPATIENT)
Dept: ONCOLOGY | Age: 57
End: 2022-11-08

## 2022-11-08 NOTE — TELEPHONE ENCOUNTER
Called patient @ [phone] to review lab results. Patient voices understanding. No further needs addressed at this time.

## 2022-11-09 DIAGNOSIS — C61 MALIGNANT NEOPLASM OF PROSTATE (HCC): ICD-10-CM

## 2022-11-09 RX ORDER — ABIRATERONE ACETATE 250 MG/1
1000 TABLET ORAL DAILY
Qty: 120 TABLET | Refills: 3 | Status: ACTIVE | OUTPATIENT
Start: 2022-11-09 | End: 2022-11-10 | Stop reason: SDUPTHER

## 2022-11-09 RX ORDER — ABIRATERONE ACETATE 250 MG/1
1000 TABLET ORAL DAILY
Qty: 120 TABLET | Refills: 3 | Status: SHIPPED | OUTPATIENT
Start: 2022-11-09 | End: 2022-11-09 | Stop reason: SDUPTHER

## 2022-11-10 DIAGNOSIS — C61 MALIGNANT NEOPLASM OF PROSTATE (HCC): ICD-10-CM

## 2022-11-10 RX ORDER — ABIRATERONE ACETATE 250 MG/1
1000 TABLET ORAL DAILY
Qty: 120 TABLET | Refills: 3 | Status: ACTIVE | OUTPATIENT
Start: 2022-11-10

## 2022-11-10 NOTE — TELEPHONE ENCOUNTER
Pt MINERVA requesting that Zytiga be sent to [facility].     Sent to [facility] for approval

## 2022-11-10 NOTE — TELEPHONE ENCOUNTER
Patient currently receives free drug through [facility] and requesting RX be sent there. RX for zytiga 250 mg (patient takes 4 tablets PO daily) e-scribed to [facility] per physician order.

## 2022-11-21 ENCOUNTER — PATIENT MESSAGE (OUTPATIENT)
Dept: FAMILY MEDICINE CLINIC | Age: 57
End: 2022-11-21

## 2022-11-23 NOTE — TELEPHONE ENCOUNTER
Plesae ask if still having symptoms he can have abx.  Given weekend and would want him on decongestants otc (robitussin dm)

## 2023-01-03 ENCOUNTER — TELEMEDICINE (OUTPATIENT)
Dept: FAMILY MEDICINE CLINIC | Age: 58
End: 2023-01-03
Payer: MEDICARE

## 2023-01-03 DIAGNOSIS — Z00.00 MEDICARE ANNUAL WELLNESS VISIT, SUBSEQUENT: Primary | ICD-10-CM

## 2023-01-03 PROCEDURE — G0439 PPPS, SUBSEQ VISIT: HCPCS | Performed by: STUDENT IN AN ORGANIZED HEALTH CARE EDUCATION/TRAINING PROGRAM

## 2023-01-03 PROCEDURE — 3017F COLORECTAL CA SCREEN DOC REV: CPT | Performed by: STUDENT IN AN ORGANIZED HEALTH CARE EDUCATION/TRAINING PROGRAM

## 2023-01-03 PROCEDURE — G8484 FLU IMMUNIZE NO ADMIN: HCPCS | Performed by: STUDENT IN AN ORGANIZED HEALTH CARE EDUCATION/TRAINING PROGRAM

## 2023-01-03 SDOH — ECONOMIC STABILITY: FOOD INSECURITY: WITHIN THE PAST 12 MONTHS, YOU WORRIED THAT YOUR FOOD WOULD RUN OUT BEFORE YOU GOT MONEY TO BUY MORE.: NEVER TRUE

## 2023-01-03 SDOH — ECONOMIC STABILITY: FOOD INSECURITY: WITHIN THE PAST 12 MONTHS, THE FOOD YOU BOUGHT JUST DIDN'T LAST AND YOU DIDN'T HAVE MONEY TO GET MORE.: NEVER TRUE

## 2023-01-03 SDOH — HEALTH STABILITY: PHYSICAL HEALTH: ON AVERAGE, HOW MANY MINUTES DO YOU ENGAGE IN EXERCISE AT THIS LEVEL?: 0 MIN

## 2023-01-03 SDOH — HEALTH STABILITY: PHYSICAL HEALTH: ON AVERAGE, HOW MANY DAYS PER WEEK DO YOU ENGAGE IN MODERATE TO STRENUOUS EXERCISE (LIKE A BRISK WALK)?: 0 DAYS

## 2023-01-03 ASSESSMENT — LIFESTYLE VARIABLES
HOW MANY STANDARD DRINKS CONTAINING ALCOHOL DO YOU HAVE ON A TYPICAL DAY: PATIENT DOES NOT DRINK
HOW OFTEN DO YOU HAVE SIX OR MORE DRINKS ON ONE OCCASION: 1
HOW OFTEN DO YOU HAVE A DRINK CONTAINING ALCOHOL: NEVER
HOW MANY STANDARD DRINKS CONTAINING ALCOHOL DO YOU HAVE ON A TYPICAL DAY: 0
HOW OFTEN DO YOU HAVE A DRINK CONTAINING ALCOHOL: NEVER
HOW MANY STANDARD DRINKS CONTAINING ALCOHOL DO YOU HAVE ON A TYPICAL DAY: PATIENT DOES NOT DRINK
HOW OFTEN DO YOU HAVE A DRINK CONTAINING ALCOHOL: 1

## 2023-01-03 ASSESSMENT — PATIENT HEALTH QUESTIONNAIRE - PHQ9
SUM OF ALL RESPONSES TO PHQ QUESTIONS 1-9: 0
1. LITTLE INTEREST OR PLEASURE IN DOING THINGS: 0
SUM OF ALL RESPONSES TO PHQ QUESTIONS 1-9: 0
SUM OF ALL RESPONSES TO PHQ9 QUESTIONS 1 & 2: 0
SUM OF ALL RESPONSES TO PHQ9 QUESTIONS 1 & 2: 0
2. FEELING DOWN, DEPRESSED OR HOPELESS: 0
SUM OF ALL RESPONSES TO PHQ QUESTIONS 1-9: 0
2. FEELING DOWN, DEPRESSED OR HOPELESS: 0
SUM OF ALL RESPONSES TO PHQ QUESTIONS 1-9: 0
1. LITTLE INTEREST OR PLEASURE IN DOING THINGS: 0

## 2023-01-03 ASSESSMENT — SOCIAL DETERMINANTS OF HEALTH (SDOH): HOW HARD IS IT FOR YOU TO PAY FOR THE VERY BASICS LIKE FOOD, HOUSING, MEDICAL CARE, AND HEATING?: NOT HARD AT ALL

## 2023-01-03 NOTE — PROGRESS NOTES
Medicare Annual Wellness Visit    Lesly Joyce is here for Medicare AWV    Assessment & Plan   Medicare annual wellness visit, subsequent      Recommendations for Preventive Services Due: see orders and patient instructions/AVS.  Recommended screening schedule for the next 5-10 years is provided to the patient in written form: see Patient Instructions/AVS.     No follow-ups on file. Subjective       Patient's complete Health Risk Assessment and screening values have been reviewed and are found in Flowsheets. The following problems were reviewed today and where indicated follow up appointments were made and/or referrals ordered. Positive Risk Factor Screenings with Interventions:    Fall Risk:  Do you feel unsteady or are you worried about falling? : no  2 or more falls in past year?: (!) yes  Fall with injury in past year?: no     Interventions:    Patient declines any further evaluation or treatment              Weight and Activity:  Physical Activity: Inactive    Days of Exercise per Week: 0 days    Minutes of Exercise per Session: 0 min     On average, how many days per week do you engage in moderate to strenuous exercise (like a brisk walk)?: 0 days  Have you lost any weight without trying in the past 3 months?: No         Inactivity Interventions:  Patient comments: patient is unable to exercise due to his physical limitations        Vision Screen:  Do you have difficulty driving, watching TV, or doing any of your daily activities because of your eyesight?: No  Have you had an eye exam within the past year?: (!) No  No results found.     Interventions:   Patient comments: patient states he will be making an eye appointment as soon as he gets a bill caught up     ADL's:   Patient reports needing help with:  Select all that apply: (!) Toileting (sister)  Select all that apply: [employer], Housekeeping, Banking/Finances, Shopping, Transportation, Food Preparation (sister)  Interventions:  Patient comments: patient states his sister helps him with his ADLs. Advanced Directives:  Do you have a Living Will?: (!) No    Intervention:  has NO advanced directive - information provided verbally                       Objective      Patient-Reported Vitals  No data recorded     Unable to obtain 3 vital signs due to patient not having equipment to take blood pressure/temperature. Allergies   Allergen Reactions    Bupropion     Prednisone Hives     Prior to Visit Medications    Medication Sig Taking? Authorizing Provider   Leuprolide Acetate (LUPRON IJ) Inject as directed Indications: pt reports every 4 months Yes Historical Provider, MD   abiraterone acetate (ZYTIGA) 250 MG tablet Take 4 tablets by mouth daily Yes Otilio Baum MD   calcium carbonate (OSCAL) 500 MG TABS tablet Take 1,500 mg by mouth every other day Yes Historical Provider, MD   predniSONE (DELTASONE) 5 MG tablet Take 1 tablet by mouth daily Yes Otilio Baum MD   zoledronic acid (ZOMETA) 4 MG/5ML injection Infuse 4 mg intravenously every 6 months Yes Historical Provider, MD   triamterene-hydroCHLOROthiazide (JRZIWOD-64) 37.5-25 MG per tablet Take 1 tablet by mouth daily as needed (feet swelling)  Brittanie Escobar DO   calcium-vitamin D (OSCAL-500) 500-200 MG-UNIT per tablet Take 2 tablets by mouth every other day  Patient not taking: Reported on 1/3/2023  Historical Provider, MD Yip (Including outside providers/suppliers regularly involved in providing care):   Patient Care Team:  Rere Lopez DO as PCP - General (Family Medicine)  Rere Lopez DO as PCP - [facility] Empaneled Provider     Reviewed and updated this visit:  Allergies  Meds         I, Kamla Stinson LPN, 5/1/[birth year]74, performed the documented evaluation under the direct supervision of the attending physician. Rupert Shaver, was evaluated through a synchronous (real-time) audio encounter.  The patient (or guardian if applicable) is aware that this is a billable service, which includes applicable co-pays. This Virtual Visit was conducted with patient's (and/or legal guardian's) consent. The visit was conducted pursuant to the emergency declaration under the [facility], [address] [place] [address] authority and the [employer] Act. Patient identification was verified, and a caregiver was present when appropriate. The patient was located at Home: [address]  [address] [address]. Provider was located at [address]): [address] [place] [address]. [place] [id number]. Total time spent for this encounter: Not billed by time    --Anabell Dias LPN on 3/9/0513 at 3:35 PM    An electronic signature was used to authenticate this note. This encounter was performed under Juancarlos person DOs, direct supervision, 1/3/2023.

## 2023-01-03 NOTE — PATIENT INSTRUCTIONS
Personalized Preventive Plan for Rupert Shaver - 1/3/2023  Medicare offers a range of preventive health benefits. Some of the tests and screenings are paid in full while other may be subject to a deductible, co-insurance, and/or copay. Some of these benefits include a comprehensive review of your medical history including lifestyle, illnesses that may run in your family, and various assessments and screenings as appropriate. After reviewing your medical record and screening and assessments performed today your provider may have ordered immunizations, labs, imaging, and/or referrals for you. A list of these orders (if applicable) as well as your Preventive Care list are included within your After Visit Summary for your review. Other Preventive Recommendations:    A preventive eye exam performed by an eye specialist is recommended every 1-2 years to screen for glaucoma; cataracts, macular degeneration, and other eye disorders. A preventive dental visit is recommended every 6 months. Try to get at least 150 minutes of exercise per week or 10,000 steps per day on a pedometer . Order or download the FREE \"Exercise & Physical Activity: Your Everyday Guide\" from [employer]. Call 8-370.365.3657 or search [employer] online. You need 9294-3435 mg of calcium and 3205-9168 IU of vitamin D per day. It is possible to meet your calcium requirement with diet alone, but a vitamin D supplement is usually necessary to meet this goal.  When exposed to the sun, use a sunscreen that protects against both UVA and UVB radiation with an SPF of 30 or greater. Reapply every 2 to 3 hours or after sweating, drying off with a towel, or swimming. Always wear a seat belt when traveling in a car. Always wear a helmet when riding a bicycle or motorcycle.

## 2023-01-04 ENCOUNTER — HOSPITAL ENCOUNTER (OUTPATIENT)
Dept: INFUSION THERAPY | Age: 58
Discharge: HOME | End: 2023-01-04
Payer: MEDICARE

## 2023-01-04 DIAGNOSIS — D64.9 ANEMIA, UNSPECIFIED TYPE: ICD-10-CM

## 2023-01-04 DIAGNOSIS — R53.83 OTHER FATIGUE: ICD-10-CM

## 2023-01-04 LAB
ALBUMIN SERPL-MCNC: 4.3 GM/DL (ref 3.4–5)
ALP BLD-CCNC: 65 IU/L (ref 40–129)
ALT SERPL-CCNC: 23 U/L (ref 10–40)
ANION GAP SERPL CALCULATED.3IONS-SCNC: 14 MMOL/L (ref 4–16)
AST SERPL-CCNC: 19 IU/L (ref 15–37)
BASOPHILS ABSOLUTE: 0 K/CU MM
BASOPHILS RELATIVE PERCENT: 0.2 % (ref 0–1)
BILIRUB SERPL-MCNC: 0.5 MG/DL (ref 0–1)
BUN BLDV-MCNC: 11 MG/DL (ref 6–23)
CALCIUM SERPL-MCNC: 9 MG/DL (ref 8.3–10.6)
CHLORIDE BLD-SCNC: 104 MMOL/L (ref 99–110)
CO2: 22 MMOL/L (ref 21–32)
CREAT SERPL-MCNC: <0.5 MG/DL (ref 0.9–1.3)
DIFFERENTIAL TYPE: ABNORMAL
EOSINOPHILS ABSOLUTE: 0.3 K/CU MM
EOSINOPHILS RELATIVE PERCENT: 3.3 % (ref 0–3)
FERRITIN: 308 NG/ML (ref 30–400)
FOLATE: 9.3 NG/ML (ref 3.1–17.5)
GFR SERPL CREATININE-BSD FRML MDRD: ABNORMAL ML/MIN/1.73M2
GLUCOSE BLD-MCNC: 107 MG/DL (ref 70–99)
HCT VFR BLD CALC: 38.6 % (ref 42–52)
HEMOGLOBIN: 12.6 GM/DL (ref 13.5–18)
IRON: 38 UG/DL (ref 59–158)
LYMPHOCYTES ABSOLUTE: 1.4 K/CU MM
LYMPHOCYTES RELATIVE PERCENT: 16.1 % (ref 24–44)
MCH RBC QN AUTO: 29.5 PG (ref 27–31)
MCHC RBC AUTO-ENTMCNC: 32.6 % (ref 32–36)
MCV RBC AUTO: 90.4 FL (ref 78–100)
MONOCYTES ABSOLUTE: 0.4 K/CU MM
MONOCYTES RELATIVE PERCENT: 5.1 % (ref 0–4)
PCT TRANSFERRIN: 13 % (ref 10–44)
PDW BLD-RTO: 14.5 % (ref 11.7–14.9)
PLATELET # BLD: 170 K/CU MM (ref 140–440)
PMV BLD AUTO: 10.7 FL (ref 7.5–11.1)
POTASSIUM SERPL-SCNC: 4.2 MMOL/L (ref 3.5–5.1)
RBC # BLD: 4.27 M/CU MM (ref 4.6–6.2)
SEGMENTED NEUTROPHILS ABSOLUTE COUNT: 6.4 K/CU MM
SEGMENTED NEUTROPHILS RELATIVE PERCENT: 75.3 % (ref 36–66)
SODIUM BLD-SCNC: 140 MMOL/L (ref 135–145)
TOTAL IRON BINDING CAPACITY: 298 UG/DL (ref 250–450)
TOTAL PROTEIN: 6.5 GM/DL (ref 6.4–8.2)
TSH HIGH SENSITIVITY: 0.8 UIU/ML (ref 0.27–4.2)
UNSATURATED IRON BINDING CAPACITY: 260 UG/DL (ref 110–370)
VITAMIN B-12: 340.1 PG/ML (ref 211–911)
WBC # BLD: 8.5 K/CU MM (ref 4–10.5)

## 2023-01-04 PROCEDURE — 83550 IRON BINDING TEST: CPT

## 2023-01-04 PROCEDURE — 85025 COMPLETE CBC W/AUTO DIFF WBC: CPT

## 2023-01-04 PROCEDURE — 84443 ASSAY THYROID STIM HORMONE: CPT

## 2023-01-04 PROCEDURE — 82607 VITAMIN B-12: CPT

## 2023-01-04 PROCEDURE — 84154 ASSAY OF PSA FREE: CPT

## 2023-01-04 PROCEDURE — 82746 ASSAY OF FOLIC ACID SERUM: CPT

## 2023-01-04 PROCEDURE — 83540 ASSAY OF IRON: CPT

## 2023-01-04 PROCEDURE — 84153 ASSAY OF PSA TOTAL: CPT

## 2023-01-04 PROCEDURE — 84480 ASSAY TRIIODOTHYRONINE (T3): CPT

## 2023-01-04 PROCEDURE — 80053 COMPREHEN METABOLIC PANEL: CPT

## 2023-01-04 PROCEDURE — 36415 COLL VENOUS BLD VENIPUNCTURE: CPT

## 2023-01-04 PROCEDURE — 82728 ASSAY OF FERRITIN: CPT

## 2023-01-04 NOTE — PROGRESS NOTES
Patient Name: Carri Moore  Patient : 1965  Patient MRN: [id number]     Primary Oncologist: Lavelle Artis MD  Referring Provider: Marino Liu DO     Date of Service: 2023      Chief Complaint:   Chief Complaint   Patient presents with    Follow-up    Results       He came in for follow-up visit. Patient Active Problem List:     Muscular dystrophy      Prostate cancer metastatic to bone      Anemia, unspecified     Malignant neoplasm of prostate     Family history of malignant neoplasm of breast     Encounter for nonprocreative genetic counseling     HPI:   Marry Horn is a pleasant 54-year-old  male patient was referred for this of the diagnosis of prostate cancer involving the bone, bladder and pelvic lymph node. Initially he presented with gross hematuria in 2019. He went to see family doctor and had an ultrasound of the bladder. 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 Invasive prostatic adenocarcinoma considered less likely. [facility] PSA in 2019 was 29.51.  CT abdomen and pelvis on 2019 showed 3.7 cm intraluminal bladder mass likely representing transitional cell carcinoma rather than extension of the prostate. Chest x-ray showed no acute infiltrative process. May 14, 2019 he underwent cystoscopy with transurethral resection of the bladder tumor, 2 cm in size and transrectal ultrasound-guided prostate needle biopsy. Pathology report of left prostate biopsy showed adenocarcinoma Arco score 4+4. Number cores positive out of 12. Proportion of prostatic tissue involved by tumor was 17%. Perineural invasion was seen. Pathology report of urinary bladder biopsy showed prostatic adenocarcinoma.   2019 MRI of pelvis showed large mass left peripheral zone extraprostatic extension including left seminal vesicle involvement and left neurovascular bundle involvement,  Multiple  enlarged lymph nodes, left pelvic side lymph node 1.0 x 2.0 cm, left pelvic sidewall lymph node 1.1 x 1.1 cm, right pelvic lymph node 1.5 cm. June 6, 2019 bone scan showed skeletal metastatic disease in the lower lumbar spine, sacrum and along the left sacroiliac joint. At present time he denies any specific bone pain. Due to the lack of visceral disease and high tumor burden, I offered androgen deprivation therapy. He refused to consider surgical castration. I started him on Casodex prior to androgen deprivation therapy. I scheduled for chemo class. We discussed the potential role of radium therapy if he has more symptoms related to the bone cancer. We discussed the role of provenge/vaccination. He understands well about his prognosis and the goal of the therapy which is palliative. He is agreeable to consider genetic counseling. He received Eligard injection by urologist.  He started zytiga 7/2019.    7/2019 CT chest showed nonspecific 0.8 cm semisolid noncalcified nodule int RUL. Follow up in 6 months was recommended. 8/2019 PSA 0.9. Bone scan and CT abdomen pelvis in October 2019 showed stable metastatic disease to the bone. 10/2019 PSA 0.2. January 2020 PSA 0.08. CT chest in January 2020 showed 0.7 cm cavitary lesion. He was found to have B-12 deficiency. He was on B-12 injection. He had lupron injection by urologist in January 2020. April 27, 2020 CT chest:  1. Stable 7 mm partially cavitary nodule within the right apex. Recommend follow-up CT in 3 months or per clinical protocol. 2. Mild emphysematous changes with no other acute cardiopulmonary findings. April 2020 PSA 0.04. Sister has given B-12 injection. CBC is stable. B-12 improved. CT chest 8/7/2020: Stable 7 mm pulmonary nodule seen within the right upper lobe recommend follow-up CT scan in 1 year's time. August 2020 PSA 0.01.   He had Lupron injection in September 2020. Spouse has given B12 injection at home. 12/2/2020 PSA <0.1. Vitamin B-12 was 443, folate 18.1. CMP and CBC stable. He had lupron on March 9, 2021. He stopped taking the B12 injection. He had Lupron injection on 2021-06-01. PSA in July 2021 was less than 0.1. CBC and CMP stable. 7/6/2021 CT chest: unchanged tiny 7 mm right upper lobe solitary noncalcified pulmonary nodule back to January 2020, very doubtful significance. No history of smoking in the medical chart. Any follow-up of the chest at this point should be based on clinical factors. Bone density in July 2021 showed osteoporosis. He is on Zometa every 6 months. I recommended to take vitamin D and calcium. 10/2021 PSA less than 0.1. I will continue to monitor PSA every 3 to 6 months. October 2021 he has mild anemia. Anemic work-up was reviewed. CMP was stable for him. February 2022 he had Eligard injection. December 2021 PSA less than 0.1.  2/9/2022 bone scan:   New focus of right anterior 3rd rib activity, which can be due to healing rib fracture or metastasis in this patient with history of osseous metastatic disease. Suggest correlation with PSA and continued attention on follow-up. Previously demonstrated focal activity at L5 has resolved. The scintigraphic pattern is otherwise similar to prior. He claimed that he has fracture to 3rd rib cage. April 2022 CBC and CMP stable for him. PSA was <0.1. He has been adherent to his Zytiga and prednisone. July 2022 hemoglobin 12. PSA was less than 0.1. He received Lupron injection and zoledronic acid infusion in July 2022.    7/2022 CMP grossly stable for him. Hg 12, MCV 89.7, plt 131. CT CAP 8/2022:  Stable subtle sclerosis in S1-L3 that may represent treated osseous metastasis. No new osseous metastatic disease or pathologic fracture. Stable right apical 8 mm noncalcified pulmonary nodule unchanged since 2020.   Given stability compared to remote prior examinations this may represent sequela of prior inflammation. This is unlikely to represent metastatic disease. No definite evidence of new metastatic disease in the chest, abdomen or pelvis. 1/18/2023 he came in for follow up visit. 1/4/2023 CMP grossly stable. B-12 340, folate 9.3. TSH and T3 wnl. Hg 12.6, MCV 90.4. Ferritin 308, TIBC 298. PSA < 0.1.  1/18/2023 zometa infusion. No jaw necrosis. He has itching rash due to prednisone. We discussed about switching ytiga to [facility] and he refused. We discussed about potential break of zytiga and zometa. He wants to wait till we have result of bone scan in 2/2023. He will continue with ADT, zytiga and zometa. No acute distress. He is on the wheelchair. Denied any nausea, vomiting or diarrhea. No fever or chills. No chest pain, shortness of breath or palpitation. No headache or dizzy spell. No specific bone pain. No melena or hematochezia. Denied any dysuria or hematuria. He had a Covid vaccine. Past Medical History  Prostate cancer Mesilla score 8, Bronson's muscular dystrophy. Surgical History  Colonoscopy and extirpation of lesion of colon     Social History  He smoked 1 to 2 pack/day for more than 20 years and quit on August 22, 2012. He denies any alcohol or illicit drug use. He has twin sons. Family History  Father: Colorectal cancer  Mother: Breast cancer  Grandfather - Maternal (2nd Degree): Lung cancer  Grandmother - Maternal (2nd Degree): Lung cancer. Current Therapy  Abiraterone + Prednisone + Leuprolide Q3M Cycle Length: 84 Number Cycles: 8 Start: C1D1 on 07/17/2019 Assoc Dx: Prostate cancer LOT: Primary Treatment Stage: JOSE Treatment Intent: Dutaqsol44/30/2020 C1 D1  Leuprolide IM (3 month), 22.5 mg  Prednisone Oral, 5 mg  Abiraterone Oral, 1000 mg qday    Review of Systems: \"Per interval history; otherwise 10 point ROS is negative. \"    Vital Signs:  /74 (Site: Right Upper Arm, Position: Sitting, Cuff Size: Medium Adult)   Pulse 80   Temp 98.7 °F (37.1 °C) (Temporal)   Ht 5' 9\" (1.753 m)   SpO2 98%   BMI 24.66 kg/m²     Physical Exam:  CONSTITUTIONAL: awake, alert, cooperative, no apparent distress   EYES: sclera clear and + pallor. Pupils equal and round. ENT: Normocephalic, without obvious abnormality, atraumatic  NECK: supple, symmetrical.  No JVD or goiter. HEMATOLOGIC/LYMPHATIC: no cervical, supraclavicular or axillary lymphadenopathy   LUNGS: no increased work of breathing and clear to auscultation   CARDIOVASCULAR: regular rate and rhythm, normal S1 and S2, no murmur   ABDOMEN: normal bowel sound, soft, non-distended, non-tender, no masses palpated, no rebound or guarding. MUSCULOSKELETAL: decreased strength to LE. He is on wheelchair. NEUROLOGIC:  Decreased power due to muscular dystrophy. CN II - XII grossly intact. SKIN: No jaundice. No ecchymosis. Papular rash to back. EXTREMITIES: no LE edema. No cyanosis.  Homans negative    Labs:  Hematology:  Lab Results   Component Value Date    WBC 8.5 01/04/2023    RBC 4.27 (L) 01/04/2023    HGB 12.6 (L) 01/04/2023    HCT 38.6 (L) 01/04/2023    MCV 90.4 01/04/2023    MCH 29.5 01/04/2023    MCHC 32.6 01/04/2023    RDW 14.5 01/04/2023     01/04/2023    MPV 10.7 01/04/2023    BANDSPCT 2 (L) 01/21/2020    SEGSPCT 75.3 (H) 01/04/2023    EOSRELPCT 3.3 (H) 01/04/2023    BASOPCT 0.2 01/04/2023    LYMPHOPCT 16.1 (L) 01/04/2023    MONOPCT 5.1 (H) 01/04/2023    BANDABS 0.10 01/21/2020    SEGSABS 6.4 01/04/2023    EOSABS 0.3 01/04/2023    BASOSABS 0.0 01/04/2023    LYMPHSABS 1.4 01/04/2023    MONOSABS 0.4 01/04/2023    DIFFTYPE AUTOMATED DIFFERENTIAL 01/04/2023    ANISOCYTOSIS 1+ 10/30/2019    POLYCHROM 1+ 01/21/2020    WBCMORP OCCASIONAL  ATYPICAL LYMPH(S) NOTED   01/21/2020    PLTM FEW 04/27/2020     Chemistry:  Lab Results   Component Value Date     01/04/2023    K 4.2 01/04/2023     01/04/2023    CO2 22 01/04/2023    BUN 11 01/04/2023 CREATININE <0.5 (L) 01/04/2023    GLUCOSE 107 (H) 01/04/2023    CALCIUM 9.0 01/04/2023    PROT 6.5 01/04/2023    LABALBU 4.3 01/04/2023    BILITOT 0.5 01/04/2023    ALKPHOS 65 01/04/2023    AST 19 01/04/2023    ALT 23 01/04/2023    LABGLOM NOT CALCULATED 01/04/2023    GFRAA >60 07/26/2022    POCCA 1.18 07/20/2022    POCGLU 116 (H) 07/20/2022     Lab Results   Component Value Date     04/30/2011     Lab Results   Component Value Date    TSHHS 0.798 01/04/2023     Immunology:  Lab Results   Component Value Date    PROT 6.5 01/04/2023     Anemia Panel:  Lab Results   Component Value Date    XODLDLMR03 340.1 01/04/2023    FOLATE 9.3 01/04/2023     Tumor Markers:  Lab Results   Component Value Date    PSA <0.1 01/04/2023 11/16/2021 Ferritin: 359 Iron: 54 (L) TIBC: 351 Transferrin %: 15  Folate: 10.1  Vitamin B-12: 343.9    Observations:  PHQ-9 Total Score: 0 (1/18/2023 10:01 AM)     Assessment & Plan:   1. He had treatment naïve metastatic prostate cancer with involvement of the bone, baldder and pelvic lymph node. We went over NCCN guideline. Due to the lack of visceral disease, I offered Lupron and Zytiga plus prednisone. He understand the palliative goal of the therapy. I plan to have follow-up imaging study and PSA in 3 months. Urologist started him on Eligard. He started zytiga and prednisone in July 2019. PSA in August 2019 was 0.9. PSA in October 2019 was 0.2. CT abdomen and pelvis and bone scan in October 2019 showed stable findings. CT chest in January 2020 showed 0.7 cm cavitary lesion. PSA in January 2020 was 0.08. PSA in April 2020 was 0.08. Labs in August 2020 were reviewed. PSA in July 2021 was less than 0.1. He has been adherent to Zytiga and prednisone. He wanted to continue with Lupron every 6 months. PSA in October 2021 was less than 0.1. I will check PSA every 3 - 6 months. Bone scan in February 2022 was reviewed. PSA in April 2022 was <0.1.   He has been adherent to [employer] and prednisone. He had bone and Zometa in July 2022. PSA in July 2022 was less than 0.1.    1/4/2023 CMP grossly stable. B-12 340, folate 9.3. TSH and T3 wnl. Hg 12.6, MCV 90.4. Ferritin 308, TIBC 298. PSA < 0.1.  1/18/2023 zometa infusion. No jaw necrosis. He has itching rash due to prednisone. We discussed about switching zytiga to [facility] and he refused. We discussed about potential break of zytiga and zometa. He wants to wait till we have result of bone scan in 2/2023. He will continue with ADT, zytiga and zometa. 2.  CT chest in July 2019 showed nonspecific 0.8 cm RUL nodule. CT CAP 8/2022:  Stable subtle sclerosis in S1-L3 that may represent treated osseous metastasis. No new osseous metastatic disease or pathologic fracture. Stable right apical 8 mm noncalcified pulmonary nodule unchanged since 2020. Given stability compared to remote prior examinations this may represent sequela of prior inflammation. This is unlikely to represent metastatic disease. No definite evidence of new metastatic disease in the chest, abdomen or pelvis. 3. He has Bronson's muscular dystrophy. I referred him to see Genetic counselor- No pathogenic mutations noted. 4. He has mid anemia. He has B-12 deficiency. Spouse has given B-12 injection monthly. B12 in June 2021 was 429.7.    1/4/2023 CMP grossly stable. B-12 340, folate 9.3. TSH and T3 wnl. Hg 12.6, MCV 90.4. Ferritin 308, TIBC 298. Will monitor CBC. 5.  Bone density test 6/8/2021:. The patient's bone mineral density is in the osteoporosis range. The fracture risk is high. He is on Zometa every 6 months. I recommend to take vitamin D and calcium. He denied any jaw necrosis. 6/2023 due for DEXA scan. 6.  He has rash to the upper extremities and lower back. I recommend to follow-up with dermatologist.  He is on Claritin. Discussed about healthy lifestyle and diet. He had Covid vaccine. Return to clinic in 3 months or sooner.   All of his question has been answered for today. Recent imaging and labs were reviewed and discussed with the patient.

## 2023-01-06 LAB
PROSTATE SPECIFIC ANTIGEN FREE: <0.1 NG/ML
PROSTATE SPECIFIC ANTIGEN PERCENT FREE: NORMAL %
PROSTATE SPECIFIC ANTIGEN: <0.1 NG/ML (ref 0–4)
T3 TOTAL: 91 NG/DL (ref 80–200)

## 2023-01-11 DIAGNOSIS — C61 PROSTATE CANCER METASTATIC TO BONE (HCC): Primary | ICD-10-CM

## 2023-01-11 DIAGNOSIS — C79.51 PROSTATE CANCER METASTATIC TO BONE (HCC): Primary | ICD-10-CM

## 2023-01-11 DIAGNOSIS — C61 MALIGNANT NEOPLASM OF PROSTATE (HCC): ICD-10-CM

## 2023-01-11 RX ORDER — ABIRATERONE ACETATE 250 MG/1
1000 TABLET ORAL DAILY
Qty: 120 TABLET | Refills: 3 | Status: SHIPPED | OUTPATIENT
Start: 2023-01-11

## 2023-01-18 ENCOUNTER — HOSPITAL ENCOUNTER (OUTPATIENT)
Dept: INFUSION THERAPY | Age: 58
Discharge: HOME | End: 2023-01-18
Payer: MEDICARE

## 2023-01-18 ENCOUNTER — APPOINTMENT (OUTPATIENT)
Dept: INFUSION THERAPY | Age: 58
End: 2023-01-18
Payer: MEDICARE

## 2023-01-18 ENCOUNTER — OFFICE VISIT (OUTPATIENT)
Dept: ONCOLOGY | Age: 58
End: 2023-01-18
Payer: MEDICARE

## 2023-01-18 ENCOUNTER — HOSPITAL ENCOUNTER (OUTPATIENT)
Dept: INFUSION THERAPY | Age: 58
End: 2023-01-18
Payer: MEDICARE

## 2023-01-18 VITALS
HEART RATE: 80 BPM | SYSTOLIC BLOOD PRESSURE: 132 MMHG | DIASTOLIC BLOOD PRESSURE: 74 MMHG | HEIGHT: 69 IN | OXYGEN SATURATION: 98 % | BODY MASS INDEX: 24.66 KG/M2 | TEMPERATURE: 98.7 F

## 2023-01-18 DIAGNOSIS — C79.51 PROSTATE CANCER METASTATIC TO BONE (HCC): Primary | ICD-10-CM

## 2023-01-18 DIAGNOSIS — C61 PROSTATE CANCER METASTATIC TO BONE (HCC): Primary | ICD-10-CM

## 2023-01-18 DIAGNOSIS — C61 PROSTATE CANCER METASTATIC TO BONE (HCC): ICD-10-CM

## 2023-01-18 DIAGNOSIS — C79.51 PROSTATE CANCER METASTATIC TO BONE (HCC): ICD-10-CM

## 2023-01-18 DIAGNOSIS — C41.9 METASTATIC BONE CANCER (HCC): Primary | ICD-10-CM

## 2023-01-18 LAB
ALBUMIN SERPL-MCNC: 4.6 GM/DL (ref 3.4–5)
ALP BLD-CCNC: 62 IU/L (ref 40–128)
ALT SERPL-CCNC: 18 U/L (ref 10–40)
ANION GAP SERPL CALCULATED.3IONS-SCNC: 13 MMOL/L (ref 4–16)
AST SERPL-CCNC: 19 IU/L (ref 15–37)
BILIRUB SERPL-MCNC: 0.3 MG/DL (ref 0–1)
BUN BLDV-MCNC: 11 MG/DL (ref 6–23)
CALCIUM SERPL-MCNC: 9.3 MG/DL (ref 8.3–10.6)
CHLORIDE BLD-SCNC: 104 MMOL/L (ref 99–110)
CO2: 24 MMOL/L (ref 21–32)
CREAT SERPL-MCNC: 0.2 MG/DL (ref 0.9–1.3)
EGFR, POC: >60 ML/MIN/1.73M2
GFR SERPL CREATININE-BSD FRML MDRD: >60 ML/MIN/1.73M2
GLUCOSE BLD-MCNC: 107 MG/DL (ref 70–99)
GLUCOSE BLD-MCNC: 95 MG/DL (ref 70–99)
POC BUN: 9 MG/DL (ref 8–26)
POC CALCIUM: 1.18 MMOL/L (ref 1.12–1.32)
POC CHLORIDE: 109 MMOL/L (ref 98–109)
POC CO2: 23 MMOL/L (ref 21–32)
POC CREATININE: 0.4 MG/DL (ref 0.9–1.3)
POTASSIUM SERPL-SCNC: 4 MMOL/L (ref 3.5–4.5)
POTASSIUM SERPL-SCNC: 4.2 MMOL/L (ref 3.5–5.1)
SODIUM BLD-SCNC: 141 MMOL/L (ref 135–145)
SODIUM BLD-SCNC: 143 MMOL/L (ref 138–146)
SOURCE, BLOOD GAS: ABNORMAL
TOTAL PROTEIN: 6.6 GM/DL (ref 6.4–8.2)

## 2023-01-18 PROCEDURE — G8427 DOCREV CUR MEDS BY ELIG CLIN: HCPCS | Performed by: INTERNAL MEDICINE

## 2023-01-18 PROCEDURE — G8484 FLU IMMUNIZE NO ADMIN: HCPCS | Performed by: INTERNAL MEDICINE

## 2023-01-18 PROCEDURE — 2580000003 HC RX 258: Performed by: INTERNAL MEDICINE

## 2023-01-18 PROCEDURE — G8420 CALC BMI NORM PARAMETERS: HCPCS | Performed by: INTERNAL MEDICINE

## 2023-01-18 PROCEDURE — 80053 COMPREHEN METABOLIC PANEL: CPT

## 2023-01-18 PROCEDURE — 1036F TOBACCO NON-USER: CPT | Performed by: INTERNAL MEDICINE

## 2023-01-18 PROCEDURE — 6360000002 HC RX W HCPCS: Performed by: INTERNAL MEDICINE

## 2023-01-18 PROCEDURE — 99214 OFFICE O/P EST MOD 30 MIN: CPT | Performed by: INTERNAL MEDICINE

## 2023-01-18 PROCEDURE — 3017F COLORECTAL CA SCREEN DOC REV: CPT | Performed by: INTERNAL MEDICINE

## 2023-01-18 PROCEDURE — 96365 THER/PROPH/DIAG IV INF INIT: CPT

## 2023-01-18 RX ORDER — DIPHENHYDRAMINE HYDROCHLORIDE 50 MG/ML
50 INJECTION INTRAMUSCULAR; INTRAVENOUS ONCE
Status: CANCELLED | OUTPATIENT
Start: 2023-01-18 | End: 2023-01-18

## 2023-01-18 RX ORDER — SODIUM CHLORIDE 9 MG/ML
20 INJECTION, SOLUTION INTRAVENOUS ONCE
Status: DISCONTINUED | OUTPATIENT
Start: 2023-01-18 | End: 2023-01-19 | Stop reason: HOSPADM

## 2023-01-18 RX ORDER — SODIUM CHLORIDE 9 MG/ML
INJECTION, SOLUTION INTRAVENOUS CONTINUOUS
Status: CANCELLED | OUTPATIENT
Start: 2023-01-18

## 2023-01-18 RX ORDER — SODIUM CHLORIDE 9 MG/ML
20 INJECTION, SOLUTION INTRAVENOUS ONCE
Status: CANCELLED | OUTPATIENT
Start: 2023-01-18 | End: 2023-01-18

## 2023-01-18 RX ORDER — HEPARIN SODIUM (PORCINE) LOCK FLUSH IV SOLN 100 UNIT/ML 100 UNIT/ML
500 SOLUTION INTRAVENOUS PRN
Status: CANCELLED | OUTPATIENT
Start: 2023-01-18

## 2023-01-18 RX ORDER — FAMOTIDINE 10 MG/ML
20 INJECTION, SOLUTION INTRAVENOUS ONCE
Status: CANCELLED | OUTPATIENT
Start: 2023-01-18 | End: 2023-01-18

## 2023-01-18 RX ORDER — METHYLPREDNISOLONE SODIUM SUCCINATE 125 MG/2ML
125 INJECTION, POWDER, LYOPHILIZED, FOR SOLUTION INTRAMUSCULAR; INTRAVENOUS ONCE
Status: CANCELLED | OUTPATIENT
Start: 2023-01-18 | End: 2023-01-18

## 2023-01-18 RX ORDER — SODIUM CHLORIDE 9 MG/ML
25 INJECTION, SOLUTION INTRAVENOUS PRN
Status: CANCELLED | OUTPATIENT
Start: 2023-01-18

## 2023-01-18 RX ORDER — EPINEPHRINE 1 MG/ML
0.3 INJECTION, SOLUTION, CONCENTRATE INTRAVENOUS PRN
Status: CANCELLED | OUTPATIENT
Start: 2023-01-18

## 2023-01-18 RX ORDER — WATER 1000 ML/1000ML
2.2 INJECTION, SOLUTION INTRAVENOUS ONCE
Status: CANCELLED | OUTPATIENT
Start: 2023-01-18 | End: 2023-01-18

## 2023-01-18 RX ORDER — SODIUM CHLORIDE 0.9 % (FLUSH) 0.9 %
5-40 SYRINGE (ML) INJECTION PRN
Status: CANCELLED | OUTPATIENT
Start: 2023-01-18

## 2023-01-18 RX ADMIN — ZOLEDRONIC ACID 4 MG: 4 INJECTION, SOLUTION, CONCENTRATE INTRAVENOUS at 11:57

## 2023-01-18 RX ADMIN — SODIUM CHLORIDE 20 ML/HR: 9 INJECTION, SOLUTION INTRAVENOUS at 11:53

## 2023-01-18 ASSESSMENT — PATIENT HEALTH QUESTIONNAIRE - PHQ9
SUM OF ALL RESPONSES TO PHQ QUESTIONS 1-9: 0
SUM OF ALL RESPONSES TO PHQ QUESTIONS 1-9: 0
2. FEELING DOWN, DEPRESSED OR HOPELESS: 0
1. LITTLE INTEREST OR PLEASURE IN DOING THINGS: 0
SUM OF ALL RESPONSES TO PHQ QUESTIONS 1-9: 0
SUM OF ALL RESPONSES TO PHQ QUESTIONS 1-9: 0
SUM OF ALL RESPONSES TO PHQ9 QUESTIONS 1 & 2: 0

## 2023-01-18 NOTE — PROGRESS NOTES
MA Rooming Questions  Patient: Freda Zuniga  MRN: [id number]    Date: 1/18/2023        1. Do you have any new issues? yes - itching due to prednisone allergy. Possible shingles on back       2. Do you need any refills on medications?    no    3. Have you had any imaging done since your last visit? yes - labs 1/4    4. Have you been hospitalized or seen in the emergency room since your last visit here?   no    5. Did the patient have a depression screening completed today?  Yes    PHQ-9 Total Score: 0 (1/18/2023 10:01 AM)       PHQ-9 Given to (if applicable):               PHQ-9 Score (if applicable):                     [] Positive     []  Negative              Does question #9 need addressed (if applicable)                     [] Yes    []  No               Celi Murillo, CMA

## 2023-01-18 NOTE — PROGRESS NOTES
To treatment suite via wheelchair for Zometa infusion from OV. #24 PIV placed in right hand without difficulty. Labs drawn as ordered. Flushed and locked with normal saline. Labs resulted, treatment plan approved and released. Zometa infusion given as ordered. Pt tolerated well. IV discontinued per protocol. AVS provided. Discharge in stable condition via wheelchair.   Avelina Oh RN

## 2023-01-20 DIAGNOSIS — C61 PROSTATE CANCER METASTATIC TO BONE (HCC): ICD-10-CM

## 2023-01-20 DIAGNOSIS — C79.51 PROSTATE CANCER METASTATIC TO BONE (HCC): ICD-10-CM

## 2023-01-20 DIAGNOSIS — C61 MALIGNANT NEOPLASM OF PROSTATE (HCC): ICD-10-CM

## 2023-01-20 RX ORDER — ABIRATERONE ACETATE 250 MG/1
1000 TABLET ORAL DAILY
Qty: 120 TABLET | Refills: 3 | Status: ACTIVE | OUTPATIENT
Start: 2023-01-20

## 2023-01-23 ENCOUNTER — TELEPHONE (OUTPATIENT)
Dept: ONCOLOGY | Age: 58
End: 2023-01-23

## 2023-01-23 NOTE — TELEPHONE ENCOUNTER
1/23/23 spoke w/pt regarding the 2/13/23 Bone scan at [facility] arrival times of 10:15 for injection and 2:15 for the scan. Pt cannot do Mondays and needs to reschedule the appt. I gave the pt the [phone] central scheduling to change the appt. I asked the pt to make sure the appt is rescheduled before the 2/20/23 Follow up appt.

## 2023-02-01 DIAGNOSIS — C79.51 PROSTATE CANCER METASTATIC TO BONE (HCC): ICD-10-CM

## 2023-02-01 DIAGNOSIS — C61 MALIGNANT NEOPLASM OF PROSTATE (HCC): ICD-10-CM

## 2023-02-01 DIAGNOSIS — C61 PROSTATE CANCER METASTATIC TO BONE (HCC): ICD-10-CM

## 2023-02-01 RX ORDER — ABIRATERONE ACETATE 250 MG/1
1000 TABLET ORAL DAILY
Qty: 120 TABLET | Refills: 3 | Status: SHIPPED | OUTPATIENT
Start: 2023-02-01

## 2023-02-02 ENCOUNTER — TELEPHONE (OUTPATIENT)
Dept: INFUSION THERAPY | Age: 58
End: 2023-02-02

## 2023-02-02 NOTE — TELEPHONE ENCOUNTER
Pt LVM at 8:58 requesting to schedule injection appt. Returned call & LVM with my direct line asking for a return call to schedule appt. Pt returned call @ 2:05 & appt given for 2/7 @ 2:00 for injection; pt voiced understanding.

## 2023-02-07 ENCOUNTER — HOSPITAL ENCOUNTER (OUTPATIENT)
Dept: INFUSION THERAPY | Age: 58
Discharge: HOME | End: 2023-02-07
Payer: MEDICARE

## 2023-02-07 DIAGNOSIS — C61 MALIGNANT NEOPLASM OF PROSTATE (HCC): Primary | ICD-10-CM

## 2023-02-07 PROCEDURE — 96372 THER/PROPH/DIAG INJ SC/IM: CPT

## 2023-02-07 PROCEDURE — 6360000002 HC RX W HCPCS: Performed by: INTERNAL MEDICINE

## 2023-02-07 RX ADMIN — LEUPROLIDE ACETATE 22.5 MG: KIT SUBCUTANEOUS at 14:12

## 2023-02-07 NOTE — PROGRESS NOTES
Pt here for Eligard injection. Pt has no complaints. Injection given SQ to RLQ. Pt tolerated with no complaints.  Left ambulatory discharge instructions given

## 2023-02-14 ENCOUNTER — HOSPITAL ENCOUNTER (OUTPATIENT)
Dept: NUCLEAR MEDICINE | Age: 58
Discharge: HOME | End: 2023-02-14
Payer: MEDICARE

## 2023-02-14 DIAGNOSIS — C41.9 METASTATIC BONE CANCER (HCC): ICD-10-CM

## 2023-02-14 PROCEDURE — G1010 CDSM STANSON: HCPCS | Performed by: INTERNAL MEDICINE

## 2023-02-14 PROCEDURE — A9503 TC99M MEDRONATE: HCPCS | Performed by: INTERNAL MEDICINE

## 2023-02-14 PROCEDURE — 3430000000 HC RX DIAGNOSTIC RADIOPHARMACEUTICAL: Performed by: INTERNAL MEDICINE

## 2023-02-14 RX ORDER — TC 99M MEDRONATE 20 MG/10ML
23.7 INJECTION, POWDER, LYOPHILIZED, FOR SOLUTION INTRAVENOUS
Status: COMPLETED | OUTPATIENT
Start: 2023-02-14 | End: 2023-02-14

## 2023-02-14 RX ADMIN — TC 99M MEDRONATE 23.7 MILLICURIE: 20 INJECTION, POWDER, LYOPHILIZED, FOR SOLUTION INTRAVENOUS at 10:56

## 2023-02-21 ENCOUNTER — OFFICE VISIT (OUTPATIENT)
Dept: ONCOLOGY | Age: 58
End: 2023-02-21
Payer: MEDICARE

## 2023-02-21 ENCOUNTER — HOSPITAL ENCOUNTER (OUTPATIENT)
Dept: INFUSION THERAPY | Age: 58
Discharge: HOME | End: 2023-02-21
Payer: MEDICARE

## 2023-02-21 VITALS
TEMPERATURE: 97.4 F | OXYGEN SATURATION: 98 % | HEART RATE: 80 BPM | SYSTOLIC BLOOD PRESSURE: 133 MMHG | DIASTOLIC BLOOD PRESSURE: 70 MMHG | HEIGHT: 69 IN | RESPIRATION RATE: 18 BRPM | BODY MASS INDEX: 24.66 KG/M2

## 2023-02-21 DIAGNOSIS — C41.9 METASTATIC BONE CANCER (HCC): Primary | ICD-10-CM

## 2023-02-21 PROCEDURE — G8420 CALC BMI NORM PARAMETERS: HCPCS | Performed by: INTERNAL MEDICINE

## 2023-02-21 PROCEDURE — G8484 FLU IMMUNIZE NO ADMIN: HCPCS | Performed by: INTERNAL MEDICINE

## 2023-02-21 PROCEDURE — G8427 DOCREV CUR MEDS BY ELIG CLIN: HCPCS | Performed by: INTERNAL MEDICINE

## 2023-02-21 PROCEDURE — 1036F TOBACCO NON-USER: CPT | Performed by: INTERNAL MEDICINE

## 2023-02-21 PROCEDURE — 99211 OFF/OP EST MAY X REQ PHY/QHP: CPT

## 2023-02-21 PROCEDURE — 3017F COLORECTAL CA SCREEN DOC REV: CPT | Performed by: INTERNAL MEDICINE

## 2023-02-21 PROCEDURE — 99213 OFFICE O/P EST LOW 20 MIN: CPT | Performed by: INTERNAL MEDICINE

## 2023-02-21 NOTE — PROGRESS NOTES
MA Rooming Questions  Patient: Deborah Menjivar  MRN: [id number]    Date: 2/21/2023        1. Do you have any new issues?   no         2. Do you need any refills on medications?    no    3. Have you had any imaging done since your last visit? yes - bone scan 2/14    4. Have you been hospitalized or seen in the emergency room since your last visit here?   no    5. Did the patient have a depression screening completed today?  No    No data recorded     PHQ-9 Given to (if applicable):               PHQ-9 Score (if applicable):                     [] Positive     []  Negative              Does question #9 need addressed (if applicable)                     [] Yes    []  No               Linda Laurent MA

## 2023-03-15 ENCOUNTER — CLINICAL DOCUMENTATION (OUTPATIENT)
Dept: ONCOLOGY | Age: 58
End: 2023-03-15

## 2023-03-15 NOTE — PROGRESS NOTES
This nurse received a call from [address] [place] [address] in regards to if the patient's prescription for Zytiga needs to be cancelled. This nurse informed them that the patient is refusing to take the medication and that the provider did not DC it. This nurse advised the pharmacy to place the order on hold at this time and the office will advise after the patient's next OV in April.

## 2023-04-04 ENCOUNTER — HOSPITAL ENCOUNTER (OUTPATIENT)
Dept: INFUSION THERAPY | Age: 58
Discharge: HOME | End: 2023-04-04
Payer: MEDICARE

## 2023-04-04 LAB
ALBUMIN SERPL-MCNC: 4.2 GM/DL (ref 3.4–5)
ALP BLD-CCNC: 93 IU/L (ref 40–128)
ALT SERPL-CCNC: 46 U/L (ref 10–40)
ANION GAP SERPL CALCULATED.3IONS-SCNC: 12 MMOL/L (ref 4–16)
AST SERPL-CCNC: 37 IU/L (ref 15–37)
BASOPHILS ABSOLUTE: 0 K/CU MM
BASOPHILS RELATIVE PERCENT: 0.3 % (ref 0–1)
BILIRUB SERPL-MCNC: 0.3 MG/DL (ref 0–1)
BUN SERPL-MCNC: 9 MG/DL (ref 6–23)
CALCIUM SERPL-MCNC: 9.1 MG/DL (ref 8.3–10.6)
CHLORIDE BLD-SCNC: 101 MMOL/L (ref 99–110)
CO2: 25 MMOL/L (ref 21–32)
CREAT SERPL-MCNC: 0.2 MG/DL (ref 0.9–1.3)
DIFFERENTIAL TYPE: ABNORMAL
EOSINOPHILS ABSOLUTE: 0.3 K/CU MM
EOSINOPHILS RELATIVE PERCENT: 5.1 % (ref 0–3)
GFR SERPL CREATININE-BSD FRML MDRD: >60 ML/MIN/1.73M2
GLUCOSE SERPL-MCNC: 98 MG/DL (ref 70–99)
HCT VFR BLD CALC: 37.6 % (ref 42–52)
HEMOGLOBIN: 12 GM/DL (ref 13.5–18)
LYMPHOCYTES ABSOLUTE: 2 K/CU MM
LYMPHOCYTES RELATIVE PERCENT: 33.1 % (ref 24–44)
MCH RBC QN AUTO: 29.3 PG (ref 27–31)
MCHC RBC AUTO-ENTMCNC: 31.9 % (ref 32–36)
MCV RBC AUTO: 91.7 FL (ref 78–100)
MONOCYTES ABSOLUTE: 0.5 K/CU MM
MONOCYTES RELATIVE PERCENT: 8.3 % (ref 0–4)
PDW BLD-RTO: 14.2 % (ref 11.7–14.9)
PLATELET # BLD: 178 K/CU MM (ref 140–440)
PMV BLD AUTO: 10.9 FL (ref 7.5–11.1)
POTASSIUM SERPL-SCNC: 4.5 MMOL/L (ref 3.5–5.1)
RBC # BLD: 4.1 M/CU MM (ref 4.6–6.2)
SEGMENTED NEUTROPHILS ABSOLUTE COUNT: 3.2 K/CU MM
SEGMENTED NEUTROPHILS RELATIVE PERCENT: 53.2 % (ref 36–66)
SODIUM BLD-SCNC: 138 MMOL/L (ref 135–145)
TOTAL PROTEIN: 6.3 GM/DL (ref 6.4–8.2)
WBC # BLD: 5.9 K/CU MM (ref 4–10.5)

## 2023-04-04 PROCEDURE — 84154 ASSAY OF PSA FREE: CPT

## 2023-04-04 PROCEDURE — 84153 ASSAY OF PSA TOTAL: CPT

## 2023-04-04 PROCEDURE — 36415 COLL VENOUS BLD VENIPUNCTURE: CPT

## 2023-04-04 PROCEDURE — 85025 COMPLETE CBC W/AUTO DIFF WBC: CPT

## 2023-04-04 PROCEDURE — 80053 COMPREHEN METABOLIC PANEL: CPT

## 2023-04-06 LAB
PSA FREE MFR SERPL: NORMAL %
PSA FREE SERPL-MCNC: <0.1 NG/ML
PSA SERPL IA-MCNC: <0.1 NG/ML (ref 0–4)

## 2023-04-13 ENCOUNTER — HOSPITAL ENCOUNTER (OUTPATIENT)
Dept: INFUSION THERAPY | Age: 58
Discharge: HOME | End: 2023-04-13
Payer: MEDICARE

## 2023-04-13 PROCEDURE — 99211 OFF/OP EST MAY X REQ PHY/QHP: CPT

## 2023-05-02 ENCOUNTER — HOSPITAL ENCOUNTER (OUTPATIENT)
Dept: INFUSION THERAPY | Age: 58
Discharge: HOME | End: 2023-05-02
Payer: MEDICARE

## 2023-05-02 DIAGNOSIS — C61 MALIGNANT NEOPLASM OF PROSTATE (HCC): Primary | ICD-10-CM

## 2023-05-02 PROCEDURE — 96372 THER/PROPH/DIAG INJ SC/IM: CPT

## 2023-05-02 PROCEDURE — 6360000002 HC RX W HCPCS: Performed by: INTERNAL MEDICINE

## 2023-05-02 RX ADMIN — LEUPROLIDE ACETATE 22.5 MG: KIT SUBCUTANEOUS at 14:16

## 2023-05-02 NOTE — PROGRESS NOTES
Pt here for Eligard injection. Pt has  no concerns at this time. Injection given SQ to RLQ.   Pt tolerated tx without incident left ambulatory declined discharge instructions

## 2023-06-01 DIAGNOSIS — C61 PROSTATE CANCER METASTATIC TO BONE (HCC): Chronic | ICD-10-CM

## 2023-06-01 DIAGNOSIS — C79.51 PROSTATE CANCER METASTATIC TO BONE (HCC): Chronic | ICD-10-CM

## 2023-06-01 RX ORDER — PREDNISONE 1 MG/1
5 TABLET ORAL DAILY
Qty: 30 TABLET | Refills: 12 | Status: SHIPPED | OUTPATIENT
Start: 2023-06-01

## 2023-07-12 ENCOUNTER — HOSPITAL ENCOUNTER (OUTPATIENT)
Dept: INFUSION THERAPY | Age: 58
Discharge: HOME | End: 2023-07-12
Payer: MEDICARE

## 2023-07-12 ENCOUNTER — HOSPITAL ENCOUNTER (OUTPATIENT)
Dept: WOMENS IMAGING | Age: 58
Discharge: HOME | End: 2023-07-12
Attending: INTERNAL MEDICINE
Payer: MEDICARE

## 2023-07-12 DIAGNOSIS — M81.8 ANDROGEN-INDUCED OSTEOPOROSIS: ICD-10-CM

## 2023-07-12 DIAGNOSIS — C61 PROSTATE CANCER METASTATIC TO BONE (HCC): ICD-10-CM

## 2023-07-12 DIAGNOSIS — T38.7X5A ANDROGEN-INDUCED OSTEOPOROSIS: ICD-10-CM

## 2023-07-12 DIAGNOSIS — C79.51 PROSTATE CANCER METASTATIC TO BONE (HCC): ICD-10-CM

## 2023-07-12 LAB
ALBUMIN SERPL-MCNC: 4.2 GM/DL (ref 3.4–5)
ALP BLD-CCNC: 65 IU/L (ref 40–128)
ALT SERPL-CCNC: 17 U/L (ref 10–40)
ANION GAP SERPL CALCULATED.3IONS-SCNC: 10 MMOL/L (ref 4–16)
AST SERPL-CCNC: 17 IU/L (ref 15–37)
BASOPHILS ABSOLUTE: 0 K/CU MM
BASOPHILS RELATIVE PERCENT: 0.3 % (ref 0–1)
BILIRUB SERPL-MCNC: 0.5 MG/DL (ref 0–1)
BUN SERPL-MCNC: 11 MG/DL (ref 6–23)
CALCIUM SERPL-MCNC: 9 MG/DL (ref 8.3–10.6)
CHLORIDE BLD-SCNC: 104 MMOL/L (ref 99–110)
CO2: 24 MMOL/L (ref 21–32)
CREAT SERPL-MCNC: 0.2 MG/DL (ref 0.9–1.3)
DIFFERENTIAL TYPE: ABNORMAL
EOSINOPHILS ABSOLUTE: 0.2 K/CU MM
EOSINOPHILS RELATIVE PERCENT: 2.7 % (ref 0–3)
GFR SERPL CREATININE-BSD FRML MDRD: >60 ML/MIN/1.73M2
GLUCOSE SERPL-MCNC: 104 MG/DL (ref 70–99)
HCT VFR BLD CALC: 38.9 % (ref 42–52)
HEMOGLOBIN: 12.4 GM/DL (ref 13.5–18)
LYMPHOCYTES ABSOLUTE: 1.7 K/CU MM
LYMPHOCYTES RELATIVE PERCENT: 21.3 % (ref 24–44)
MCH RBC QN AUTO: 29.6 PG (ref 27–31)
MCHC RBC AUTO-ENTMCNC: 31.9 % (ref 32–36)
MCV RBC AUTO: 92.8 FL (ref 78–100)
MONOCYTES ABSOLUTE: 0.5 K/CU MM
MONOCYTES RELATIVE PERCENT: 5.8 % (ref 0–4)
PDW BLD-RTO: 14 % (ref 11.7–14.9)
PLATELET # BLD: 164 K/CU MM (ref 140–440)
PMV BLD AUTO: 11.8 FL (ref 7.5–11.1)
POTASSIUM SERPL-SCNC: 4.3 MMOL/L (ref 3.5–5.1)
PSA ULTRASENSITIVE: 0.01 NG/ML (ref 0–4)
RBC # BLD: 4.19 M/CU MM (ref 4.6–6.2)
SEGMENTED NEUTROPHILS ABSOLUTE COUNT: 5.5 K/CU MM
SEGMENTED NEUTROPHILS RELATIVE PERCENT: 69.9 % (ref 36–66)
SODIUM BLD-SCNC: 138 MMOL/L (ref 135–145)
TOTAL PROTEIN: 6.4 GM/DL (ref 6.4–8.2)
WBC # BLD: 7.9 K/CU MM (ref 4–10.5)

## 2023-07-12 PROCEDURE — 36415 COLL VENOUS BLD VENIPUNCTURE: CPT

## 2023-07-12 PROCEDURE — 84153 ASSAY OF PSA TOTAL: CPT

## 2023-07-12 PROCEDURE — 80053 COMPREHEN METABOLIC PANEL: CPT

## 2023-07-12 PROCEDURE — 77081 DXA BONE DENSITY APPENDICULR: CPT

## 2023-07-12 PROCEDURE — 85025 COMPLETE CBC W/AUTO DIFF WBC: CPT

## 2023-07-18 ENCOUNTER — OFFICE VISIT (OUTPATIENT)
Dept: ONCOLOGY | Age: 58
End: 2023-07-18
Payer: MEDICARE

## 2023-07-18 ENCOUNTER — HOSPITAL ENCOUNTER (OUTPATIENT)
Dept: INFUSION THERAPY | Age: 58
Discharge: HOME | End: 2023-07-18
Attending: INTERNAL MEDICINE
Payer: MEDICARE

## 2023-07-18 VITALS
HEART RATE: 73 BPM | HEIGHT: 69 IN | SYSTOLIC BLOOD PRESSURE: 128 MMHG | BODY MASS INDEX: 24.66 KG/M2 | TEMPERATURE: 97.4 F | OXYGEN SATURATION: 99 % | DIASTOLIC BLOOD PRESSURE: 69 MMHG

## 2023-07-18 DIAGNOSIS — C79.51 PROSTATE CANCER METASTATIC TO BONE (HCC): Primary | ICD-10-CM

## 2023-07-18 DIAGNOSIS — C61 PROSTATE CANCER METASTATIC TO BONE (HCC): Primary | ICD-10-CM

## 2023-07-18 PROCEDURE — 3017F COLORECTAL CA SCREEN DOC REV: CPT | Performed by: INTERNAL MEDICINE

## 2023-07-18 PROCEDURE — G8427 DOCREV CUR MEDS BY ELIG CLIN: HCPCS | Performed by: INTERNAL MEDICINE

## 2023-07-18 PROCEDURE — G8420 CALC BMI NORM PARAMETERS: HCPCS | Performed by: INTERNAL MEDICINE

## 2023-07-18 PROCEDURE — 99211 OFF/OP EST MAY X REQ PHY/QHP: CPT

## 2023-07-18 PROCEDURE — 99213 OFFICE O/P EST LOW 20 MIN: CPT | Performed by: INTERNAL MEDICINE

## 2023-07-18 PROCEDURE — 1036F TOBACCO NON-USER: CPT | Performed by: INTERNAL MEDICINE

## 2023-07-18 ASSESSMENT — PATIENT HEALTH QUESTIONNAIRE - PHQ9
2. FEELING DOWN, DEPRESSED OR HOPELESS: 0
SUM OF ALL RESPONSES TO PHQ QUESTIONS 1-9: 0
SUM OF ALL RESPONSES TO PHQ QUESTIONS 1-9: 0
1. LITTLE INTEREST OR PLEASURE IN DOING THINGS: 0
SUM OF ALL RESPONSES TO PHQ QUESTIONS 1-9: 0
SUM OF ALL RESPONSES TO PHQ QUESTIONS 1-9: 0
SUM OF ALL RESPONSES TO PHQ9 QUESTIONS 1 & 2: 0

## 2023-07-18 NOTE — PROGRESS NOTES
MA Rooming Questions  Patient: Delmi Fagan  MRN: [id number]    Date: 7/18/2023        1. Do you have any new issues?   no         2. Do you need any refills on medications?    no    3. Have you had any imaging done since your last visit? yes - dexa scan    4. Have you been hospitalized or seen in the emergency room since your last visit here?   no    5. Did the patient have a depression screening completed today?  Yes    PHQ-9 Total Score: 0 (7/18/2023 10:47 AM)       PHQ-9 Given to (if applicable):               PHQ-9 Score (if applicable):                     [] Positive     [x]  Negative              Does question #9 need addressed (if applicable)                     [] Yes    []  No               Nael Leggett CMA

## 2023-07-21 RX ORDER — METHYLPREDNISOLONE SODIUM SUCCINATE 125 MG/2ML
125 INJECTION, POWDER, LYOPHILIZED, FOR SOLUTION INTRAMUSCULAR; INTRAVENOUS ONCE
OUTPATIENT
Start: 2023-07-25 | End: 2023-07-25

## 2023-07-21 RX ORDER — SODIUM CHLORIDE 0.9 % (FLUSH) 0.9 %
5-40 SYRINGE (ML) INJECTION PRN
OUTPATIENT
Start: 2023-07-25

## 2023-07-21 RX ORDER — WATER 1000 ML/1000ML
2.2 INJECTION, SOLUTION INTRAVENOUS ONCE
OUTPATIENT
Start: 2023-07-25 | End: 2023-07-25

## 2023-07-21 RX ORDER — HEPARIN SODIUM 100 [USP'U]/ML
500 INJECTION, SOLUTION INTRAVENOUS PRN
OUTPATIENT
Start: 2023-07-25

## 2023-07-21 RX ORDER — FAMOTIDINE 10 MG/ML
20 INJECTION, SOLUTION INTRAVENOUS ONCE
OUTPATIENT
Start: 2023-07-25 | End: 2023-07-25

## 2023-07-21 RX ORDER — DIPHENHYDRAMINE HYDROCHLORIDE 50 MG/ML
50 INJECTION INTRAMUSCULAR; INTRAVENOUS ONCE
OUTPATIENT
Start: 2023-07-25 | End: 2023-07-25

## 2023-07-21 RX ORDER — SODIUM CHLORIDE 9 MG/ML
25 INJECTION, SOLUTION INTRAVENOUS PRN
OUTPATIENT
Start: 2023-07-25

## 2023-07-21 RX ORDER — SODIUM CHLORIDE 9 MG/ML
INJECTION, SOLUTION INTRAVENOUS CONTINUOUS
OUTPATIENT
Start: 2023-07-25

## 2023-07-21 RX ORDER — EPINEPHRINE 1 MG/ML
0.3 INJECTION, SOLUTION, CONCENTRATE INTRAVENOUS PRN
OUTPATIENT
Start: 2023-07-25

## 2023-07-21 RX ORDER — SODIUM CHLORIDE 9 MG/ML
20 INJECTION, SOLUTION INTRAVENOUS ONCE
OUTPATIENT
Start: 2023-07-25 | End: 2023-07-25

## 2023-07-24 DIAGNOSIS — C61 PROSTATE CANCER METASTATIC TO BONE (HCC): Primary | ICD-10-CM

## 2023-07-24 DIAGNOSIS — C79.51 PROSTATE CANCER METASTATIC TO BONE (HCC): Primary | ICD-10-CM

## 2023-07-25 ENCOUNTER — HOSPITAL ENCOUNTER (OUTPATIENT)
Dept: INFUSION THERAPY | Age: 58
Discharge: HOME | End: 2023-07-25
Attending: INTERNAL MEDICINE
Payer: MEDICARE

## 2023-07-25 VITALS
TEMPERATURE: 97.8 F | DIASTOLIC BLOOD PRESSURE: 59 MMHG | OXYGEN SATURATION: 98 % | HEART RATE: 73 BPM | SYSTOLIC BLOOD PRESSURE: 102 MMHG

## 2023-07-25 DIAGNOSIS — C79.51 PROSTATE CANCER METASTATIC TO BONE (HCC): Primary | ICD-10-CM

## 2023-07-25 DIAGNOSIS — C61 PROSTATE CANCER METASTATIC TO BONE (HCC): Primary | ICD-10-CM

## 2023-07-25 DIAGNOSIS — C61 MALIGNANT NEOPLASM OF PROSTATE (HCC): ICD-10-CM

## 2023-07-25 LAB
ALBUMIN SERPL-MCNC: 4.4 GM/DL (ref 3.4–5)
ALP BLD-CCNC: 68 IU/L (ref 40–128)
ALT SERPL-CCNC: 17 U/L (ref 10–40)
ANION GAP SERPL CALCULATED.3IONS-SCNC: 12 MMOL/L (ref 4–16)
AST SERPL-CCNC: 20 IU/L (ref 15–37)
BILIRUB SERPL-MCNC: 0.7 MG/DL (ref 0–1)
BUN SERPL-MCNC: 11 MG/DL (ref 6–23)
CALCIUM SERPL-MCNC: 9.6 MG/DL (ref 8.3–10.6)
CHLORIDE BLD-SCNC: 100 MMOL/L (ref 99–110)
CO2: 27 MMOL/L (ref 21–32)
CREAT SERPL-MCNC: 0.2 MG/DL (ref 0.9–1.3)
EGFR, POC: NORMAL ML/MIN/1.73M2
GFR SERPL CREATININE-BSD FRML MDRD: >60 ML/MIN/1.73M2
GLUCOSE BLD-MCNC: 99 MG/DL (ref 70–99)
GLUCOSE SERPL-MCNC: 97 MG/DL (ref 70–99)
POC BUN: 10 MG/DL (ref 8–26)
POC CALCIUM: 1.19 MMOL/L (ref 1.12–1.32)
POC CHLORIDE: 104 MMOL/L (ref 98–109)
POC CO2: 27 MMOL/L (ref 21–32)
POC CREATININE: <0.3 MG/DL (ref 0.9–1.3)
POTASSIUM SERPL-SCNC: 4 MMOL/L (ref 3.5–4.5)
POTASSIUM SERPL-SCNC: 4.3 MMOL/L (ref 3.5–5.1)
SODIUM BLD-SCNC: 139 MMOL/L (ref 135–145)
SODIUM BLD-SCNC: 139 MMOL/L (ref 138–146)
SOURCE, BLOOD GAS: NORMAL
TOTAL PROTEIN: 6.5 GM/DL (ref 6.4–8.2)

## 2023-07-25 PROCEDURE — 6360000002 HC RX W HCPCS: Performed by: INTERNAL MEDICINE

## 2023-07-25 PROCEDURE — 96365 THER/PROPH/DIAG IV INF INIT: CPT

## 2023-07-25 PROCEDURE — 96402 CHEMO HORMON ANTINEOPL SQ/IM: CPT

## 2023-07-25 PROCEDURE — 80053 COMPREHEN METABOLIC PANEL: CPT

## 2023-07-25 PROCEDURE — 2580000003 HC RX 258: Performed by: INTERNAL MEDICINE

## 2023-07-25 RX ORDER — SODIUM CHLORIDE 9 MG/ML
20 INJECTION, SOLUTION INTRAVENOUS ONCE
Status: DISCONTINUED | OUTPATIENT
Start: 2023-07-25 | End: 2023-07-26 | Stop reason: HOSPADM

## 2023-07-25 RX ADMIN — LEUPROLIDE ACETATE 22.5 MG: KIT SUBCUTANEOUS at 15:08

## 2023-07-25 RX ADMIN — ZOLEDRONIC ACID 4 MG: 0.8 INJECTION, SOLUTION, CONCENTRATE INTRAVENOUS at 15:07

## 2023-07-25 RX ADMIN — SODIUM CHLORIDE 20 ML/HR: 9 INJECTION, SOLUTION INTRAVENOUS at 15:07

## 2023-07-25 NOTE — PROGRESS NOTES
Patient arrived to treatment suite for Eligard injection and Zometa infusion. PIV started in right arm and good blood return noted. No questions or concerns for the doctor at this time. Treatment approved and given - Eligard given subcutaneously in left abdomen, band-aid applied. Patient tolerated well. Left treatment suite with assistance in wheelchair. Discharge instructions declined.

## 2023-07-25 NOTE — PROGRESS NOTES
Patient Name: Clem Thorpe  Patient : 1965  Patient MRN: [id number]     Primary Oncologist: Elian Salmeron MD  Referring Provider: Vince West DO     Date of Service: 2023      Chief Complaint:   Chief Complaint   Patient presents with    Follow-up       He came in for follow-up visit. Patient Active Problem List:     Muscular dystrophy      Prostate cancer metastatic to bone      Anemia, unspecified     Malignant neoplasm of prostate     Family history of malignant neoplasm of breast     Encounter for nonprocreative genetic counseling     HPI:   Laurence Enciso is a pleasant 53-year-old  male patient was referred for this of the diagnosis of prostate cancer involving the bone, bladder and pelvic lymph node. Initially he presented with gross hematuria in 2019. He went to see family doctor and had an ultrasound of the bladder. 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 Invasive prostatic adenocarcinoma considered less likely. Sallyanne Chain PSA in 2019 was 29.51.  CT abdomen and pelvis on 2019 showed 3.7 cm intraluminal bladder mass likely representing transitional cell carcinoma rather than extension of the prostate. Chest x-ray showed no acute infiltrative process. May 14, 2019 he underwent cystoscopy with transurethral resection of the bladder tumor, 2 cm in size and transrectal ultrasound-guided prostate needle biopsy. Pathology report of left prostate biopsy showed adenocarcinoma Stacey score 4+4. Number cores positive out of 12. Proportion of prostatic tissue involved by tumor was 17%. Perineural invasion was seen. Pathology report of urinary bladder biopsy showed prostatic adenocarcinoma.   2019 MRI of pelvis showed large mass left peripheral zone extraprostatic extension including left seminal vesicle involvement and Problem: Skin/Tissue Integrity  Goal: Absence of new skin breakdown  Description: 1. Monitor for areas of redness and/or skin breakdown  2. Assess vascular access sites hourly  3. Every 4-6 hours minimum:  Change oxygen saturation probe site  4. Every 4-6 hours:  If on nasal continuous positive airway pressure, respiratory therapy assess nares and determine need for appliance change or resting period.   Outcome: Progressing     Problem: Safety - Adult  Goal: Free from fall injury  Outcome: Progressing  Flowsheets (Taken 7/25/2023 1715)  Free From Fall Injury: Instruct family/caregiver on patient safety     Problem: Pain  Goal: Verbalizes/displays adequate comfort level or baseline comfort level  Outcome: Progressing  Flowsheets (Taken 7/24/2023 1337)  Verbalizes/displays adequate comfort level or baseline comfort level:   Encourage patient to monitor pain and request assistance   Assess pain using appropriate pain scale   Administer analgesics based on type and severity of pain and evaluate response   Implement non-pharmacological measures as appropriate and evaluate response left neurovascular bundle involvement,  Multiple  enlarged lymph nodes, left pelvic side lymph node 1.0 x 2.0 cm, left pelvic sidewall lymph node 1.1 x 1.1 cm, right pelvic lymph node 1.5 cm. June 6, 2019 bone scan showed skeletal metastatic disease in the lower lumbar spine, sacrum and along the left sacroiliac joint. At present time he denies any specific bone pain. Due to the lack of visceral disease and high tumor burden, I offered androgen deprivation therapy. He refused to consider surgical castration. I started him on Casodex prior to androgen deprivation therapy. I scheduled for chemo class. We discussed the potential role of radium therapy if he has more symptoms related to the bone cancer. We discussed the role of provenge/vaccination. He understands well about his prognosis and the goal of the therapy which is palliative. He is agreeable to consider genetic counseling. He received Eligard injection by urologist.  He started zytiga 7/2019.    7/2019 CT chest showed nonspecific 0.8 cm semisolid noncalcified nodule int RUL. Follow up in 6 months was recommended. 8/2019 PSA 0.9. Bone scan and CT abdomen pelvis in October 2019 showed stable metastatic disease to the bone. 10/2019 PSA 0.2. January 2020 PSA 0.08. CT chest in January 2020 showed 0.7 cm cavitary lesion. He was found to have B-12 deficiency. He was on B-12 injection. He had lupron injection by urologist in January 2020. April 27, 2020 CT chest:  1. Stable 7 mm partially cavitary nodule within the right apex. Recommend follow-up CT in 3 months or per clinical protocol. 2. Mild emphysematous changes with no other acute cardiopulmonary findings. April 2020 PSA 0.04. Sister has given B-12 injection. CBC is stable. B-12 improved. CT chest 8/7/2020: Stable 7 mm pulmonary nodule seen within the right upper lobe recommend follow-up CT scan in 1 year's time. August 2020 PSA 0.01.   He had Lupron injection in September 2020.  Spouse has given B12 injection at home. 12/2/2020 PSA <0.1. Vitamin B-12 was 443, folate 18.1. CMP and CBC stable. He had lupron on March 9, 2021. He stopped taking the B12 injection. He had Lupron injection on 2021-06-01. PSA in July 2021 was less than 0.1. CBC and CMP stable. 7/6/2021 CT chest: unchanged tiny 7 mm right upper lobe solitary noncalcified pulmonary nodule back to January 2020, very doubtful significance. No history of smoking in the medical chart. Any follow-up of the chest at this point should be based on clinical factors. Bone density in July 2021 showed osteoporosis. He is on Zometa every 6 months. I recommended to take vitamin D and calcium. 10/2021 PSA less than 0.1. I will continue to monitor PSA every 3 to 6 months. October 2021 he has mild anemia. Anemic work-up was reviewed. CMP was stable for him. February 2022 he had Eligard injection. December 2021 PSA less than 0.1.  2/9/2022 bone scan:   New focus of right anterior 3rd rib activity, which can be due to healing rib fracture or metastasis in this patient with history of osseous metastatic disease. Suggest correlation with PSA and continued attention on follow-up. Previously demonstrated focal activity at L5 has resolved. The scintigraphic pattern is otherwise similar to prior. He claimed that he has fracture to 3rd rib cage. April 2022 CBC and CMP stable for him. PSA was <0.1. He has been adherent to his Zytiga and prednisone. July 2022 hemoglobin 12. PSA was less than 0.1. He received Lupron injection and zoledronic acid infusion in July 2022.    7/2022 CMP grossly stable for him. Hg 12, MCV 89.7, plt 131. CT CAP 8/2022:  Stable subtle sclerosis in S1-L3 that may represent treated osseous metastasis. No new osseous metastatic disease or pathologic fracture. Stable right apical 8 mm noncalcified pulmonary nodule unchanged since 2020.   Given stability compared to remote prior examinations this may represent sequela of prior inflammation. This is unlikely to represent metastatic disease. No definite evidence of new metastatic disease in the chest, abdomen or pelvis. 1/4/2023 CMP grossly stable. B-12 340, folate 9.3. TSH and T3 wnl. Hg 12.6, MCV 90.4. Ferritin 308, TIBC 298. PSA < 0.1.  1/18/2023 zometa infusion. No jaw necrosis. He has itching rash due to prednisone. We discussed about switching zytiga to [facility] and he refused. We discussed about potential break of zytiga and zometa. 2/20/2023 he came in for a follow up visit  1/4/2023 PSA < 0.1.  1/18/2023 CMP wnl.  2/14/2023 bone scan: Activity localizing at L3 and S1, compatible with osseous metastatic disease given appearance on prior CT. Multifocal degenerative changes are otherwise favored as above. He will continue with ADT, zytiga and zometa. No acute distress. He is on the wheelchair. Denied any nausea, vomiting or diarrhea. No fever or chills. No chest pain, shortness of breath or palpitation. No headache or dizzy spell. No specific bone pain. No melena or hematochezia. Denied any dysuria or hematuria. He had a Covid vaccine. Past Medical History  Prostate cancer Nickerson score 8, Bronson's muscular dystrophy. Surgical History  Colonoscopy and extirpation of lesion of colon     Social History  He smoked 1 to 2 pack/day for more than 20 years and quit on August 22, 2012. He denies any alcohol or illicit drug use. He has twin sons. Family History  Father: Colorectal cancer  Mother: Breast cancer  Grandfather - Maternal (2nd Degree): Lung cancer  Grandmother - Maternal (2nd Degree): Lung cancer.      Current Therapy  Abiraterone + Prednisone + Leuprolide Q3M Cycle Length: 84 Number Cycles: 8 Start: C1D1 on 07/17/2019 Assoc Dx: Prostate cancer LOT: Primary Treatment Stage: JOSE Treatment Intent: [id number]/30/2020 C1 D1  Leuprolide IM (3 month), 22.5 mg  Prednisone Oral, 5 mg  Abiraterone Oral, 1000 mg qday    Review of Systems: \"Per interval history; otherwise 10 point ROS is negative. \"    Vital Signs:  /70 (Site: Left Upper Arm, Position: Sitting, Cuff Size: Medium Adult)   Pulse 80   Temp 97.4 °F (36.3 °C) (Infrared)   Resp 18   Ht 5' 9\" (1.753 m)   SpO2 98%   BMI 24.66 kg/m²     Physical Exam:  CONSTITUTIONAL: awake, alert, cooperative, no apparent distress   EYES: sclera clear and + pallor. Pupils equal and round. ENT: Normocephalic, without obvious abnormality, atraumatic  NECK: supple, symmetrical.  No JVD or goiter. HEMATOLOGIC/LYMPHATIC: no cervical, supraclavicular or axillary lymphadenopathy   LUNGS: no increased work of breathing and clear to auscultation bilaterally  CARDIOVASCULAR: regular rate and rhythm, normal S1 and S2, no murmur   ABDOMEN: normal bowel sound, soft, non-distended, non-tender, no masses palpated, no rebound or guarding. MUSCULOSKELETAL: decreased strength to LE. He is on wheelchair. NEUROLOGIC:  Decreased power due to muscular dystrophy. CN II - XII grossly intact. SKIN: No jaundice. No ecchymosis. Papular rash to back. EXTREMITIES: no LE edema. No cyanosis.  Homans negative    Labs:  Hematology:  Lab Results   Component Value Date    WBC 8.5 01/04/2023    RBC 4.27 (L) 01/04/2023    HGB 12.6 (L) 01/04/2023    HCT 38.6 (L) 01/04/2023    MCV 90.4 01/04/2023    MCH 29.5 01/04/2023    MCHC 32.6 01/04/2023    RDW 14.5 01/04/2023     01/04/2023    MPV 10.7 01/04/2023    BANDSPCT 2 (L) 01/21/2020    SEGSPCT 75.3 (H) 01/04/2023    EOSRELPCT 3.3 (H) 01/04/2023    BASOPCT 0.2 01/04/2023    LYMPHOPCT 16.1 (L) 01/04/2023    MONOPCT 5.1 (H) 01/04/2023    BANDABS 0.10 01/21/2020    SEGSABS 6.4 01/04/2023    EOSABS 0.3 01/04/2023    BASOSABS 0.0 01/04/2023    LYMPHSABS 1.4 01/04/2023    MONOSABS 0.4 01/04/2023    DIFFTYPE AUTOMATED DIFFERENTIAL 01/04/2023    ANISOCYTOSIS 1+ 10/30/2019    POLYCHROM 1+ 01/21/2020    WBCMORP OCCASIONAL  ATYPICAL LYMPH(S) NOTED 01/21/2020    PLTM FEW 04/27/2020     Chemistry:  Lab Results   Component Value Date     01/18/2023    K 4.2 01/18/2023     01/18/2023    CO2 24 01/18/2023    BUN 11 01/18/2023    CREATININE 0.2 (L) 01/18/2023    GLUCOSE 95 01/18/2023    CALCIUM 9.3 01/18/2023    PROT 6.6 01/18/2023    LABALBU 4.6 01/18/2023    BILITOT 0.3 01/18/2023    ALKPHOS 62 01/18/2023    AST 19 01/18/2023    ALT 18 01/18/2023    LABGLOM >60 01/18/2023    GFRAA >60 07/26/2022    POCCA 1.18 01/18/2023    POCGLU 107 (H) 01/18/2023     Lab Results   Component Value Date     04/30/2011     Lab Results   Component Value Date    TSHHS 0.798 01/04/2023     Immunology:  Lab Results   Component Value Date    PROT 6.6 01/18/2023     Anemia Panel:  Lab Results   Component Value Date    QXAHHMEO14 340.1 01/04/2023    FOLATE 9.3 01/04/2023     Tumor Markers:  Lab Results   Component Value Date    PSA <0.1 01/04/2023 11/16/2021 Ferritin: 359 Iron: 54 (L) TIBC: 351 Transferrin %: 15  Folate: 10.1  Vitamin B-12: 343.9    Observations:  No data recorded     Assessment & Plan:   1. He had treatment naïve metastatic prostate cancer with involvement of the bone, baldder and pelvic lymph node. We went over NCCN guideline. Due to the lack of visceral disease, I offered Lupron and Zytiga plus prednisone. He understand the palliative goal of the therapy. I plan to have follow-up imaging study and PSA in 3 months. Urologist started him on Eligard. He started zytiga and prednisone in July 2019. PSA in August 2019 was 0.9. PSA in October 2019 was 0.2. CT abdomen and pelvis and bone scan in October 2019 showed stable findings. CT chest in January 2020 showed 0.7 cm cavitary lesion. PSA in January 2020 was 0.08. PSA in April 2020 was 0.08. Labs in August 2020 were reviewed. PSA in July 2021 was less than 0.1. He has been adherent to Zytiga and prednisone. He wanted to continue with Lupron every 6 months.   PSA in October 2021 was less than 0.1.    I will check PSA every 3 - 6 months.   Bone scan in February 2022 was reviewed. PSA in April 2022 was <0.1.  He has been adherent to Zytiga and prednisone.  He had bone and Zometa in July 2022.  PSA in July 2022 was less than 0.1.    1/4/2023 CMP grossly stable. B-12 340, folate 9.3. TSH and T3 wnl. Hg 12.6, MCV 90.4. Ferritin 308, TIBC 298. PSA < 0.1.  1/18/2023 zometa infusion. No jaw necrosis.  He has itching rash due to prednisone. We discussed about switching zytiga to Xtandi and he refused.  1/4/2023 PSA < 0.1.  1/18/2023 CMP wnl.  2/14/2023 bone scan:   Activity localizing at L3 and S1, compatible with osseous metastatic disease given appearance on prior CT.   Multifocal degenerative changes are otherwise favored as above.  He will continue with ADT, zytiga and zometa. He may consider to switch zytiga to xtandi.    2.  CT chest in July 2019 showed nonspecific 0.8 cm RUL nodule.  CT CAP 8/2022:  Stable subtle sclerosis in S1-L3 that may represent treated osseous metastasis.  No new osseous metastatic disease or pathologic fracture.   Stable right apical 8 mm noncalcified pulmonary nodule unchanged since 2020.  Given stability compared to remote prior examinations this may represent sequela of prior inflammation.  This is unlikely to represent metastatic disease. No definite evidence of new metastatic disease in the chest, abdomen or pelvis.    3. He has Bronson's muscular dystrophy. I referred him to see Genetic counselor- No pathogenic mutations noted.    4. He has mid anemia. He has B-12 deficiency. Spouse has given B-12 injection monthly.  B12 in June 2021 was 429.7.    1/4/2023 CMP grossly stable. B-12 340, folate 9.3. TSH and T3 wnl. Hg 12.6, MCV 90.4. Ferritin 308, TIBC 298.   Will monitor CBC.    5.  Bone density test 6/8/2021:.  The patient's bone mineral density is in the osteoporosis range.  The fracture risk is high.  He is on Zometa every 6 months. I recommend to take vitamin D and calcium.   He denied any jaw necrosis. 6/2023 due for DEXA scan. 6.  He has rash to the upper extremities and lower back. I recommend to follow-up with dermatologist.  He is on Claritin. Discussed about healthy lifestyle and diet. He had Covid vaccine. Return to clinic in 3 months or sooner. All of his question has been answered for today. Recent imaging and labs were reviewed and discussed with the patient.

## 2023-08-03 ENCOUNTER — TELEPHONE (OUTPATIENT)
Dept: ONCOLOGY | Age: 58
End: 2023-08-03

## 2023-08-03 NOTE — TELEPHONE ENCOUNTER
8/3/23 - spoke w/ pt for the 8/22/23 Bone scan at [facility] arrival of 10:00 am for the injection and 2:15 for the scan.

## 2023-08-07 DIAGNOSIS — C61 MALIGNANT NEOPLASM OF PROSTATE (HCC): ICD-10-CM

## 2023-08-07 DIAGNOSIS — C61 PROSTATE CANCER METASTATIC TO BONE (HCC): ICD-10-CM

## 2023-08-07 DIAGNOSIS — C79.51 PROSTATE CANCER METASTATIC TO BONE (HCC): ICD-10-CM

## 2023-08-07 RX ORDER — ABIRATERONE ACETATE 250 MG/1
1000 TABLET ORAL DAILY
Qty: 120 TABLET | Refills: 3 | Status: ACTIVE | OUTPATIENT
Start: 2023-08-07

## 2023-08-07 NOTE — PROGRESS NOTES
Received VM from [facility] needing refill on abiraterone 250 mg. RX reordered and e-scribed to [facility] per physician order with below instructions:     Abiraterone acetate (zytiga) 250 mg - Take 4 tablets (1,000 mg) by mouth daily # 120

## 2023-08-19 DIAGNOSIS — C61 PROSTATE CANCER METASTATIC TO BONE (HCC): ICD-10-CM

## 2023-08-19 DIAGNOSIS — C61 MALIGNANT NEOPLASM OF PROSTATE (HCC): ICD-10-CM

## 2023-08-19 DIAGNOSIS — C79.51 PROSTATE CANCER METASTATIC TO BONE (HCC): ICD-10-CM

## 2023-08-21 RX ORDER — ABIRATERONE ACETATE 250 MG/1
1000 TABLET ORAL DAILY
Qty: 120 TABLET | Refills: 3 | OUTPATIENT
Start: 2023-08-21

## 2023-08-22 ENCOUNTER — HOSPITAL ENCOUNTER (OUTPATIENT)
Dept: NUCLEAR MEDICINE | Age: 58
Discharge: HOME | End: 2023-08-22
Attending: INTERNAL MEDICINE
Payer: MEDICARE

## 2023-08-22 DIAGNOSIS — C79.51 PROSTATE CANCER METASTATIC TO BONE (HCC): ICD-10-CM

## 2023-08-22 DIAGNOSIS — C61 PROSTATE CANCER METASTATIC TO BONE (HCC): ICD-10-CM

## 2023-08-22 PROCEDURE — 78306 BONE IMAGING WHOLE BODY: CPT | Performed by: INTERNAL MEDICINE

## 2023-08-22 PROCEDURE — A9503 TC99M MEDRONATE: HCPCS | Performed by: INTERNAL MEDICINE

## 2023-08-22 PROCEDURE — 3430000000 HC RX DIAGNOSTIC RADIOPHARMACEUTICAL: Performed by: INTERNAL MEDICINE

## 2023-08-22 RX ORDER — TC 99M MEDRONATE 20 MG/10ML
26.5 INJECTION, POWDER, LYOPHILIZED, FOR SOLUTION INTRAVENOUS
Status: COMPLETED | OUTPATIENT
Start: 2023-08-22 | End: 2023-08-22

## 2023-08-22 RX ADMIN — TC 99M MEDRONATE 26.5 MILLICURIE: 20 INJECTION, POWDER, LYOPHILIZED, FOR SOLUTION INTRAVENOUS at 10:30

## 2023-08-30 ENCOUNTER — OFFICE VISIT (OUTPATIENT)
Dept: ONCOLOGY | Age: 58
End: 2023-08-30
Payer: MEDICARE

## 2023-08-30 ENCOUNTER — HOSPITAL ENCOUNTER (OUTPATIENT)
Dept: INFUSION THERAPY | Age: 58
Discharge: HOME | End: 2023-08-30
Attending: INTERNAL MEDICINE
Payer: MEDICARE

## 2023-08-30 VITALS
TEMPERATURE: 97.7 F | DIASTOLIC BLOOD PRESSURE: 68 MMHG | OXYGEN SATURATION: 99 % | HEART RATE: 71 BPM | SYSTOLIC BLOOD PRESSURE: 119 MMHG

## 2023-08-30 DIAGNOSIS — C61 MALIGNANT NEOPLASM OF PROSTATE (HCC): Primary | ICD-10-CM

## 2023-08-30 PROCEDURE — G8427 DOCREV CUR MEDS BY ELIG CLIN: HCPCS | Performed by: INTERNAL MEDICINE

## 2023-08-30 PROCEDURE — 1036F TOBACCO NON-USER: CPT | Performed by: INTERNAL MEDICINE

## 2023-08-30 PROCEDURE — 99211 OFF/OP EST MAY X REQ PHY/QHP: CPT

## 2023-08-30 PROCEDURE — 3017F COLORECTAL CA SCREEN DOC REV: CPT | Performed by: INTERNAL MEDICINE

## 2023-08-30 PROCEDURE — G8420 CALC BMI NORM PARAMETERS: HCPCS | Performed by: INTERNAL MEDICINE

## 2023-08-30 PROCEDURE — 99213 OFFICE O/P EST LOW 20 MIN: CPT | Performed by: INTERNAL MEDICINE

## 2023-08-30 NOTE — PROGRESS NOTES
MA Rooming Questions  Patient: Taylor Oliva  MRN: [id number]    Date: 8/30/2023        1. Do you have any new issues?   no         2. Do you need any refills on medications?    no    3. Have you had any imaging done since your last visit? yes - vbone scan    4. Have you been hospitalized or seen in the emergency room since your last visit here?   no    5. Did the patient have a depression screening completed today?  No    No data recorded     PHQ-9 Given to (if applicable):               PHQ-9 Score (if applicable):                     [] Positive     []  Negative              Does question #9 need addressed (if applicable)                     [] Yes    []  No               Leidy Hernandez CMA
was 0.2. CT abdomen and pelvis and bone scan in October 2019 showed stable findings. CT chest in January 2020 showed 0.7 cm cavitary lesion. PSA in January 2020 was 0.08. PSA in April 2020 was 0.08. Labs in August 2020 were reviewed. PSA in July 2021 was less than 0.1. He has been adherent to Zytiga and prednisone. He wanted to continue with Lupron every 6 months. PSA in October 2021 was less than 0.1. I will check PSA every 3 - 6 months. Bone scan in February 2022 was reviewed. PSA in April 2022 was <0.1. He has been adherent to Zytiga and prednisone. He had bone and Zometa in July 2022. PSA in July 2022 was less than 0.1.    1/4/2023 CMP grossly stable. B-12 340, folate 9.3. TSH and T3 wnl. Hg 12.6, MCV 90.4. Ferritin 308, TIBC 298. PSA < 0.1.  1/18/2023 zometa infusion. No jaw necrosis. He has itching rash due to prednisone. We discussed about switching zytiga to [facility] and he refused. 1/4/2023 PSA < 0.1.  1/18/2023 CMP wnl.  2/14/2023 bone scan: Activity localizing at L3 and S1, compatible with osseous metastatic disease given appearance on prior CT. Multifocal degenerative changes are otherwise favored as above. He will continue with ADT, zytiga and zometa. He may consider to switch zytiga to xtandi. He held zytiga since 2/2023.  4/2023 ALT 46. Hg 12, MCV 91.7. PSA< 0.1. He resumed zytiga again and talked to financial navigator. 7/12/2023 LFTs wnl. WBC 7.9, Hg 12.4, MCV 92.8, plat 164. PSA 0.01.    8/22/2023 bone scan: Osseous metastatic disease and L3 and S1, similar to prior. Multifocal degenerative change, without new findings to suggest progressive  metastatic disease. He will continue with zytiga and Lupron. He is agreeable to stop zometa. I will schedule labs and bone scan prior to next OV in 1/2024.    2.  CT chest in July 2019 showed nonspecific 0.8 cm RUL nodule. CT CAP 8/2022: Stable subtle sclerosis in S1-L3 that may represent treated osseous metastasis.   No new

## 2023-09-26 ENCOUNTER — OFFICE VISIT (OUTPATIENT)
Dept: FAMILY MEDICINE CLINIC | Age: 58
End: 2023-09-26
Payer: MEDICARE

## 2023-09-26 VITALS
DIASTOLIC BLOOD PRESSURE: 66 MMHG | OXYGEN SATURATION: 95 % | HEIGHT: 69 IN | BODY MASS INDEX: 24.66 KG/M2 | SYSTOLIC BLOOD PRESSURE: 134 MMHG | HEART RATE: 75 BPM

## 2023-09-26 DIAGNOSIS — R00.2 PALPITATIONS: ICD-10-CM

## 2023-09-26 DIAGNOSIS — Z23 ENCOUNTER FOR IMMUNIZATION: Primary | ICD-10-CM

## 2023-09-26 DIAGNOSIS — J84.9 INTERSTITIAL PULMONARY DISEASE, UNSPECIFIED (HCC): ICD-10-CM

## 2023-09-26 DIAGNOSIS — R60.0 EDEMA OF EXTREMITIES: ICD-10-CM

## 2023-09-26 DIAGNOSIS — G71.01 BECKER'S MUSCULAR DYSTROPHY (HCC): ICD-10-CM

## 2023-09-26 DIAGNOSIS — R94.31 ABNORMAL EKG: ICD-10-CM

## 2023-09-26 PROCEDURE — G0008 ADMIN INFLUENZA VIRUS VAC: HCPCS | Performed by: STUDENT IN AN ORGANIZED HEALTH CARE EDUCATION/TRAINING PROGRAM

## 2023-09-26 PROCEDURE — 90674 CCIIV4 VAC NO PRSV 0.5 ML IM: CPT | Performed by: STUDENT IN AN ORGANIZED HEALTH CARE EDUCATION/TRAINING PROGRAM

## 2023-09-26 PROCEDURE — 93000 ELECTROCARDIOGRAM COMPLETE: CPT | Performed by: STUDENT IN AN ORGANIZED HEALTH CARE EDUCATION/TRAINING PROGRAM

## 2023-09-26 PROCEDURE — 1036F TOBACCO NON-USER: CPT | Performed by: STUDENT IN AN ORGANIZED HEALTH CARE EDUCATION/TRAINING PROGRAM

## 2023-09-26 PROCEDURE — G8427 DOCREV CUR MEDS BY ELIG CLIN: HCPCS | Performed by: STUDENT IN AN ORGANIZED HEALTH CARE EDUCATION/TRAINING PROGRAM

## 2023-09-26 PROCEDURE — 3017F COLORECTAL CA SCREEN DOC REV: CPT | Performed by: STUDENT IN AN ORGANIZED HEALTH CARE EDUCATION/TRAINING PROGRAM

## 2023-09-26 PROCEDURE — G8420 CALC BMI NORM PARAMETERS: HCPCS | Performed by: STUDENT IN AN ORGANIZED HEALTH CARE EDUCATION/TRAINING PROGRAM

## 2023-09-26 PROCEDURE — 99214 OFFICE O/P EST MOD 30 MIN: CPT | Performed by: STUDENT IN AN ORGANIZED HEALTH CARE EDUCATION/TRAINING PROGRAM

## 2023-09-26 RX ORDER — TRIAMTERENE AND HYDROCHLOROTHIAZIDE 37.5; 25 MG/1; MG/1
1 TABLET ORAL DAILY PRN
Qty: 30 TABLET | Refills: 2 | Status: SHIPPED | OUTPATIENT
Start: 2023-09-26 | End: 2023-12-25

## 2023-09-26 RX ORDER — TRIAMTERENE AND HYDROCHLOROTHIAZIDE 37.5; 25 MG/1; MG/1
TABLET ORAL
Qty: 90 TABLET | OUTPATIENT
Start: 2023-09-26

## 2023-09-26 NOTE — PROGRESS NOTES
10/3/2023    Nessa     Chief Complaint   Patient presents with    Annual Exam       HPI  History was obtained from tasha. Chidi Bernardo is a 60 y.o. male with a PMHx as listed below who presents today for   Annual follow up. No acute complaitns. Hx. Mets prostate ca  following with Dr. Polo Hidalgo, Urology     Patient feels like this am had some 'jittering feeling' in heart    Hx. Muscular dystrophy, he has not been able to walk. As he has had worsened falls earlier this year    He uses triamterene sparingly   1. Encounter for immunization    2. Edema of extremities    3. Bronson's muscular dystrophy ([address])    4. Interstitial pulmonary disease, unspecified ([address])    5. Palpitations    6. Abnormal EKG             REVIEW OF SYMPTOMS    Review of Systems   Constitutional:  Negative for chills and fatigue. HENT:  Negative for congestion and sore throat. Respiratory:  Negative for shortness of breath and wheezing. Cardiovascular:  Negative for chest pain and palpitations. Gastrointestinal:  Negative for abdominal pain and nausea. Genitourinary:  Negative for frequency and urgency. Neurological:  Negative for light-headedness.        PAST MEDICAL HISTORY  Past Medical History:   Diagnosis Date    Cancer [facility]) Prostate to the bone    Mixed hyperlipidemia 10/24/2019       FAMILY HISTORY  Family History   Problem Relation Age of Onset    Hypertension Mother     Breast Cancer Mother     Cancer Mother     Colon Cancer Father     Cancer Father     No Known Problems Sister     Hypertension Maternal Grandmother     Cancer Maternal Grandmother        SOCIAL HISTORY  Social History     Socioeconomic History    Marital status: Single   Tobacco Use    Smoking status: Former     Packs/day: 1.50     Years: 28.00     Additional pack years: 0.00     Total pack years: 42.00     Types: Cigarettes     Start date: 1984     Quit date: 2012     Years since quittin.1    Smokeless tobacco: Never    Tobacco comments:

## 2023-09-28 ENCOUNTER — APPOINTMENT (OUTPATIENT)
Dept: GENERAL RADIOLOGY | Age: 58
DRG: 225 | End: 2023-09-28
Payer: MEDICARE

## 2023-09-28 ENCOUNTER — HOSPITAL ENCOUNTER (INPATIENT)
Age: 58
LOS: 1 days | Discharge: HOME | DRG: 225 | End: 2023-09-29
Attending: EMERGENCY MEDICINE | Admitting: SPECIALIST
Payer: MEDICARE

## 2023-09-28 DIAGNOSIS — I44.2 COMPLETE HEART BLOCK (HCC): ICD-10-CM

## 2023-09-28 DIAGNOSIS — R55 SYNCOPE AND COLLAPSE: Primary | ICD-10-CM

## 2023-09-28 PROBLEM — I45.9 HEART BLOCK: Status: ACTIVE | Noted: 2023-09-28

## 2023-09-28 LAB
ALBUMIN SERPL-MCNC: 4.3 GM/DL (ref 3.4–5)
ALP BLD-CCNC: 73 IU/L (ref 40–129)
ALT SERPL-CCNC: 26 U/L (ref 10–40)
ANION GAP SERPL CALCULATED.3IONS-SCNC: 12 MMOL/L (ref 4–16)
AST SERPL-CCNC: 28 IU/L (ref 15–37)
BASOPHILS ABSOLUTE: 0 K/CU MM
BASOPHILS RELATIVE PERCENT: 0.4 % (ref 0–1)
BILIRUB SERPL-MCNC: 0.8 MG/DL (ref 0–1)
BUN SERPL-MCNC: 17 MG/DL (ref 6–23)
CALCIUM SERPL-MCNC: 9.4 MG/DL (ref 8.3–10.6)
CHLORIDE BLD-SCNC: 101 MMOL/L (ref 99–110)
CO2: 24 MMOL/L (ref 21–32)
CREAT SERPL-MCNC: 0.3 MG/DL (ref 0.9–1.3)
DIFFERENTIAL TYPE: ABNORMAL
EKG ATRIAL RATE: 122 BPM
EKG ATRIAL RATE: 86 BPM
EKG DIAGNOSIS: NORMAL
EKG DIAGNOSIS: NORMAL
EKG P AXIS: 45 DEGREES
EKG P-R INTERVAL: 184 MS
EKG Q-T INTERVAL: 286 MS
EKG Q-T INTERVAL: 410 MS
EKG QRS DURATION: 104 MS
EKG QRS DURATION: 122 MS
EKG QTC CALCULATION (BAZETT): 410 MS
EKG QTC CALCULATION (BAZETT): 490 MS
EKG R AXIS: -48 DEGREES
EKG R AXIS: 53 DEGREES
EKG T AXIS: 252 DEGREES
EKG T AXIS: 86 DEGREES
EKG VENTRICULAR RATE: 124 BPM
EKG VENTRICULAR RATE: 86 BPM
EOSINOPHILS ABSOLUTE: 0.2 K/CU MM
EOSINOPHILS RELATIVE PERCENT: 2.6 % (ref 0–3)
GFR SERPL CREATININE-BSD FRML MDRD: >60 ML/MIN/1.73M2
GLUCOSE SERPL-MCNC: 154 MG/DL (ref 70–99)
HCT VFR BLD CALC: 39.8 % (ref 42–52)
HEMOGLOBIN: 12.7 GM/DL (ref 13.5–18)
IMMATURE NEUTROPHIL %: 0.4 % (ref 0–0.43)
INR BLD: 1 INDEX
LYMPHOCYTES ABSOLUTE: 1.1 K/CU MM
LYMPHOCYTES RELATIVE PERCENT: 13.4 % (ref 24–44)
MAGNESIUM: 2.1 MG/DL (ref 1.8–2.4)
MCH RBC QN AUTO: 29.6 PG (ref 27–31)
MCHC RBC AUTO-ENTMCNC: 31.9 % (ref 32–36)
MCV RBC AUTO: 92.8 FL (ref 78–100)
MONOCYTES ABSOLUTE: 0.5 K/CU MM
MONOCYTES RELATIVE PERCENT: 5.5 % (ref 0–4)
NUCLEATED RBC %: 0 %
PDW BLD-RTO: 13.9 % (ref 11.7–14.9)
PLATELET # BLD: 176 K/CU MM (ref 140–440)
PMV BLD AUTO: 12 FL (ref 7.5–11.1)
POTASSIUM SERPL-SCNC: 4.1 MMOL/L (ref 3.5–5.1)
PROTHROMBIN TIME: 13.6 SECONDS (ref 11.7–14.5)
RBC # BLD: 4.29 M/CU MM (ref 4.6–6.2)
SEGMENTED NEUTROPHILS ABSOLUTE COUNT: 6.4 K/CU MM
SEGMENTED NEUTROPHILS RELATIVE PERCENT: 77.7 % (ref 36–66)
SODIUM BLD-SCNC: 137 MMOL/L (ref 135–145)
T4 FREE SERPL-MCNC: 1.37 NG/DL (ref 0.9–1.8)
TOTAL IMMATURE NEUTOROPHIL: 0.03 K/CU MM
TOTAL NUCLEATED RBC: 0 K/CU MM
TOTAL PROTEIN: 6.7 GM/DL (ref 6.4–8.2)
TROPONIN T: <0.01 NG/ML
TSH SERPL DL<=0.005 MIU/L-ACNC: 1.07 UIU/ML (ref 0.27–4.2)
WBC # BLD: 8.2 K/CU MM (ref 4–10.5)

## 2023-09-28 PROCEDURE — 93010 ELECTROCARDIOGRAM REPORT: CPT | Performed by: INTERNAL MEDICINE

## 2023-09-28 PROCEDURE — C1900 LEAD, CORONARY VENOUS: HCPCS

## 2023-09-28 PROCEDURE — 0JH607Z INSERTION OF CARDIAC RESYNCHRONIZATION PACEMAKER PULSE GENERATOR INTO CHEST SUBCUTANEOUS TISSUE AND FASCIA, OPEN APPROACH: ICD-10-PCS | Performed by: INTERNAL MEDICINE

## 2023-09-28 PROCEDURE — 2500000003 HC RX 250 WO HCPCS

## 2023-09-28 PROCEDURE — 83735 ASSAY OF MAGNESIUM: CPT

## 2023-09-28 PROCEDURE — 6360000002 HC RX W HCPCS: Performed by: NURSE PRACTITIONER

## 2023-09-28 PROCEDURE — APPNB60 APP NON BILLABLE TIME 46-60 MINS: Performed by: NURSE PRACTITIONER

## 2023-09-28 PROCEDURE — 84443 ASSAY THYROID STIM HORMONE: CPT

## 2023-09-28 PROCEDURE — 93005 ELECTROCARDIOGRAM TRACING: CPT | Performed by: EMERGENCY MEDICINE

## 2023-09-28 PROCEDURE — 02HL3KZ INSERTION OF DEFIBRILLATOR LEAD INTO LEFT VENTRICLE, PERCUTANEOUS APPROACH: ICD-10-PCS | Performed by: INTERNAL MEDICINE

## 2023-09-28 PROCEDURE — 02HK0JZ INSERTION OF PACEMAKER LEAD INTO RIGHT VENTRICLE, OPEN APPROACH: ICD-10-PCS | Performed by: INTERNAL MEDICINE

## 2023-09-28 PROCEDURE — 6370000000 HC RX 637 (ALT 250 FOR IP): Performed by: INTERNAL MEDICINE

## 2023-09-28 PROCEDURE — C1892 INTRO/SHEATH,FIXED,PEEL-AWAY: HCPCS

## 2023-09-28 PROCEDURE — 93458 L HRT ARTERY/VENTRICLE ANGIO: CPT

## 2023-09-28 PROCEDURE — 84439 ASSAY OF FREE THYROXINE: CPT

## 2023-09-28 PROCEDURE — B2111ZZ FLUOROSCOPY OF MULTIPLE CORONARY ARTERIES USING LOW OSMOLAR CONTRAST: ICD-10-PCS | Performed by: INTERNAL MEDICINE

## 2023-09-28 PROCEDURE — 33225 L VENTRIC PACING LEAD ADD-ON: CPT | Performed by: INTERNAL MEDICINE

## 2023-09-28 PROCEDURE — 71045 X-RAY EXAM CHEST 1 VIEW: CPT

## 2023-09-28 PROCEDURE — 2580000003 HC RX 258

## 2023-09-28 PROCEDURE — 2580000003 HC RX 258: Performed by: NURSE PRACTITIONER

## 2023-09-28 PROCEDURE — 93306 TTE W/DOPPLER COMPLETE: CPT

## 2023-09-28 PROCEDURE — C1898 LEAD, PMKR, OTHER THAN TRANS: HCPCS

## 2023-09-28 PROCEDURE — 6360000002 HC RX W HCPCS

## 2023-09-28 PROCEDURE — 2000000000 HC ICU R&B

## 2023-09-28 PROCEDURE — 0JH609Z INSERTION OF CARDIAC RESYNCHRONIZATION DEFIBRILLATOR PULSE GENERATOR INTO CHEST SUBCUTANEOUS TISSUE AND FASCIA, OPEN APPROACH: ICD-10-PCS | Performed by: INTERNAL MEDICINE

## 2023-09-28 PROCEDURE — 2580000003 HC RX 258: Performed by: INTERNAL MEDICINE

## 2023-09-28 PROCEDURE — 80053 COMPREHEN METABOLIC PANEL: CPT

## 2023-09-28 PROCEDURE — 33225 L VENTRIC PACING LEAD ADD-ON: CPT

## 2023-09-28 PROCEDURE — 6370000000 HC RX 637 (ALT 250 FOR IP): Performed by: NURSE PRACTITIONER

## 2023-09-28 PROCEDURE — C1887 CATHETER, GUIDING: HCPCS

## 2023-09-28 PROCEDURE — C1769 GUIDE WIRE: HCPCS

## 2023-09-28 PROCEDURE — C1894 INTRO/SHEATH, NON-LASER: HCPCS

## 2023-09-28 PROCEDURE — 02H63KZ INSERTION OF DEFIBRILLATOR LEAD INTO RIGHT ATRIUM, PERCUTANEOUS APPROACH: ICD-10-PCS | Performed by: INTERNAL MEDICINE

## 2023-09-28 PROCEDURE — 33208 INSRT HEART PM ATRIAL & VENT: CPT

## 2023-09-28 PROCEDURE — 85025 COMPLETE CBC W/AUTO DIFF WBC: CPT

## 2023-09-28 PROCEDURE — 2720000010 HC SURG SUPPLY STERILE

## 2023-09-28 PROCEDURE — 94761 N-INVAS EAR/PLS OXIMETRY MLT: CPT

## 2023-09-28 PROCEDURE — 33208 INSRT HEART PM ATRIAL & VENT: CPT | Performed by: INTERNAL MEDICINE

## 2023-09-28 PROCEDURE — 02H60JZ INSERTION OF PACEMAKER LEAD INTO RIGHT ATRIUM, OPEN APPROACH: ICD-10-PCS | Performed by: INTERNAL MEDICINE

## 2023-09-28 PROCEDURE — 2709999900 HC NON-CHARGEABLE SUPPLY

## 2023-09-28 PROCEDURE — 02HK3KZ INSERTION OF DEFIBRILLATOR LEAD INTO RIGHT VENTRICLE, PERCUTANEOUS APPROACH: ICD-10-PCS | Performed by: INTERNAL MEDICINE

## 2023-09-28 PROCEDURE — 4A023N7 MEASUREMENT OF CARDIAC SAMPLING AND PRESSURE, LEFT HEART, PERCUTANEOUS APPROACH: ICD-10-PCS | Performed by: INTERNAL MEDICINE

## 2023-09-28 PROCEDURE — C2621 PMKR, OTHER THAN SING/DUAL: HCPCS

## 2023-09-28 PROCEDURE — 99222 1ST HOSP IP/OBS MODERATE 55: CPT | Performed by: INTERNAL MEDICINE

## 2023-09-28 PROCEDURE — 84484 ASSAY OF TROPONIN QUANT: CPT

## 2023-09-28 PROCEDURE — 99285 EMERGENCY DEPT VISIT HI MDM: CPT

## 2023-09-28 PROCEDURE — 6360000004 HC RX CONTRAST MEDICATION

## 2023-09-28 PROCEDURE — 85610 PROTHROMBIN TIME: CPT

## 2023-09-28 PROCEDURE — 99221 1ST HOSP IP/OBS SF/LOW 40: CPT | Performed by: INTERNAL MEDICINE

## 2023-09-28 PROCEDURE — 02HL0JZ INSERTION OF PACEMAKER LEAD INTO LEFT VENTRICLE, OPEN APPROACH: ICD-10-PCS | Performed by: INTERNAL MEDICINE

## 2023-09-28 RX ORDER — ACETAMINOPHEN 325 MG/1
650 TABLET ORAL EVERY 4 HOURS PRN
Status: DISCONTINUED | OUTPATIENT
Start: 2023-09-28 | End: 2023-09-29 | Stop reason: HOSPADM

## 2023-09-28 RX ORDER — ENALAPRIL MALEATE 5 MG/1
5 TABLET ORAL DAILY
Status: DISCONTINUED | OUTPATIENT
Start: 2023-09-28 | End: 2023-09-29 | Stop reason: HOSPADM

## 2023-09-28 RX ORDER — SODIUM CHLORIDE 0.9 % (FLUSH) 0.9 %
5-40 SYRINGE (ML) INJECTION EVERY 12 HOURS SCHEDULED
Status: DISCONTINUED | OUTPATIENT
Start: 2023-09-28 | End: 2023-09-29 | Stop reason: HOSPADM

## 2023-09-28 RX ORDER — ABIRATERONE ACETATE 250 MG/1
1000 TABLET ORAL DAILY
Status: DISCONTINUED | OUTPATIENT
Start: 2023-09-28 | End: 2023-09-29 | Stop reason: HOSPADM

## 2023-09-28 RX ORDER — PREDNISONE 5 MG/1
5 TABLET ORAL DAILY
Status: DISCONTINUED | OUTPATIENT
Start: 2023-09-28 | End: 2023-09-29 | Stop reason: HOSPADM

## 2023-09-28 RX ORDER — ONDANSETRON 2 MG/ML
4 INJECTION INTRAMUSCULAR; INTRAVENOUS EVERY 6 HOURS PRN
Status: DISCONTINUED | OUTPATIENT
Start: 2023-09-28 | End: 2023-09-29 | Stop reason: HOSPADM

## 2023-09-28 RX ORDER — ACETAMINOPHEN 325 MG/1
650 TABLET ORAL EVERY 6 HOURS PRN
Status: DISCONTINUED | OUTPATIENT
Start: 2023-09-28 | End: 2023-09-28 | Stop reason: SDUPTHER

## 2023-09-28 RX ORDER — TRIAMTERENE AND HYDROCHLOROTHIAZIDE 37.5; 25 MG/1; MG/1
1 TABLET ORAL DAILY PRN
Status: DISCONTINUED | OUTPATIENT
Start: 2023-09-28 | End: 2023-09-29 | Stop reason: HOSPADM

## 2023-09-28 RX ORDER — CALCIUM CARBONATE 500(1250)
500 TABLET ORAL EVERY OTHER DAY
Status: DISCONTINUED | OUTPATIENT
Start: 2023-09-28 | End: 2023-09-29 | Stop reason: HOSPADM

## 2023-09-28 RX ORDER — ONDANSETRON 4 MG/1
4 TABLET, ORALLY DISINTEGRATING ORAL EVERY 8 HOURS PRN
Status: DISCONTINUED | OUTPATIENT
Start: 2023-09-28 | End: 2023-09-29 | Stop reason: HOSPADM

## 2023-09-28 RX ORDER — SODIUM CHLORIDE 0.9 % (FLUSH) 0.9 %
5-40 SYRINGE (ML) INJECTION PRN
Status: DISCONTINUED | OUTPATIENT
Start: 2023-09-28 | End: 2023-09-29 | Stop reason: HOSPADM

## 2023-09-28 RX ORDER — SODIUM CHLORIDE 9 MG/ML
INJECTION, SOLUTION INTRAVENOUS PRN
Status: DISCONTINUED | OUTPATIENT
Start: 2023-09-28 | End: 2023-09-29 | Stop reason: HOSPADM

## 2023-09-28 RX ORDER — POLYETHYLENE GLYCOL 3350 17 G/17G
17 POWDER, FOR SOLUTION ORAL DAILY PRN
Status: DISCONTINUED | OUTPATIENT
Start: 2023-09-28 | End: 2023-09-29 | Stop reason: HOSPADM

## 2023-09-28 RX ORDER — ENOXAPARIN SODIUM 100 MG/ML
40 INJECTION SUBCUTANEOUS DAILY
Status: DISCONTINUED | OUTPATIENT
Start: 2023-09-28 | End: 2023-09-29 | Stop reason: HOSPADM

## 2023-09-28 RX ORDER — DIPHENHYDRAMINE HCL 25 MG
25 TABLET ORAL ONCE
Status: COMPLETED | OUTPATIENT
Start: 2023-09-29 | End: 2023-09-29

## 2023-09-28 RX ADMIN — CEFAZOLIN 2000 MG: 2 INJECTION, POWDER, FOR SOLUTION INTRAMUSCULAR; INTRAVENOUS at 23:21

## 2023-09-28 RX ADMIN — ACETAMINOPHEN 650 MG: 325 TABLET ORAL at 14:19

## 2023-09-28 RX ADMIN — CALCIUM 500 MG: 500 TABLET ORAL at 18:12

## 2023-09-28 RX ADMIN — ENOXAPARIN SODIUM 40 MG: 100 INJECTION SUBCUTANEOUS at 18:13

## 2023-09-28 RX ADMIN — SODIUM CHLORIDE, PRESERVATIVE FREE 10 ML: 5 INJECTION INTRAVENOUS at 20:22

## 2023-09-28 RX ADMIN — ENALAPRIL MALEATE 5 MG: 5 TABLET ORAL at 18:16

## 2023-09-28 RX ADMIN — PREDNISONE 5 MG: 5 TABLET ORAL at 18:12

## 2023-09-28 ASSESSMENT — PAIN SCALES - GENERAL: PAINLEVEL_OUTOF10: 8

## 2023-09-28 ASSESSMENT — ENCOUNTER SYMPTOMS
WHEEZING: 0
SORE THROAT: 0
ABDOMINAL DISTENTION: 0
VOMITING: 0
NAUSEA: 0
EYE PAIN: 0
BACK PAIN: 1
EYE DISCHARGE: 0
COLOR CHANGE: 0
ABDOMINAL PAIN: 0
EYE REDNESS: 0
EYE ITCHING: 0
CHEST TIGHTNESS: 0
COUGH: 0
CONSTIPATION: 0
DIARRHEA: 0
SHORTNESS OF BREATH: 0

## 2023-09-28 ASSESSMENT — PAIN DESCRIPTION - PAIN TYPE: TYPE: ACUTE PAIN

## 2023-09-28 ASSESSMENT — PAIN - FUNCTIONAL ASSESSMENT: PAIN_FUNCTIONAL_ASSESSMENT: ACTIVITIES ARE NOT PREVENTED

## 2023-09-28 ASSESSMENT — PAIN DESCRIPTION - FREQUENCY: FREQUENCY: CONTINUOUS

## 2023-09-28 ASSESSMENT — PAIN DESCRIPTION - LOCATION: LOCATION: BACK

## 2023-09-28 ASSESSMENT — PAIN DESCRIPTION - DESCRIPTORS: DESCRIPTORS: ACHING

## 2023-09-28 ASSESSMENT — PAIN DESCRIPTION - ORIENTATION: ORIENTATION: MID

## 2023-09-28 ASSESSMENT — PAIN DESCRIPTION - ONSET: ONSET: ON-GOING

## 2023-09-28 NOTE — ED PROVIDER NOTES
[address] ENCOUNTER        Pt Name: Carrie Elizalde  MRN: [id number]  [facility] 1965  Date of evaluation: 9/28/2023  Provider: SARAH Chicas - KHURRAM  PCP: Kevin Raygoza DO  Note Started: 9:38 AM EDT 9/28/23       I have seen and evaluated this patient with my supervising physician Rosio Roberts MD.      [address]       Chief Complaint   Patient presents with    Palpitations     X 2 days. Has been waking him up at night. Saw his PCP Tuesday and was referring him to see a cardiologist. Denies any chest pain     Loss of Consciousness     States he was sitting at his desk looking at the screen then all of a sudden he couldn't see. Denies falling. Shortness of Breath       HISTORY OF PRESENT ILLNESS: 1 or more Elements     History From: Patient    Limitations to history : None    Social Determinants Significantly Affecting Health : None    Chief Complaint: Palpitations with loss of consciousness    Carrie Elizalde is a 60 y.o. male who presents to ED stated he has been having palpitations where he feels like his he heart goes thump thump and then stops. This all started a week ago. He has had 3 episodes of loss of consciousness where he \"blacks out\". Patient denies falling states they happen when he is sitting. Last episode was this a.m. at 830 he was sitting in his chair when he felt his heart pound and then he lost consciousness unsure of how long he was out maybe 1 minute he stated. Denies loss of bowel bladder control during episode stated when he woke up he was at his normal mentation. Denies history of chest pain abdominal pain shortness of breath. Patient does have a history of muscular dystrophy he reports that he has not been able to walk he feels he is too weak and has had multiple falls. He is also having weakness in his bilateral upper arms and unable to lift himself out of a chair.   History of metastatic prostate cancer to his spine. Nursing Notes were all reviewed and agreed with or any disagreements were addressed in the HPI. REVIEW OF SYSTEMS :      Review of Systems    Positives and Pertinent negatives as per HPI. SURGICAL HISTORY     Past Surgical History:   Procedure Laterality Date    FACIAL RECONSTRUCTION SURGERY N/A     FRACTURE SURGERY  nose    NOSE SURGERY N/A     PROSTATE SURGERY  biopsy       CURRENTMEDICATIONS       Previous Medications    ABIRATERONE ACETATE (ZYTIGA) 250 MG TABLET    Take 4 tablets by mouth daily    CALCIUM CARBONATE (OSCAL) 500 MG TABS TABLET    Take 1 tablet by mouth every other day    LEUPROLIDE ACETATE (LUPRON IJ)    Inject as directed Indications: pt reports every 4 months 23 Patient reports he is due in October    PREDNISONE (DELTASONE) 5 MG TABLET    TAKE 1 TABLET BY MOUTH DAILY    TRIAMTERENE-HYDROCHLOROTHIAZIDE (MAXZIDE-25) 37.5-25 MG PER TABLET    Take 1 tablet by mouth daily as needed (feet swelling)    ZOLEDRONIC ACID (ZOMETA) 4 MG/5ML INJECTION    Infuse 5 mLs intravenously every 6 months 23 patient reports he is due in January       ALLERGIES     Bupropion and Prednisone    FAMILYHISTORY       Family History   Problem Relation Age of Onset    Hypertension Mother     Breast Cancer Mother     Cancer Mother     Colon Cancer Father     Cancer Father     No Known Problems Sister     Hypertension Maternal Grandmother     Cancer Maternal Grandmother         SOCIAL HISTORY       Social History     Tobacco Use    Smoking status: Former     Packs/day: 1.50     Years: 28.00     Additional pack years: 0.00     Total pack years: 42.00     Types: Cigarettes     Start date: 1984     Quit date: 2012     Years since quittin.1    Smokeless tobacco: Never    Tobacco comments:     I don't really don't remember when I started. Somewhere after [employer].    Vaping Use    Vaping Use: Never used   Substance Use Topics    Alcohol use:

## 2023-09-28 NOTE — CONSULTS
Electrophysiology Consult Note      Reason for consultation:     Chief complaint : ***    Referring physician:***      Primary care physician: Barber Romero DO      History of Present Illness:     Chief Complaint   Patient presents with    Palpitations     X 2 days. Has been waking him up at night. Saw his PCP Tuesday and was referring him to see a cardiologist. Denies any chest pain     Loss of Consciousness     States he was sitting at his desk looking at the screen then all of a sudden he couldn't see. Denies falling. Shortness of Breath         Location, Quality, severity, Timing, Duration, context, modifying factors, associated signs and symptoms      Pastmedical history:   Past Medical History:   Diagnosis Date    Cancer ([address]) Prostate to the bone    Mixed hyperlipidemia 10/24/2019       Surgical history :   Past Surgical History:   Procedure Laterality Date    FACIAL RECONSTRUCTION SURGERY N/A 1986    FRACTURE SURGERY  nose    NOSE SURGERY N/A 1986    PROSTATE SURGERY  biopsy       Family history:   Family History   Problem Relation Age of Onset    Hypertension Mother     Breast Cancer Mother     Cancer Mother     Colon Cancer Father     Cancer Father     No Known Problems Sister     Hypertension Maternal Grandmother     Cancer Maternal Grandmother        ***    Social history :  reports that he quit smoking about 11 years ago. His smoking use included cigarettes. He started smoking about 39 years ago. He has a 42.00 pack-year smoking history. He has never used smokeless tobacco. He reports that he does not drink alcohol and does not use drugs. Allergies   Allergen Reactions    Bupropion     Prednisone Hives       No current facility-administered medications on file prior to encounter.      Current Outpatient Medications on File Prior to Encounter   Medication Sig Dispense Refill    triamterene-hydroCHLOROthiazide (MAXZIDE-25) 37.5-25 MG per tablet Take 1

## 2023-09-28 NOTE — ED TRIAGE NOTES
Pt arrived via ems with c/o a syncopal episode and chest palpitations. Pt states he was sitting at his desk looking at the screen then all of a sudden he couldn't see anything for a  moment. Pt denies falling. Pt states they have been going on for two days now and he was seen by his PCP on Tuesday and referred to a cardiologist but has not seen him yet. Pt is a/o. Denies any chest pain but does have some sob.

## 2023-09-28 NOTE — OP NOTE
[facility]     Electrophysiology Procedure Note       Date of Procedure: 9/28/2023  Patient's Name: Carmen Pate  YOB: 1965   Medical Record Number: [id number]    Procedure Performed by: Kristie Pina MD    Procedures performed:    Insertion of right ventricular defibrillator lead under fluoroscopy. Insertion of right atrial lead under fluoroscopy  Insertion of left ventricular lead under fluoroscopy  Implantation of a Biv-Pacemaker. Electronic analysis of lead and device. Coronary sinus venogram  IV sedation     Indication of the procedure:    Carmen Pate is a 60 y.o. male with Muscular dystrophy, non ischemic cardiomyopathy, prostate cancer presented with loss of consciousness and complete heart block so will place BIV pacemaker and optimize medical therapy before ICD as patient has never been on medical therapy before. Details of procedure: The patient was brought to the electrophysiology laboratory in stable condition. The patient was in a fasting and non-sedated state. The risks, benefits and alternatives of the procedure were discussed with the patient. The risks including, but not limited to, the risks of vascular injury, bleeding, infection, device malfunction, lead dislodgement, radiation exposure, injury to cardiac and surrounding structures (including pneumothorax), stroke, myocardial infarction and death were discussed in detail. The patient opted to proceed with the device implantation. Written informed consent was signed and placed in the chart. Prophylactic antibiotic was given. Selective venography of the left upper extremity was done to rule out compression of the vein, and because of difficult access, which showed patent vein. The patient was prepped and draped in a sterile fashion. A timeout protocol was completed to identify the patient and the procedure being performed. IV sedation was provided with IV Versed, Fentanyl.  An complications. Patient was transported to the holding area in stable condition. DEVICE and LEADS information:              Plan:     The patient will have usual post-implant care including: chest x-ray, telemetry monitoring, antibiotic therapy and interrogation of the device.       Patient can be transferred to 3rd floor from Ep stand point  Recommend to start Goal directed medical therapy for non ischemic cardiomyopathy with ACEI and B-blocker      Electronically signed by Joe Harris MD on 9/28/2023 at 4:25 PM

## 2023-09-28 NOTE — ED PROVIDER NOTES
In brief, presents with multiple syncopal episodes. Most recent of which was this morning and he states that he was unconscious for maybe a minute. Did not bite his tongue and did not have any incontinence. Patient did see his primary care doctor for this earlier this week and was referred to cardiology but has not seen them yet. He is never seen a cardiologist in the past.  He had not had any chest pain. He denied any shortness of breath. No new numbness tingling or weakness. No change to bowel movements or urination. No changes to medications. Focused exam revealed     General appearance:  No acute distress. Skin:  Warm. Dry. Eye:  Extraocular movements intact. Ears, nose, mouth and throat:  Oral mucosa moist   Neck:  Trachea midline. Extremity:  No swelling. Normal ROM     Heart:  Regular rhythm and bradycardic rate  Perfusion:  intact distal pulses  Respiratory:  Respirations nonlabored. Neurological:  Alert and oriented times 3. Motor and sensory grossly intact, coordination intact          Psychiatric:  Appropriate  . ED course: Patient is clinically fairly well-appearing right now but initial EKG did show complete heart block. While in the room his monitor appears to show sinus rhythm again. Vital signs are reassuring currently. We will still discuss the case with cardiology but the patient will ultimately require hospitalization given the episode of complete heart block that was noticed here. Discussed the case with ICU attending for hospitalization here based off of cardiology recommendations. I personally saw the patient and independently provided 32 minutes of non-concurrent critical care of the total shared critical care time provided excluding separately billable procedures.     12 lead EKG per my interpretation:  Third-degree heart block  Rate: 50  QTc is  normal  There are nonspecific T wave changes  There are no ST concerning segment changes    Prior EKG to compare

## 2023-09-28 NOTE — H&P
V2.0  History and Physical      Name:  Delmi Fagan /Age/Sex: 1965  (60 y.o. male)   MRN & CSN:  [id number] & [id number] Encounter Date/Time: 2023 2:02 PM EDT   Location:  - PCP: [facility] Day: 1    Assessment and Plan:   Delmi Fagan is a 60 y.o. 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 Alexus Mesa         Syncope and collapse 2023 Yes    Heart block 2023 Yes     Problem List:  Complete Heart Block  Syncope  Palpitations  HFrEF  Anemia  Muscular Dystrophy  Prostate Cancer Metastatic to Bone    Neuro: Awake, Alert, Oriented. Cardio: Presented to ED after Syncopal Episode at home, was found to be in Complete Heart Block. Temporary Pacemaker placed per Cardiology. Echo today, EF 20% with global hypokinesis. Will start Enalapril. Cardiology/EP following with plans for permanent Pacemaker placement later this date. Pulm: No acute issues. On room air. GI: Will keep NPO for possible Pacemaker placement later this date. : Cr 0.3, which appears baseline. No acute issues. Monitor electrolytes and replete as needed. History of Prostate Cancer mets to bone, will continue home Prednisone and Zytiga. Heme: Hgb 12.7, which appears baseline; No sign of active bleeding. Continue to monitor H/H and transfuse as needed. DVT prophylaxis: SCDs, Lovenox. Endo: No acute issues. ID: WBC 8.2. No acute issues.     Disposition:   Current Living situation: Home  Expected Disposition: Return Home  Estimated D/C: 3-4 Days    Diet surgery (biopsy). Allergies: Allergies   Allergen Reactions    Bupropion     Prednisone Hives     Fam HX: family history includes Breast Cancer in his mother; Cancer in his father, maternal grandmother, and mother; Colon Cancer in his father; Hypertension in his maternal grandmother and mother; No Known Problems in his sister. Soc HX:   Social History     Socioeconomic History    Marital status: Single   Tobacco Use    Smoking status: Former     Packs/day: 1.50     Years: 28.00     Additional pack years: 0.00     Total pack years: 42.00     Types: Cigarettes     Start date: 1984     Quit date: 2012     Years since quittin.1    Smokeless tobacco: Never    Tobacco comments:     I don't really don't remember when I started. Somewhere after [employer].    Vaping Use    Vaping Use: Never used   Substance and Sexual Activity    Alcohol use: Never    Drug use: Never    Sexual activity: Never     Social Determinants of Health     Financial Resource Strain: Low Risk  (1/3/2023)    Overall Financial Resource Strain (CARDIA)     Difficulty of Paying Living Expenses: Not hard at all   Food Insecurity: No Food Insecurity (1/3/2023)    Hunger Vital Sign     Worried About Running Out of Food in the Last Year: Never true     Ran Out of Food in the Last Year: Never true   Physical Activity: Inactive (1/3/2023)    Exercise Vital Sign     Days of Exercise per Week: 0 days     Minutes of Exercise per Session: 0 min       Medications:   Medications:    abiraterone acetate  1,000 mg Oral Daily    calcium carbonate  500 mg Oral Every Other Day    predniSONE  5 mg Oral Daily    sodium chloride flush  5-40 mL IntraVENous 2 times per day    enoxaparin  40 mg SubCUTAneous Daily    enalapril  5 mg Oral Daily    sodium chloride flush  5-40 mL IntraVENous 2 times per day      Infusions:    sodium chloride      sodium chloride       PRN Meds: triamterene-hydroCHLOROthiazide, 1 tablet, Daily PRN  sodium chloride flush, 5-40 mL,

## 2023-09-28 NOTE — CARE COORDINATION
[facility] [place] reviewed at this time, criteria supports Inpatient Admission.  ANGELO,RN/CM

## 2023-09-28 NOTE — ED NOTES
1137 paged critical care provider     Mir Durham  09/28/23 1138    1139 critical care returned call      Mir Durham  09/28/23 1152

## 2023-09-28 NOTE — OP NOTE
Operative Note      Patient: Brad Kelly  YOB: 1965  MRN: [id number]          Procedure note  Left cardiac catheterization selective coronary angiography ventriculography      Temporary transvenous pacer for complete heart block    Indication  Cardiomyopathy  Complete heart block    Findings  Left main is large tubular vessel has no significant stenosis  The LAD circumflex and RCA has no significant stenosis  The EF is about 20%    Assessment and plan  We will discuss with EP for possible BiV pacer placement given that his EF is low and maximize medical therapy

## 2023-09-28 NOTE — ED NOTES
1003 paged Dr Salas File repaged Dr Simeon Sevilla  09/28/23 1016    1019 Dr Kelli Luciano returned call      Angi Jackman  09/28/23 1014

## 2023-09-28 NOTE — ED NOTES
Medication History  [facility]    Patient Name: Nico Dixon 1965     Medication history has been completed by: Derian Chne CPhT    Source(s) of information: patient and insurance claims     Primary Care Physician: Angela Esquivel DO     Pharmacy: [facility]    Allergies as of 09/28/2023 - Fully Reviewed 09/28/2023   Allergen Reaction Noted    Bupropion  03/31/2020    Prednisone Hives 09/08/2020        Prior to Admission medications    Medication Sig Start Date End Date Taking? Authorizing Provider   triamterene-hydroCHLOROthiazide (MAXZIDE-25) 37.5-25 MG per tablet Take 1 tablet by mouth daily as needed (feet swelling) 9/26/23 12/25/23  Brittanie Escobar DO   abiraterone acetate (ZYTIGA) 250 MG tablet Take 4 tablets by mouth daily 8/7/23   Bubba Molina MD   predniSONE (DELTASONE) 5 MG tablet TAKE 1 TABLET BY MOUTH DAILY 6/1/23   Bubba Molina MD   Leuprolide Acetate (LUPRON IJ) Inject as directed Indications: pt reports every 4 months 09/28/23 Patient reports he is due in October    Historical Provider, MD   calcium carbonate (OSCAL) 500 MG TABS tablet Take 1 tablet by mouth every other day    Historical Provider, MD   zoledronic acid (ZOMETA) 4 MG/5ML injection Infuse 5 mLs intravenously every 6 months 09/28/23 patient reports he is due in January    Historical Provider, MD     Comments:  Medication list reviewed with patient and insurance claims verified. Abiraterone acetate therapy updated patient reports he has not taken this medication today. Patient aware he will need to supply  Patient with an allergy to prednisone reports he uses OTC benadryl cream for relief.     To my knowledge the above medication history is accurate as of 9/28/2023 10:17 AM.   Derian Chen CPhT   9/28/2023 10:17 AM

## 2023-09-28 NOTE — H&P
I have personally seen and examined the patient independently. I have reviewed the patient's available data,including medical history and recent test results. Reviewed and discussed note as documented by ADOLFO. I agree with the physical exam findings, assessment and plan. My documented MDM is a substantive portion of the supervisory note. 40 minutes    Syncope  High-grade heart block  Cardiomyopathy newly diagnosed likely due to muscular dystrophy (Bronosn's muscular dystrophy)  Prostate cancer, metastatic (skeletal, bladder, pelvic lymph nodes)    Transvenous pacemaker, anticipate need to upgrade to biventricular ICD device in light of severely depressed cardiac function and underlying high-grade heart block  Continue daily systemic steroid  Continue other home medications for treatment of prostate cancer  Addition of medications for treatment of underlying cardiomyopathy per cardiology service  DVT, Ulcer prophylaxis    Complex decisions required for evaluation and management reviewed during critical care rounds.     Leonard Pop  [phone]

## 2023-09-28 NOTE — CONSULTS
CARDIOLOGY CONSULT NOTE   Reason for consultation:  heart block     Referring physician:  No admitting provider for patient encounter. Primary care physician: Paty Wyman DO      Dear  Dr. Itzel Layne admitting provider for patient encounter. Thanks for the consult. Chief Complaints :  Chief Complaint   Patient presents with    Palpitations     X 2 days. Has been waking him up at night. Saw his PCP Tuesday and was referring him to see a cardiologist. Denies any chest pain     Loss of Consciousness     States he was sitting at his desk looking at the screen then all of a sudden he couldn't see. Denies falling. Shortness of Breath        History of present illness:Scot is a 60 y. o.year old who presents passing out event at work he was sitting suddenly lost consciousness he has been having intermittent palpitations for last several days he was evaluated by PCP and was supposed to see cardiology as an outpatient he has history of prostate cancer as well as muscular dystrophy he is currently undergoing chemotherapy Zytiga for prostate cancer and also on prednisone  Denies any shortness of breath he denies any chest pain denies any weight loss    Past medical history:    has a past medical history of Cancer ([address]) and Mixed hyperlipidemia. Past surgical history:   has a past surgical history that includes Nose surgery (N/A, 1986); Facial reconstruction surgery (N/A, 1986); fracture surgery (nose); and Prostate surgery (biopsy). Social History:   reports that he quit smoking about 11 years ago. His smoking use included cigarettes. He started smoking about 39 years ago. He has a 42.00 pack-year smoking history. He has never used smokeless tobacco. He reports that he does not drink alcohol and does not use drugs.   Family history:   no family history of CAD, STROKE of DM at early age    Allergies   Allergen Reactions    Bupropion     Prednisone Hives       No current facility-administered medications for this patient somewhat limit evaluation. Lungs are clear. Cardiac and mediastinal silhouettes are within normal limits. No pneumothoraces. Bony structures appear intact. No acute abnormality. All labs, medications and tests reviewed by myself including data  from outside source , patient and available family . Continue all other medications of all above medical condition listed as is. Impression:  Active Problems:    Bronson's muscular dystrophy ([address])    Prostate cancer metastatic to bone (HCC)    Syncope and collapse  Resolved Problems:    * No resolved hospital problems. *      Assessment: 60 y. o.year old with PMH of  has a past medical history of Cancer ([address]) and Mixed hyperlipidemia. Plan and Recommendations:    His EKG showed episode of atrial flutter or atrial tach with complete heart block and A-V dissociation he will need a pacemaker. Admit to ICU  Check TSH, electrolytes, avoid rate lowering medication,  Echo will get EPS evaluation  DVT prophylaxis if no contraindication  6. Dyslipidemia: Lipid panel          Thank you  much for consult and giving us the opportunity in contributing in the care of this patient. Please feel free to call me for any questions.        Hal George MD, 9/28/2023 10:48 AM

## 2023-09-29 VITALS
BODY MASS INDEX: 28.08 KG/M2 | RESPIRATION RATE: 15 BRPM | WEIGHT: 189.6 LBS | OXYGEN SATURATION: 98 % | DIASTOLIC BLOOD PRESSURE: 63 MMHG | SYSTOLIC BLOOD PRESSURE: 113 MMHG | HEART RATE: 71 BPM | HEIGHT: 69 IN | TEMPERATURE: 98.3 F

## 2023-09-29 LAB
ANION GAP SERPL CALCULATED.3IONS-SCNC: 9 MMOL/L (ref 4–16)
BASOPHILS ABSOLUTE: 0 K/CU MM
BASOPHILS RELATIVE PERCENT: 0.5 % (ref 0–1)
BUN SERPL-MCNC: 12 MG/DL (ref 6–23)
CALCIUM SERPL-MCNC: 9.2 MG/DL (ref 8.3–10.6)
CHLORIDE BLD-SCNC: 108 MMOL/L (ref 99–110)
CO2: 25 MMOL/L (ref 21–32)
CREAT SERPL-MCNC: 0.3 MG/DL (ref 0.9–1.3)
DIFFERENTIAL TYPE: ABNORMAL
EOSINOPHILS ABSOLUTE: 0.2 K/CU MM
EOSINOPHILS RELATIVE PERCENT: 2.9 % (ref 0–3)
GFR SERPL CREATININE-BSD FRML MDRD: >60 ML/MIN/1.73M2
GLUCOSE SERPL-MCNC: 110 MG/DL (ref 70–99)
HCT VFR BLD CALC: 37.1 % (ref 42–52)
HEMOGLOBIN: 11.8 GM/DL (ref 13.5–18)
IMMATURE NEUTROPHIL %: 0.3 % (ref 0–0.43)
LYMPHOCYTES ABSOLUTE: 1.4 K/CU MM
LYMPHOCYTES RELATIVE PERCENT: 23.9 % (ref 24–44)
MAGNESIUM: 2.1 MG/DL (ref 1.8–2.4)
MCH RBC QN AUTO: 29.4 PG (ref 27–31)
MCHC RBC AUTO-ENTMCNC: 31.8 % (ref 32–36)
MCV RBC AUTO: 92.5 FL (ref 78–100)
MONOCYTES ABSOLUTE: 0.5 K/CU MM
MONOCYTES RELATIVE PERCENT: 7.8 % (ref 0–4)
NUCLEATED RBC %: 0 %
PDW BLD-RTO: 13.7 % (ref 11.7–14.9)
PHOSPHORUS: 3.8 MG/DL (ref 2.5–4.9)
PLATELET # BLD: 151 K/CU MM (ref 140–440)
PMV BLD AUTO: 11.8 FL (ref 7.5–11.1)
POTASSIUM SERPL-SCNC: 4.1 MMOL/L (ref 3.5–5.1)
RBC # BLD: 4.01 M/CU MM (ref 4.6–6.2)
SEGMENTED NEUTROPHILS ABSOLUTE COUNT: 3.8 K/CU MM
SEGMENTED NEUTROPHILS RELATIVE PERCENT: 64.6 % (ref 36–66)
SODIUM BLD-SCNC: 142 MMOL/L (ref 135–145)
TOTAL IMMATURE NEUTOROPHIL: 0.02 K/CU MM
TOTAL NUCLEATED RBC: 0 K/CU MM
WBC # BLD: 5.9 K/CU MM (ref 4–10.5)

## 2023-09-29 PROCEDURE — 85025 COMPLETE CBC W/AUTO DIFF WBC: CPT

## 2023-09-29 PROCEDURE — 94761 N-INVAS EAR/PLS OXIMETRY MLT: CPT

## 2023-09-29 PROCEDURE — 6370000000 HC RX 637 (ALT 250 FOR IP): Performed by: NURSE PRACTITIONER

## 2023-09-29 PROCEDURE — 83735 ASSAY OF MAGNESIUM: CPT

## 2023-09-29 PROCEDURE — 2580000003 HC RX 258: Performed by: NURSE PRACTITIONER

## 2023-09-29 PROCEDURE — 6360000002 HC RX W HCPCS: Performed by: NURSE PRACTITIONER

## 2023-09-29 PROCEDURE — 80048 BASIC METABOLIC PNL TOTAL CA: CPT

## 2023-09-29 PROCEDURE — 2580000003 HC RX 258: Performed by: INTERNAL MEDICINE

## 2023-09-29 PROCEDURE — 6370000000 HC RX 637 (ALT 250 FOR IP): Performed by: SPECIALIST

## 2023-09-29 PROCEDURE — 6370000000 HC RX 637 (ALT 250 FOR IP): Performed by: INTERNAL MEDICINE

## 2023-09-29 PROCEDURE — 84100 ASSAY OF PHOSPHORUS: CPT

## 2023-09-29 RX ORDER — ENALAPRIL MALEATE 5 MG/1
5 TABLET ORAL DAILY
Qty: 30 TABLET | Refills: 3 | Status: SHIPPED | OUTPATIENT
Start: 2023-09-30

## 2023-09-29 RX ADMIN — CEFAZOLIN 2000 MG: 2 INJECTION, POWDER, FOR SOLUTION INTRAMUSCULAR; INTRAVENOUS at 07:21

## 2023-09-29 RX ADMIN — SODIUM CHLORIDE, PRESERVATIVE FREE 10 ML: 5 INJECTION INTRAVENOUS at 08:15

## 2023-09-29 RX ADMIN — PREDNISONE 5 MG: 5 TABLET ORAL at 08:30

## 2023-09-29 RX ADMIN — SODIUM CHLORIDE, PRESERVATIVE FREE 10 ML: 5 INJECTION INTRAVENOUS at 08:16

## 2023-09-29 RX ADMIN — ENOXAPARIN SODIUM 40 MG: 100 INJECTION SUBCUTANEOUS at 08:30

## 2023-09-29 RX ADMIN — DIPHENHYDRAMINE HYDROCHLORIDE 25 MG: 25 TABLET ORAL at 00:06

## 2023-09-29 RX ADMIN — ENALAPRIL MALEATE 5 MG: 5 TABLET ORAL at 08:30

## 2023-09-29 ASSESSMENT — ENCOUNTER SYMPTOMS
EYES NEGATIVE: 1
GASTROINTESTINAL NEGATIVE: 1
RESPIRATORY NEGATIVE: 1

## 2023-09-29 ASSESSMENT — PAIN SCALES - GENERAL: PAINLEVEL_OUTOF10: 0

## 2023-09-29 NOTE — PROGRESS NOTES
Outpatient Pharmacy Progress Note for Meds-to-Beds    Total number of Prescriptions Filled: 1  The following medications were dispensed to the patient during the discharge process:  enalapril    Additional Documentation:  Patient picked-up the medication(s) in the OP Pharmacy      Thank you for letting us serve your patients.   [address]    [address] [place] [address], [address] [place] [address]    Phone: [phone]    Fax: [phone]

## 2023-09-29 NOTE — PLAN OF CARE
Problem: Discharge Planning  Goal: Discharge to home or other facility with appropriate resources  9/29/2023 1159 by Marianna Rogers RN  Outcome: Adequate for Discharge  9/29/2023 0457 by Roland Kilgore RN  Outcome: Progressing     Problem: Pain  Goal: Verbalizes/displays adequate comfort level or baseline comfort level  9/29/2023 1159 by Marianna Rogers RN  Outcome: Adequate for Discharge  9/29/2023 0457 by Roland Kilgore RN  Outcome: Progressing     Problem: Safety - Adult  Goal: Free from fall injury  9/29/2023 1159 by Marianna Rogers RN  Outcome: Adequate for Discharge  9/29/2023 0457 by Roland Kilgore RN  Outcome: Progressing     Problem: Skin/Tissue Integrity  Goal: Absence of new skin breakdown  Description: 1. Monitor for areas of redness and/or skin breakdown  2. Assess vascular access sites hourly  3. Every 4-6 hours minimum:  Change oxygen saturation probe site  4. Every 4-6 hours:  If on nasal continuous positive airway pressure, respiratory therapy assess nares and determine need for appliance change or resting period.   9/29/2023 1159 by Marianna Rogers RN  Outcome: Adequate for Discharge  9/29/2023 0457 by Roland Kilgore RN  Outcome: Progressing

## 2023-09-29 NOTE — PROGRESS NOTES
V2.0  [facility] [place]      Name:  Nessa Lucero /Age/Sex: 1965  (60 y.o. male)   MRN & CSN:  [id number] & [id number] Encounter Date/Time: 2023 7:54 AM EDT    Location:  -A PCP: [facility] Day: 2    Assessment and Plan:   Nessa Lucero is a 60 y.o. male with pmh of Anemia, Muscular Dystrophy, and Malignant Neoplasm of Prostate with Mets to Bone  who presents with Complete heart block ([address])      Plan: Third degree Heart Block: likely due to underlying muscular dystrophy, s/p biventricular ICD device placement  Syncope: likely 2/2 to abovee  HFrEF: newly diagnosed cardiomyopathy 2/2 brown's muscular dystrophy, continue daily systmic steroids  Muscular Dystrophy  Prostate Cancer Metastatic to Bone and pelvic Lymph nodes    Diet ADULT DIET; Regular   DVT Prophylaxis [x] Lovenox, []  Heparin, [] SCDs, [] Ambulation,  [] Eliquis, [] Xarelto  [] Coumadin   Code Status Full Code   Disposition From: home  Expected Disposition: TBD  Estimated Date of Discharge: 2 days  Patient requires continued admission due to Third degree heart block   Surrogate Decision Maker/ POA      Subjective:     Chief Complaint: Palpitations (X 2 days. Has been waking him up at night. Saw his PCP Tuesday and was referring him to see a cardiologist. Denies any chest pain ), Loss of Consciousness (States he was sitting at his desk looking at the screen then all of a sudden he couldn't see. Denies falling. ), and Shortness of Breath       Nessa Lucero is a 60 y.o. male who presents with Syncope, seen and examined at bedside, states that he feels much better after the pacemaker was implanted, no more lightheadedness dizziness shortness of breath on exertion. He is usually bedbound and uses a wheelchair to go around because of the muscular dystrophy his legs are very weak. Review of Systems:    Review of Systems as above      Objective:      Intake/Output Summary (Last 24 items external to the patient somewhat limit evaluation. Lungs are clear. Cardiac and mediastinal silhouettes are within normal limits. No pneumothoraces. Bony structures appear intact. No acute abnormality.        Electronically signed by Conor Aldrich MD on 9/29/2023 at 7:54 AM

## 2023-09-29 NOTE — DISCHARGE SUMMARY
Discharge Summary    Name:  Hector Anderson /Age/Sex: 1965  (60 y.o. male)   MRN & CSN:  [id number] & [id number] Admission Date/Time: 2023  8:51 AM   Attending:  Moe Young DO Discharging Physician: Moe Swain DO     Hospital Course:   Hector Anderson is a 60 y.o.  male  who presents with Complete heart block [facility]) admitted 2023    Syncope  High-grade heart block, status post biventricular PPM implantation 2023  Cardiomyopathy newly diagnosed likely due to muscular dystrophy (Bronson's muscular dystrophy)  Prostate cancer, metastatic (skeletal, bladder, pelvic lymph nodes)     Continue daily systemic steroid  Continue other home medications for treatment of prostate cancer  Addition of medications for treatment of underlying cardiomyopathy per cardiology service  DVT, Ulcer prophylaxis        E Cordasco  [phone]          The patient expressed appropriate understanding of and agreement with the discharge recommendations, medications, and plan.      Consults this admission:  IP CONSULT TO CARDIOLOGY  IP CONSULT TO ELECTROPHYSIOLOGY  IP CONSULT TO CRITICAL CARE      Discharge Instruction:   Handoff to PCP:     Follow up appointments: Cardiology next week  Primary care physician: 10 days    Diet:  regular diet   Activity: activity as tolerated  Disposition: Discharged to:   [x]Home, []C, []SNF, []Acute Rehab, []Hospice   Condition on discharge: Stable    Discharge Medications:        Medication List        START taking these medications      enalapril 5 MG tablet  Commonly known as: VASOTEC  Take 1 tablet by mouth daily  Start taking on: 2023            CONTINUE taking these medications      abiraterone acetate 250 MG tablet  Commonly known as: Zytiga  Take 4 tablets by mouth daily     calcium carbonate 500 MG Tabs tablet  Commonly known as: OSCAL     LUPRON IJ     predniSONE 5 MG tablet  Commonly known as: DELTASONE  TAKE 1 TABLET BY MOUTH DAILY

## 2023-09-29 NOTE — CARE COORDINATION
CM met with pt to initiate discharge planning. Pt is POD#1 BiV ICD placement. Pt is sitting up in the bed without support of the back of the bed. 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 Pt lives at home with his sister, brother in law and mother. Pt states that his sister does all meal prep, laundry, housekeeping. Pt has w/c, troy lift, BSC, Sliding board and chair that lifts up and down (vertically)--not a lift chair. Pt states that there are 1 -2 sm steps to enter the home and inside the home to Living Room and sister and brother in law can bump pt up and down the steps in his w/c. Pt states that he has never had HC or HHA. Pt states that MD runs in his family on his mother's . He has adapted over the years and he and family manage his needs without any difficulty. Pt anticipates a discharge to home and expresses no needs or concerns for discharge. 09/29/23 1159   Service Assessment   Patient Orientation Alert and Oriented   Cognition Alert   History Provided By Patient;Medical Record   Primary Caregiver Family   Accompanied By/Relationship N/A   Support Systems Other (Comment)  (sister and brother in law)   [address] is: Legal Next of [address] [place] [address]   PCP Verified by CM Yes   Current Functional Level Assistance with the following:;Housework; Shopping;Cooking;Mobility; Other (see comment)  (Uses w/c, transfer board)   Can patient return to prior living arrangement Yes   Ability to make needs known: Good   Family able to assist with home care needs: Yes   Would you like for me to discuss the discharge plan with any other family members/significant others, and if so, who?  Yes  (emergency contact/family as needed)   Financial Resources needmade Resources None   CM/SW Referral Other (see comment)  (Discharge planning assessment)   Social/Functional History   Lives With Other (comment)  (sister, brother in law and elderly mother)   Type of [address] [place] [address] Dr One level   [facility], transfer (sliding board).  a chair that rises up and down vertically ( not a lift chair))

## 2023-09-29 NOTE — PROGRESS NOTES
Patient left upper site no hematoma, no tenderness. Minimal bruise  CXR  - no acute abnormatities  Device interrogation - with in limits    Can be discharged with an outpatient follow up in 10 days.     Discharge instructions discussed with patient and are on chart

## 2023-09-29 NOTE — DISCHARGE INSTRUCTIONS
1. Avoid raising the arm above shoulder for 4 weeks  2. No driving for 10 days  3. No lifting more than 10 lbs with the left hand  4. Do not wet the area of surgery for 10 days  5. Follow up appointment with Doctor for wound check in 10 days  6. Notify Doctor if pain, fever, chills, swelling or bleeding in the surgical area  7. Please avoid sleeping on the surgical side. 8. Please do not allow anyone other than Dr Ro Parra staff to remove or redress the surgical site. If the dressing were to fall off or fall partially off before the 10 day follow up appointment, please call the office ASAP.

## 2023-09-30 ENCOUNTER — HOSPITAL ENCOUNTER (EMERGENCY)
Age: 58
Discharge: HOME | End: 2023-10-01
Payer: MEDICARE

## 2023-09-30 ENCOUNTER — APPOINTMENT (OUTPATIENT)
Dept: GENERAL RADIOLOGY | Age: 58
End: 2023-09-30
Payer: MEDICARE

## 2023-09-30 VITALS
DIASTOLIC BLOOD PRESSURE: 67 MMHG | TEMPERATURE: 98.4 F | OXYGEN SATURATION: 99 % | SYSTOLIC BLOOD PRESSURE: 122 MMHG | HEART RATE: 78 BPM | RESPIRATION RATE: 14 BRPM

## 2023-09-30 DIAGNOSIS — J18.9 PNEUMONIA DUE TO INFECTIOUS ORGANISM, UNSPECIFIED LATERALITY, UNSPECIFIED PART OF LUNG: Primary | ICD-10-CM

## 2023-09-30 LAB
ALBUMIN SERPL-MCNC: 4.1 GM/DL (ref 3.4–5)
ALP BLD-CCNC: 70 IU/L (ref 40–129)
ALT SERPL-CCNC: 20 U/L (ref 10–40)
ANION GAP SERPL CALCULATED.3IONS-SCNC: 12 MMOL/L (ref 4–16)
AST SERPL-CCNC: 30 IU/L (ref 15–37)
BASOPHILS ABSOLUTE: 0 K/CU MM
BASOPHILS RELATIVE PERCENT: 0.3 % (ref 0–1)
BILIRUB SERPL-MCNC: 0.8 MG/DL (ref 0–1)
BUN SERPL-MCNC: 11 MG/DL (ref 6–23)
CALCIUM SERPL-MCNC: 9.2 MG/DL (ref 8.3–10.6)
CHLORIDE BLD-SCNC: 103 MMOL/L (ref 99–110)
CO2: 23 MMOL/L (ref 21–32)
CREAT SERPL-MCNC: 0.3 MG/DL (ref 0.9–1.3)
DIFFERENTIAL TYPE: ABNORMAL
EOSINOPHILS ABSOLUTE: 0.4 K/CU MM
EOSINOPHILS RELATIVE PERCENT: 5 % (ref 0–3)
GFR SERPL CREATININE-BSD FRML MDRD: >60 ML/MIN/1.73M2
GLUCOSE SERPL-MCNC: 106 MG/DL (ref 70–99)
HCT VFR BLD CALC: 36.6 % (ref 42–52)
HEMOGLOBIN: 11.7 GM/DL (ref 13.5–18)
IMMATURE NEUTROPHIL %: 0.4 % (ref 0–0.43)
LYMPHOCYTES ABSOLUTE: 2 K/CU MM
LYMPHOCYTES RELATIVE PERCENT: 26 % (ref 24–44)
MCH RBC QN AUTO: 29.6 PG (ref 27–31)
MCHC RBC AUTO-ENTMCNC: 32 % (ref 32–36)
MCV RBC AUTO: 92.7 FL (ref 78–100)
MONOCYTES ABSOLUTE: 0.6 K/CU MM
MONOCYTES RELATIVE PERCENT: 7.7 % (ref 0–4)
NUCLEATED RBC %: 0 %
PDW BLD-RTO: 13.8 % (ref 11.7–14.9)
PLATELET # BLD: 154 K/CU MM (ref 140–440)
PMV BLD AUTO: 11.9 FL (ref 7.5–11.1)
POTASSIUM SERPL-SCNC: 4.1 MMOL/L (ref 3.5–5.1)
PRO-BNP: 1071 PG/ML
RBC # BLD: 3.95 M/CU MM (ref 4.6–6.2)
SEGMENTED NEUTROPHILS ABSOLUTE COUNT: 4.7 K/CU MM
SEGMENTED NEUTROPHILS RELATIVE PERCENT: 60.6 % (ref 36–66)
SODIUM BLD-SCNC: 138 MMOL/L (ref 135–145)
TOTAL IMMATURE NEUTOROPHIL: 0.03 K/CU MM
TOTAL NUCLEATED RBC: 0 K/CU MM
TOTAL PROTEIN: 6.4 GM/DL (ref 6.4–8.2)
TROPONIN T: <0.01 NG/ML
WBC # BLD: 7.8 K/CU MM (ref 4–10.5)

## 2023-09-30 PROCEDURE — 96366 THER/PROPH/DIAG IV INF ADDON: CPT

## 2023-09-30 PROCEDURE — 71045 X-RAY EXAM CHEST 1 VIEW: CPT

## 2023-09-30 PROCEDURE — 83880 ASSAY OF NATRIURETIC PEPTIDE: CPT

## 2023-09-30 PROCEDURE — 93005 ELECTROCARDIOGRAM TRACING: CPT | Performed by: PHYSICIAN ASSISTANT

## 2023-09-30 PROCEDURE — 99285 EMERGENCY DEPT VISIT HI MDM: CPT

## 2023-09-30 PROCEDURE — 96365 THER/PROPH/DIAG IV INF INIT: CPT

## 2023-09-30 PROCEDURE — 6370000000 HC RX 637 (ALT 250 FOR IP): Performed by: PHYSICIAN ASSISTANT

## 2023-09-30 PROCEDURE — 85025 COMPLETE CBC W/AUTO DIFF WBC: CPT

## 2023-09-30 PROCEDURE — 84484 ASSAY OF TROPONIN QUANT: CPT

## 2023-09-30 PROCEDURE — 80053 COMPREHEN METABOLIC PANEL: CPT

## 2023-09-30 RX ORDER — ASPIRIN 81 MG/1
324 TABLET, CHEWABLE ORAL ONCE
Status: COMPLETED | OUTPATIENT
Start: 2023-09-30 | End: 2023-09-30

## 2023-09-30 RX ORDER — TRAMADOL HYDROCHLORIDE 50 MG/1
50 TABLET ORAL ONCE
Status: COMPLETED | OUTPATIENT
Start: 2023-09-30 | End: 2023-09-30

## 2023-09-30 RX ADMIN — ASPIRIN 81 MG CHEWABLE TABLET 324 MG: 81 TABLET CHEWABLE at 23:08

## 2023-09-30 RX ADMIN — TRAMADOL HYDROCHLORIDE 50 MG: 50 TABLET, COATED ORAL at 23:08

## 2023-09-30 ASSESSMENT — PAIN DESCRIPTION - LOCATION: LOCATION: CHEST

## 2023-09-30 ASSESSMENT — PAIN SCALES - GENERAL: PAINLEVEL_OUTOF10: 6

## 2023-10-01 ENCOUNTER — APPOINTMENT (OUTPATIENT)
Dept: CT IMAGING | Age: 58
End: 2023-10-01
Payer: MEDICARE

## 2023-10-01 PROCEDURE — 71275 CT ANGIOGRAPHY CHEST: CPT

## 2023-10-01 PROCEDURE — 6360000002 HC RX W HCPCS: Performed by: PHYSICIAN ASSISTANT

## 2023-10-01 PROCEDURE — 6360000004 HC RX CONTRAST MEDICATION: Performed by: PHYSICIAN ASSISTANT

## 2023-10-01 PROCEDURE — 96366 THER/PROPH/DIAG IV INF ADDON: CPT

## 2023-10-01 PROCEDURE — 2580000003 HC RX 258: Performed by: PHYSICIAN ASSISTANT

## 2023-10-01 PROCEDURE — 96365 THER/PROPH/DIAG IV INF INIT: CPT

## 2023-10-01 RX ORDER — SODIUM CHLORIDE 0.9 % (FLUSH) 0.9 %
5-40 SYRINGE (ML) INJECTION 2 TIMES DAILY
Status: DISCONTINUED | OUTPATIENT
Start: 2023-10-01 | End: 2023-10-01 | Stop reason: HOSPADM

## 2023-10-01 RX ORDER — AZITHROMYCIN 250 MG/1
TABLET, FILM COATED ORAL
Qty: 1 PACKET | Refills: 0 | Status: SHIPPED | OUTPATIENT
Start: 2023-10-01 | End: 2023-10-05

## 2023-10-01 RX ADMIN — AZITHROMYCIN MONOHYDRATE 500 MG: 500 INJECTION, POWDER, LYOPHILIZED, FOR SOLUTION INTRAVENOUS at 01:27

## 2023-10-01 RX ADMIN — IOPAMIDOL 75 ML: 755 INJECTION, SOLUTION INTRAVENOUS at 00:25

## 2023-10-01 NOTE — ED TRIAGE NOTES
Pt had pacemaker placed on Thursday, pt states that since 1600 has been having substernal CP with breathing

## 2023-10-01 NOTE — ACP (ADVANCE CARE PLANNING)
Patient does not have any ACP documents/Medical Power of . LSW notes hospital will follow [place] Next of Kin hierarchy in the following descending order for priority:    Guardian  Spouse  Majority of adult Children  Parents  Majority of adult Siblings  Nearest Relative not described above    Per [place] Next of Kin hierarchy: Patients' adult child will be [address]

## 2023-10-01 NOTE — ED PROVIDER NOTES
for Exam: Pacemaker placement and rule out pneumothorax Additional signs and symptoms: none Relevant Medical/Surgical History: none FINDINGS: Cardiac silhouette is stable. Left-sided cardiac pacer device now in place. Interstitial changes of the lungs favored to reflect mild edema. No pleural effusion or pneumothorax. Osseous structures appear intact. 1. Left-sided cardiac pacer device now in place with no pneumothorax identified. 2. Suspect mild pulmonary edema. XR CHEST PORTABLE    Result Date: 9/28/2023  EXAMINATION: ONE XRAY VIEW OF THE CHEST 9/28/2023 9:22 am COMPARISON: CT chest 08/11/2022. HISTORY: ORDERING SYSTEM PROVIDED HISTORY: heart block TECHNOLOGIST PROVIDED HISTORY: Reason for exam:->heart block Reason for Exam: heart block FINDINGS: Overlying items external to the patient somewhat limit evaluation. Lungs are clear. Cardiac and mediastinal silhouettes are within normal limits. No pneumothoraces. Bony structures appear intact. No acute abnormality. MDM:     Problems Addressed:  1. Pneumonia due to infectious organism, unspecified laterality, unspecified part of lung        Interventions given this visit:   Orders Placed This Encounter   Medications    aspirin chewable tablet 324 mg    traMADol (ULTRAM) tablet 50 mg    sodium chloride flush 0.9 % injection 5-40 mL    iopamidol (ISOVUE-370) 76 % injection 75 mL    azithromycin (ZITHROMAX) 500 mg in sodium chloride 0.9 % 250 mL IVPB ([id number])     Order Specific Question:   Antimicrobial Indications     Answer:   Urinary Tract Infection    azithromycin (ZITHROMAX Z-VALENTIN) 250 MG tablet     Sig: Take 2 tablets (500 mg) on Day 1, and then take 1 tablet (250 mg) on days 2 through 5. Dispense:  1 packet     Refill:  0      Interventions listed are used to treat Problem list above    Images independently interpreted by me:  CT chest shows pulmonary congestion vs pneumonia. Patient presents today to the ED with chest pain.   Status

## 2023-10-02 ENCOUNTER — CARE COORDINATION (OUTPATIENT)
Dept: CASE MANAGEMENT | Age: 58
End: 2023-10-02

## 2023-10-02 ENCOUNTER — TELEPHONE (OUTPATIENT)
Dept: FAMILY MEDICINE CLINIC | Age: 58
End: 2023-10-02

## 2023-10-02 DIAGNOSIS — I44.2 COMPLETE HEART BLOCK (HCC): Primary | ICD-10-CM

## 2023-10-02 LAB
EKG ATRIAL RATE: 70 BPM
EKG DIAGNOSIS: NORMAL
EKG P AXIS: 59 DEGREES
EKG P-R INTERVAL: 154 MS
EKG Q-T INTERVAL: 448 MS
EKG QRS DURATION: 124 MS
EKG QTC CALCULATION (BAZETT): 483 MS
EKG R AXIS: 205 DEGREES
EKG T AXIS: 27 DEGREES
EKG VENTRICULAR RATE: 70 BPM

## 2023-10-02 PROCEDURE — 1111F DSCHRG MED/CURRENT MED MERGE: CPT | Performed by: STUDENT IN AN ORGANIZED HEALTH CARE EDUCATION/TRAINING PROGRAM

## 2023-10-02 PROCEDURE — 93010 ELECTROCARDIOGRAM REPORT: CPT | Performed by: INTERNAL MEDICINE

## 2023-10-02 NOTE — CARE COORDINATION
[address] [facility],[place] Care Transitions Initial Follow Up Call    Call within 2 business days of discharge: Yes    Patient Current Location:  Home: [address] [place] [address]    Care Transition Nurse contacted the patient by telephone to perform post hospital discharge assessment. Verified name and  with patient as identifiers. Provided introduction to self, and explanation of the Care Transition Nurse role. Patient: Regla Solomon Patient : 1965   MRN: [id number]  Reason for Admission: CHB s/p PPM 23, Pna  Discharge Date: 10/1/23 RARS: Readmission Risk Score: 16.5  Facility: [facility] 23-23; ED 23     Last Discharge [address]       Date Complaint Diagnosis Description Type Department Provider    23 Chest Pain Pneumonia due to infectious organism, unspecified laterality, unspecified part of lung ED (DISCHARGE) [facility] ED           Was this an external facility discharge? No     Challenges to be reviewed by the provider   Additional needs identified to be addressed with provider: No       Method of communication with provider: none. Patient s/p PPM 23 for CHB. Reports dressing C/D/I. Does reports some surrounding bruising. Denies erythema, drainage, hot to touch. Denies chest pain, sob, palpitations, dizziness, ac distress. Discussed sx which require immediate MD notification. Advised to contact [phone] [facility] should dressing satr to or completely come off. No monitoring device to check BP/HR. No qualifying dx for RPM. Reviewed post-procedure instructions as per AVS pg 10. Patient v/u and adherence. Patient is non-smoker. Patient returned to ED 23 d/t lung pain--dx'd w/ Pna. Reports continued lung discomfort w/ deep breathing. Denies acute sob, fever, chills. Noted to be speaking in complete, unlabored sentences. No home O2, no pulse ox, no IS. Encouraged/reviewed cough and deep breathing exercises. Confirmed taking Zithromax.  Advised to not skip doses and

## 2023-10-02 NOTE — TELEPHONE ENCOUNTER
Care Transitions Initial Follow Up Call    Outreach made within 2 business days of discharge: Yes    Patient: Nico Dixon Patient : 1965   MRN: [id number]  Reason for Admission: There are no discharge diagnoses documented for the most recent discharge.   Discharge Date: 10/1/23       Spoke with: unable to lm

## 2023-10-03 ASSESSMENT — ENCOUNTER SYMPTOMS
SHORTNESS OF BREATH: 0
SORE THROAT: 0
ABDOMINAL PAIN: 0
NAUSEA: 0
WHEEZING: 0

## 2023-10-05 ENCOUNTER — CARE COORDINATION (OUTPATIENT)
Dept: CASE MANAGEMENT | Age: 58
End: 2023-10-05

## 2023-10-05 ENCOUNTER — TELEPHONE (OUTPATIENT)
Dept: CARDIOLOGY CLINIC | Age: 58
End: 2023-10-05

## 2023-10-05 NOTE — TELEPHONE ENCOUNTER
Advised patient that was normal there was nothing wrong with pacemaker. He states he just wanted to make sure everything was ok and asked about lifting left arm. Advised him he can not lift above his head he can lift to shoulder area and reach across his body that is fine. He voiced understanding and thanked us for calling back.

## 2023-10-05 NOTE — CARE COORDINATION
Care Transitions Outreach Attempt      Patient: Arthur Catalan Patient : 1965 MRN: [id number]    Reason for Admission: CHB s/p PPM 23, Pna  Discharge Date: 10/1/23       RARS: Readmission Risk Score: 16.5  Facility: [facility] 23-23; ED 23   Last Discharge Facility       Date Complaint Diagnosis Description Type Department Provider    23 Chest Pain Pneumonia due to infectious organism, unspecified laterality, unspecified part of lung ED (DISCHARGE) [facility] ED         Noted following upcoming appointments from discharge chart review:   [address] [address] follow up appointment(s):   Future Appointments   Date Time Provider [address]   10/10/2023  8:30 AM SCHEDULE, [facility]   10/17/2023  2:00 PM SCHEDULE, [address] MED ONC [place]   10/31/2023  9:30 AM SARAH Ojeda - CNP [facility] Heart [facility]   2024  4:00 PM [place] FPS AWV LPN [place] FPS MMA     2:00 PM SCHEDULE, [address] [facility] ONC [place]   2024 10:45 AM SCHEDULE, [facility] MED ONC LAB [facility] ONC [place]   2024 10:45 AM Vianney Fajardo DO [place] FPS MMA   2024 10:45 AM Nitza Mead MD [place] CC [facility]   2024 11:00 AM WILLIAM, MED ONC NURSE SRM[facility] ONC [place]     Attempt to reach for Care Transition follow up call unsuccessful. HIPAA compliant message left requesting call back.  [address] [facility], [facility] [phone]

## 2023-10-05 NOTE — TELEPHONE ENCOUNTER
Patient called he just had a pacer maker placed   Last night he stated that he felt like his heart was pounding

## 2023-10-06 ENCOUNTER — CARE COORDINATION (OUTPATIENT)
Dept: CASE MANAGEMENT | Age: 58
End: 2023-10-06

## 2023-10-06 NOTE — CARE COORDINATION
Care Transitions Outreach Attempt      Patient: Wing Whitlock Patient : 1965 MRN: [id number]   Reason for Admission: CHB s/p PPM 23, Pna  Discharge Date: 10/1/23       RARS: Readmission Risk Score: 16.5  Facility: [facility] 23-23; ED 23   Last Discharge Facility       Date Complaint Diagnosis Description Type Department Provider    23 Chest Pain Pneumonia due to infectious organism, unspecified laterality, unspecified part of lung ED (DISCHARGE) [address] [facility] ED         Noted following upcoming appointments from discharge chart review:   [facility] follow up appointment(s):   Future Appointments   Date Time Provider [address]   10/10/2023  8:30 AM SCHEDULE, [facility]   10/17/2023  2:00 PM SCHEDULE, [address] MED ONC [place]   10/31/2023  9:30 AM Georgina Waters APRN - CNP [facility] Heart [facility]   2024  4:00 PM [place] FPS AWV LPN [place] FPS [facility]   7467  2:00 PM SCHEDULE, [address] MED ONC TREATMENT [address] [facility] MED ONC [place]   2024 10:45 AM SCHEDULE, [address] MED ONC LAB [address] [facility] MED ONC [place]   2024 10:45 AM Vilma Garcia DO [place] FPS [facility]   2024 10:45 AM Cass Hernandez MD [place] CC [facility]   2024 11:00 AM WILLIAM, MED ONC NURSE [address] [facility]     2nd unsuccessful attempt to reach for Care Transition follow up call;message left requesting call back. No approved EC per HIPAA form on file. UTR letter sent via My Chart. CT program closed per protocol, no further outreach scheduled.

## 2023-10-09 ASSESSMENT — ENCOUNTER SYMPTOMS
BACK PAIN: 0
CHEST TIGHTNESS: 0
ABDOMINAL PAIN: 0
PHOTOPHOBIA: 0
NAUSEA: 0
DIARRHEA: 0
CONSTIPATION: 0
BLOOD IN STOOL: 0
SHORTNESS OF BREATH: 0
WHEEZING: 0
EYE PAIN: 0
COLOR CHANGE: 0
COUGH: 0
VOMITING: 0

## 2023-10-10 ENCOUNTER — NURSE ONLY (OUTPATIENT)
Dept: CARDIOLOGY CLINIC | Age: 58
End: 2023-10-10

## 2023-10-10 VITALS — TEMPERATURE: 97.8 F

## 2023-10-10 DIAGNOSIS — Z95.0 STATUS POST PLACEMENT OF CARDIAC PACEMAKER: Primary | ICD-10-CM

## 2023-10-10 PROCEDURE — 99024 POSTOP FOLLOW-UP VISIT: CPT | Performed by: INTERNAL MEDICINE

## 2023-10-17 ENCOUNTER — HOSPITAL ENCOUNTER (OUTPATIENT)
Dept: INFUSION THERAPY | Age: 58
Discharge: HOME | End: 2023-10-17
Attending: INTERNAL MEDICINE
Payer: MEDICARE

## 2023-10-17 DIAGNOSIS — C61 MALIGNANT NEOPLASM OF PROSTATE (HCC): Primary | ICD-10-CM

## 2023-10-17 PROCEDURE — 6360000002 HC RX W HCPCS: Performed by: INTERNAL MEDICINE

## 2023-10-17 PROCEDURE — 96372 THER/PROPH/DIAG INJ SC/IM: CPT

## 2023-10-17 RX ADMIN — LEUPROLIDE ACETATE 22.5 MG: KIT SUBCUTANEOUS at 15:16

## 2023-10-17 NOTE — PROGRESS NOTES
Assisted to infusion area via wheelchair. No complaints voiced. Eligard injection administered as ordered. Tolerated well. AVS provided.

## 2023-10-24 ENCOUNTER — TELEPHONE (OUTPATIENT)
Dept: CARDIOLOGY CLINIC | Age: 58
End: 2023-10-24

## 2023-10-24 NOTE — TELEPHONE ENCOUNTER
Patient returned call and said that he thought something was wrong with his pacemaker. Patient stated that last night he was laying down and his heart started beating fast. Patient said that this went on for a while and he thought that his wire came loose. I advised patient that his pacemaker seems to be working fine. That we have not gotten any reports in the [employer] network showing that anything was going on with his pacemaker. Told him if something was going on with his wire we would get a report. Patient said that the pacemaker is something new for him and he just wanted to be sure it was working.

## 2023-10-24 NOTE — TELEPHONE ENCOUNTER
Patient called to ask if something is wacky with his pacemaker, if someone will call him? He stated that it was a crazy night, he had a rapid heart rate all night. Please call him back at ph# [phone].

## 2023-10-31 ENCOUNTER — OFFICE VISIT (OUTPATIENT)
Dept: CARDIOLOGY CLINIC | Age: 58
End: 2023-10-31
Payer: MEDICARE

## 2023-10-31 VITALS
HEIGHT: 69 IN | BODY MASS INDEX: 28 KG/M2 | DIASTOLIC BLOOD PRESSURE: 64 MMHG | OXYGEN SATURATION: 99 % | SYSTOLIC BLOOD PRESSURE: 116 MMHG | HEART RATE: 79 BPM

## 2023-10-31 DIAGNOSIS — Z95.0 STATUS POST PLACEMENT OF CARDIAC PACEMAKER: Primary | ICD-10-CM

## 2023-10-31 DIAGNOSIS — I44.2 COMPLETE HEART BLOCK (HCC): ICD-10-CM

## 2023-10-31 PROCEDURE — 99024 POSTOP FOLLOW-UP VISIT: CPT | Performed by: NURSE PRACTITIONER

## 2023-10-31 PROCEDURE — 93000 ELECTROCARDIOGRAM COMPLETE: CPT | Performed by: NURSE PRACTITIONER

## 2023-10-31 NOTE — PATIENT INSTRUCTIONS
Please be informed that if you contact our office outside of normal business hours the physician on call cannot help with any scheduling or rescheduling issues, procedure instruction questions or any type of medication issue. We advise you for any urgent/emergency that you go to the nearest emergency room! PLEASE CALL OUR OFFICE DURING NORMAL BUSINESS HOURS    Monday - Friday   8 am to 5 pm    [place]: [address] [place]: [phone]    [place]:  [phone]    **It is YOUR responsibilty to bring medication bottles and/or updated medication list to [address] [facility]. This will allow us to better serve you and all your healthcare needs**    Thank you for allowing us to care for you today! We want to ensure we can follow your treatment plan and we strive to give you the best outcomes and experience possible. If you ever have a life threatening emergency and call 911 - for an ambulance (EMS)   Our providers can only care for you at:   [facility] or [facility]. Even if you have someone take you or you drive yourself we can only care for you in a [facility] facility. Our providers are not setup at the other healthcare locations! We are committed to providing you the best care possible. If you receive a survey after visiting one of our offices, please take time to share your experience concerning your physician office visit. These surveys are confidential and no health information about you is shared. We are eager to improve for you and we are counting on your feedback to help make that happen.

## 2023-10-31 NOTE — PROGRESS NOTES
Patient is here today following a follow-up status post implantation of BiV pacemaker. Patient reported feeling well. He denies chest pain, palpitation, shortness of breath, lightheadedness, dizziness, edema or syncope  Left upper chest site well approximated. No redness no swelling    Device Assessment:      The device is Medtronic BIV pacemaker -     MRI Compatible : yes    Device interrogation was performed. Mode: DDD     Sensing is normal. Impedence is normal.  Threshold is normal.     There has not been interval changes. Estimated battery life is 9.9 years     The underlying rhythm is AS,.  4.0 % atrial paced; 96.5 % ventricular paced. Atrial Arrhythmia : No    Non sustained VT episodes : 1    Sustained VT episodes : No    Patient activity reported 2.0 hrs/day    The patient is pacemaker dependent. Device is functioning appropriately.   We will continue monitoring patient is scheduled  Patient established with cardiology in 3 months or sooner if needed

## 2023-11-01 ENCOUNTER — TELEPHONE (OUTPATIENT)
Dept: CARDIOLOGY CLINIC | Age: 58
End: 2023-11-01

## 2023-11-03 ENCOUNTER — TELEPHONE (OUTPATIENT)
Dept: CARDIOLOGY CLINIC | Age: 58
End: 2023-11-03

## 2023-11-03 NOTE — TELEPHONE ENCOUNTER
Patient was prescribed Enalapril in the hospital.  Patient was not sure if he was still  to take this medication. Please advise.

## 2023-11-06 ENCOUNTER — TELEPHONE (OUTPATIENT)
Dept: CARDIOLOGY CLINIC | Age: 58
End: 2023-11-06

## 2023-11-06 RX ORDER — ENALAPRIL MALEATE 5 MG/1
5 TABLET ORAL DAILY
Qty: 30 TABLET | Refills: 5 | Status: SHIPPED | OUTPATIENT
Start: 2023-11-06

## 2023-11-06 NOTE — TELEPHONE ENCOUNTER
Patient's refill was sent [facility] pharmacy. Patient wanted it sent to [facility]. [facility] said it was sent and [facility] has said they don't have it. Please advise. Enalapril.

## 2023-11-09 DIAGNOSIS — C61 MALIGNANT NEOPLASM OF PROSTATE (HCC): ICD-10-CM

## 2023-11-09 DIAGNOSIS — C79.51 PROSTATE CANCER METASTATIC TO BONE (HCC): ICD-10-CM

## 2023-11-09 DIAGNOSIS — C61 PROSTATE CANCER METASTATIC TO BONE (HCC): ICD-10-CM

## 2023-11-09 RX ORDER — ABIRATERONE ACETATE 250 MG/1
1000 TABLET ORAL DAILY
Qty: 120 TABLET | Refills: 3 | Status: ACTIVE | OUTPATIENT
Start: 2023-11-09

## 2023-12-21 DIAGNOSIS — I51.9 SYSTOLIC DYSFUNCTION: Primary | ICD-10-CM

## 2023-12-21 RX ORDER — METOPROLOL SUCCINATE 25 MG/1
25 TABLET, EXTENDED RELEASE ORAL DAILY
Qty: 30 TABLET | Refills: 3 | Status: SHIPPED | OUTPATIENT
Start: 2023-12-21 | End: 2024-02-06 | Stop reason: SDUPTHER

## 2023-12-21 RX ORDER — SPIRONOLACTONE 25 MG/1
25 TABLET ORAL DAILY
Qty: 30 TABLET | Refills: 5 | Status: SHIPPED | OUTPATIENT
Start: 2023-12-21

## 2023-12-26 ENCOUNTER — TELEPHONE (OUTPATIENT)
Dept: CARDIOLOGY CLINIC | Age: 58
End: 2023-12-26

## 2023-12-26 NOTE — TELEPHONE ENCOUNTER
Left Ventricle: Severely reduced left ventricular systolic function with a visually estimated EF of 20 - 25%. Left ventricle size is normal. Normal wall thickness. Severe global hypokinesis present. Unable to obtain RVSP. Pericardium: No pericardial effusion. Image quality is technically difficult. Procedure performed with the patient in a sitting position. Spoke with pt regarding echo , pt voiced understanding.

## 2024-01-05 ENCOUNTER — PROCEDURE VISIT (OUTPATIENT)
Dept: CARDIOLOGY CLINIC | Age: 59
End: 2024-01-05

## 2024-01-05 DIAGNOSIS — Z95.0 BIVENTRICULAR CARDIAC PACEMAKER IN SITU: Primary | ICD-10-CM

## 2024-01-08 NOTE — PROGRESS NOTES
Patient Name: Scot Durand  Patient : 1965  Patient MRN: [id number]     Primary Oncologist: Analisa Plummer MD  Referring Provider: Brittanie Escobar DO     Date of Service: 2024      Chief Complaint:   Chief Complaint   Patient presents with    Follow-up    Results     He came in for follow-up visit.     Patient Active Problem List:     Muscular dystrophy      Prostate cancer metastatic to bone      Anemia, unspecified     Malignant neoplasm of prostate     Family history of malignant neoplasm of breast     Encounter for nonprocreative genetic counseling     HPI:   Scot Durand is a pleasant 58-year-old  male patient was referred for this of the diagnosis of prostate cancer involving the bone, bladder and pelvic lymph node.  Initially he presented with gross hematuria in 2019. He went to see family doctor and had an ultrasound of the bladder.  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  Invasive prostatic adenocarcinoma considered less likely..    PSA in 2019 was 29.51.  CT abdomen and pelvis on 2019 showed 3.7 cm intraluminal bladder mass likely representing transitional cell carcinoma rather than extension of the prostate.  Chest x-ray showed no acute infiltrative process.  May 14, 2019 he underwent cystoscopy with transurethral resection of the bladder tumor, 2 cm in size and transrectal ultrasound-guided prostate needle biopsy.  Pathology report of left prostate biopsy showed adenocarcinoma Stacey score 4+4.  Number cores positive out of 12.  Proportion of prostatic tissue involved by tumor was 17%. Perineural invasion was seen. Pathology report of urinary bladder biopsy showed prostatic adenocarcinoma.  2019 MRI of pelvis showed large mass left peripheral zone extraprostatic extension including left seminal vesicle involvement

## 2024-01-15 SDOH — ECONOMIC STABILITY: INCOME INSECURITY: HOW HARD IS IT FOR YOU TO PAY FOR THE VERY BASICS LIKE FOOD, HOUSING, MEDICAL CARE, AND HEATING?: NOT VERY HARD

## 2024-01-15 SDOH — ECONOMIC STABILITY: FOOD INSECURITY: WITHIN THE PAST 12 MONTHS, THE FOOD YOU BOUGHT JUST DIDN'T LAST AND YOU DIDN'T HAVE MONEY TO GET MORE.: NEVER TRUE

## 2024-01-15 SDOH — ECONOMIC STABILITY: HOUSING INSECURITY
IN THE LAST 12 MONTHS, WAS THERE A TIME WHEN YOU DID NOT HAVE A STEADY PLACE TO SLEEP OR SLEPT IN A SHELTER (INCLUDING NOW)?: NO

## 2024-01-15 SDOH — ECONOMIC STABILITY: TRANSPORTATION INSECURITY
IN THE PAST 12 MONTHS, HAS LACK OF TRANSPORTATION KEPT YOU FROM MEETINGS, WORK, OR FROM GETTING THINGS NEEDED FOR DAILY LIVING?: NO

## 2024-01-15 SDOH — HEALTH STABILITY: PHYSICAL HEALTH: ON AVERAGE, HOW MANY DAYS PER WEEK DO YOU ENGAGE IN MODERATE TO STRENUOUS EXERCISE (LIKE A BRISK WALK)?: 0 DAYS

## 2024-01-15 SDOH — ECONOMIC STABILITY: FOOD INSECURITY: WITHIN THE PAST 12 MONTHS, YOU WORRIED THAT YOUR FOOD WOULD RUN OUT BEFORE YOU GOT MONEY TO BUY MORE.: NEVER TRUE

## 2024-01-15 SDOH — HEALTH STABILITY: PHYSICAL HEALTH: ON AVERAGE, HOW MANY MINUTES DO YOU ENGAGE IN EXERCISE AT THIS LEVEL?: 0 MIN

## 2024-01-15 ASSESSMENT — PATIENT HEALTH QUESTIONNAIRE - PHQ9
2. FEELING DOWN, DEPRESSED OR HOPELESS: 0
1. LITTLE INTEREST OR PLEASURE IN DOING THINGS: 0
SUM OF ALL RESPONSES TO PHQ9 QUESTIONS 1 & 2: 0
SUM OF ALL RESPONSES TO PHQ QUESTIONS 1-9: 0

## 2024-01-15 ASSESSMENT — LIFESTYLE VARIABLES
HOW MANY STANDARD DRINKS CONTAINING ALCOHOL DO YOU HAVE ON A TYPICAL DAY: PATIENT DOES NOT DRINK
HOW OFTEN DO YOU HAVE A DRINK CONTAINING ALCOHOL: 1
HOW MANY STANDARD DRINKS CONTAINING ALCOHOL DO YOU HAVE ON A TYPICAL DAY: 0
HOW OFTEN DO YOU HAVE SIX OR MORE DRINKS ON ONE OCCASION: 1
HOW OFTEN DO YOU HAVE A DRINK CONTAINING ALCOHOL: NEVER

## 2024-01-16 ENCOUNTER — INITIAL CONSULT (OUTPATIENT)
Dept: CARDIOLOGY CLINIC | Age: 59
End: 2024-01-16
Payer: MEDICARE

## 2024-01-16 ENCOUNTER — TELEMEDICINE (OUTPATIENT)
Dept: FAMILY MEDICINE CLINIC | Age: 59
End: 2024-01-16
Payer: MEDICARE

## 2024-01-16 VITALS
DIASTOLIC BLOOD PRESSURE: 80 MMHG | HEART RATE: 73 BPM | HEIGHT: 69 IN | RESPIRATION RATE: 18 BRPM | OXYGEN SATURATION: 97 % | SYSTOLIC BLOOD PRESSURE: 122 MMHG | BODY MASS INDEX: 27.9 KG/M2

## 2024-01-16 DIAGNOSIS — Z00.00 MEDICARE ANNUAL WELLNESS VISIT, SUBSEQUENT: Primary | ICD-10-CM

## 2024-01-16 DIAGNOSIS — I51.9 SYSTOLIC DYSFUNCTION: Primary | ICD-10-CM

## 2024-01-16 DIAGNOSIS — I44.2 COMPLETE HEART BLOCK (HCC): ICD-10-CM

## 2024-01-16 DIAGNOSIS — I42.8 OTHER CARDIOMYOPATHIES (HCC): ICD-10-CM

## 2024-01-16 PROCEDURE — 1036F TOBACCO NON-USER: CPT | Performed by: NURSE PRACTITIONER

## 2024-01-16 PROCEDURE — 3017F COLORECTAL CA SCREEN DOC REV: CPT | Performed by: STUDENT IN AN ORGANIZED HEALTH CARE EDUCATION/TRAINING PROGRAM

## 2024-01-16 PROCEDURE — G0439 PPPS, SUBSEQ VISIT: HCPCS | Performed by: STUDENT IN AN ORGANIZED HEALTH CARE EDUCATION/TRAINING PROGRAM

## 2024-01-16 PROCEDURE — 3017F COLORECTAL CA SCREEN DOC REV: CPT | Performed by: NURSE PRACTITIONER

## 2024-01-16 PROCEDURE — G8419 CALC BMI OUT NRM PARAM NOF/U: HCPCS | Performed by: NURSE PRACTITIONER

## 2024-01-16 PROCEDURE — G8482 FLU IMMUNIZE ORDER/ADMIN: HCPCS | Performed by: NURSE PRACTITIONER

## 2024-01-16 PROCEDURE — 99214 OFFICE O/P EST MOD 30 MIN: CPT | Performed by: NURSE PRACTITIONER

## 2024-01-16 PROCEDURE — G8427 DOCREV CUR MEDS BY ELIG CLIN: HCPCS | Performed by: NURSE PRACTITIONER

## 2024-01-16 PROCEDURE — G8482 FLU IMMUNIZE ORDER/ADMIN: HCPCS | Performed by: STUDENT IN AN ORGANIZED HEALTH CARE EDUCATION/TRAINING PROGRAM

## 2024-01-16 RX ORDER — LISINOPRIL 10 MG/1
10 TABLET ORAL DAILY
Qty: 90 TABLET | Refills: 1
Start: 2024-01-16

## 2024-01-16 SDOH — ECONOMIC STABILITY: FOOD INSECURITY: WITHIN THE PAST 12 MONTHS, YOU WORRIED THAT YOUR FOOD WOULD RUN OUT BEFORE YOU GOT MONEY TO BUY MORE.: NEVER TRUE

## 2024-01-16 SDOH — ECONOMIC STABILITY: INCOME INSECURITY: HOW HARD IS IT FOR YOU TO PAY FOR THE VERY BASICS LIKE FOOD, HOUSING, MEDICAL CARE, AND HEATING?: NOT HARD AT ALL

## 2024-01-16 SDOH — ECONOMIC STABILITY: FOOD INSECURITY: WITHIN THE PAST 12 MONTHS, THE FOOD YOU BOUGHT JUST DIDN'T LAST AND YOU DIDN'T HAVE MONEY TO GET MORE.: NEVER TRUE

## 2024-01-16 ASSESSMENT — PATIENT HEALTH QUESTIONNAIRE - PHQ9
2. FEELING DOWN, DEPRESSED OR HOPELESS: 0
SUM OF ALL RESPONSES TO PHQ9 QUESTIONS 1 & 2: 0
SUM OF ALL RESPONSES TO PHQ QUESTIONS 1-9: 0
1. LITTLE INTEREST OR PLEASURE IN DOING THINGS: 0

## 2024-01-16 ASSESSMENT — LIFESTYLE VARIABLES
HOW OFTEN DO YOU HAVE A DRINK CONTAINING ALCOHOL: NEVER
HOW MANY STANDARD DRINKS CONTAINING ALCOHOL DO YOU HAVE ON A TYPICAL DAY: PATIENT DOES NOT DRINK

## 2024-01-16 ASSESSMENT — ENCOUNTER SYMPTOMS
COUGH: 0
SHORTNESS OF BREATH: 0

## 2024-01-16 NOTE — PATIENT INSTRUCTIONS
Personalized Preventive Plan for Scot Durand - 1/16/2024  Medicare offers a range of preventive health benefits. Some of the tests and screenings are paid in full while other may be subject to a deductible, co-insurance, and/or copay.    Some of these benefits include a comprehensive review of your medical history including lifestyle, illnesses that may run in your family, and various assessments and screenings as appropriate.    After reviewing your medical record and screening and assessments performed today your provider may have ordered immunizations, labs, imaging, and/or referrals for you.  A list of these orders (if applicable) as well as your Preventive Care list are included within your After Visit Summary for your review.    Other Preventive Recommendations:    A preventive eye exam performed by an eye specialist is recommended every 1-2 years to screen for glaucoma; cataracts, macular degeneration, and other eye disorders.  A preventive dental visit is recommended every 6 months.  Try to get at least 150 minutes of exercise per week or 10,000 steps per day on a pedometer .  Order or download the FREE \"Exercise & Physical Activity: Your Everyday Guide\" from [employer]. Call 1-148.622.6396 or search [employer] online.  You need 4627-8694 mg of calcium and 5157-1609 IU of vitamin D per day. It is possible to meet your calcium requirement with diet alone, but a vitamin D supplement is usually necessary to meet this goal.  When exposed to the sun, use a sunscreen that protects against both UVA and UVB radiation with an SPF of 30 or greater. Reapply every 2 to 3 hours or after sweating, drying off with a towel, or swimming.  Always wear a seat belt when traveling in a car. Always wear a helmet when riding a bicycle or motorcycle.

## 2024-01-16 NOTE — PROGRESS NOTES
Medicare Annual Wellness Visit    Scot Durand is here for Medicare AWV    Assessment & Plan   Medicare annual wellness visit, subsequent  Recommendations for Preventive Services Due: see orders and patient instructions/AVS.  Recommended screening schedule for the next 5-10 years is provided to the patient in written form: see Patient Instructions/AVS.     No follow-ups on file.     Subjective       Patient's complete Health Risk Assessment and screening values have been reviewed and are found in Flowsheets. The following problems were reviewed today and where indicated follow up appointments were made and/or referrals ordered.    Positive Risk Factor Screenings with Interventions:    Fall Risk:  Do you feel unsteady or are you worried about falling? : no (has special chair to keep him independent)  2 or more falls in past year?: (!) yes  Fall with injury in past year?: (!) yes     Interventions:    Patient comments: patient has MD and now has a specialized chair that he \"scoots around in\", using his legs. The chair elevates for him to be more independent. He always uses his chair now that he has it, which will help limit his falls.  Reviewed medications, home hazards, visual acuity, and co-morbidities that can increase risk for falls  Patient declines any further evaluation or treatment                Vision Screen:  Do you have difficulty driving, watching TV, or doing any of your daily activities because of your eyesight?: No  Have you had an eye exam within the past year?: (!) No (cannot afford right now)  No results found.    Interventions:   Patient comments: states he needs to pay his dentist bill first, then he will make an appointment with his eye doctor.  Patient declines any further evaluation or treatment     ADL's:   Patient reports needing help with:  Select all that apply: (!) Dressing, Toileting (only needs help buckling his pants; sister empties bedside commode)  Select all that apply: (!)

## 2024-01-18 ENCOUNTER — HOSPITAL ENCOUNTER (OUTPATIENT)
Dept: INFUSION THERAPY | Age: 59
Discharge: HOME | End: 2024-01-18
Payer: MEDICARE

## 2024-01-18 DIAGNOSIS — C61 MALIGNANT NEOPLASM OF PROSTATE (HCC): Primary | ICD-10-CM

## 2024-01-18 PROCEDURE — 96402 CHEMO HORMON ANTINEOPL SQ/IM: CPT

## 2024-01-18 PROCEDURE — 6360000002 HC RX W HCPCS: Performed by: INTERNAL MEDICINE

## 2024-01-18 RX ADMIN — LEUPROLIDE ACETATE 22.5 MG: KIT at 10:46

## 2024-01-18 NOTE — PROGRESS NOTES
Patient wheeled to infusion area, here today for an Eligard injection injection. No concerns at this time. mikaela Mckee updated this nurse that Eligard is unavailable at this time (difficulty getting medication in). Will substitute for Lupron today. Discussed with patient and verbalized understanding. Patient in wheelchair, and unable to stand to receive injection in buttocks. Discussed with violeta Mckee to receive Lupron in deltoid. Treatment approved and given in left deltoid IM. Patient tolerated well. Patient declined AVS, uses mychart. RTC 01/26 for labs.

## 2024-01-25 ENCOUNTER — HOSPITAL ENCOUNTER (OUTPATIENT)
Dept: NUCLEAR MEDICINE | Age: 59
Discharge: HOME | End: 2024-01-25
Attending: INTERNAL MEDICINE
Payer: MEDICARE

## 2024-01-25 ENCOUNTER — HOSPITAL ENCOUNTER (OUTPATIENT)
Dept: INFUSION THERAPY | Age: 59
Discharge: HOME | End: 2024-01-25
Payer: MEDICARE

## 2024-01-25 DIAGNOSIS — C61 MALIGNANT NEOPLASM OF PROSTATE (HCC): ICD-10-CM

## 2024-01-25 LAB
ALBUMIN SERPL-MCNC: 4.6 GM/DL (ref 3.4–5)
ALP BLD-CCNC: 54 IU/L (ref 40–129)
ALT SERPL-CCNC: 18 U/L (ref 10–40)
ANION GAP SERPL CALCULATED.3IONS-SCNC: 15 MMOL/L (ref 7–16)
AST SERPL-CCNC: 18 IU/L (ref 15–37)
BASOPHILS ABSOLUTE: 0 K/CU MM
BASOPHILS RELATIVE PERCENT: 0.5 % (ref 0–1)
BILIRUB SERPL-MCNC: 0.5 MG/DL (ref 0–1)
BUN SERPL-MCNC: 12 MG/DL (ref 6–23)
CALCIUM SERPL-MCNC: 9.4 MG/DL (ref 8.3–10.6)
CHLORIDE BLD-SCNC: 103 MMOL/L (ref 99–110)
CO2: 22 MMOL/L (ref 21–32)
CREAT SERPL-MCNC: 0.2 MG/DL (ref 0.9–1.3)
DIFFERENTIAL TYPE: ABNORMAL
EOSINOPHILS ABSOLUTE: 0.4 K/CU MM
EOSINOPHILS RELATIVE PERCENT: 6.1 % (ref 0–3)
GFR SERPL CREATININE-BSD FRML MDRD: >60 ML/MIN/1.73M2
GLUCOSE SERPL-MCNC: 88 MG/DL (ref 70–99)
HCT VFR BLD CALC: 40.1 % (ref 42–52)
HEMOGLOBIN: 12.9 GM/DL (ref 13.5–18)
LYMPHOCYTES ABSOLUTE: 1.8 K/CU MM
LYMPHOCYTES RELATIVE PERCENT: 28.4 % (ref 24–44)
MCH RBC QN AUTO: 29.3 PG (ref 27–31)
MCHC RBC AUTO-ENTMCNC: 32.2 % (ref 32–36)
MCV RBC AUTO: 90.9 FL (ref 78–100)
MONOCYTES ABSOLUTE: 0.4 K/CU MM
MONOCYTES RELATIVE PERCENT: 6.3 % (ref 0–4)
PDW BLD-RTO: 14.5 % (ref 11.7–14.9)
PLATELET # BLD: 150 K/CU MM (ref 140–440)
PMV BLD AUTO: 11.6 FL (ref 7.5–11.1)
POTASSIUM SERPL-SCNC: 3.9 MMOL/L (ref 3.5–5.1)
PSA ULTRASENSITIVE: 0.01 NG/ML (ref 0–4)
RBC # BLD: 4.41 M/CU MM (ref 4.6–6.2)
SEGMENTED NEUTROPHILS ABSOLUTE COUNT: 3.6 K/CU MM
SEGMENTED NEUTROPHILS RELATIVE PERCENT: 58.7 % (ref 36–66)
SODIUM BLD-SCNC: 140 MMOL/L (ref 135–145)
TOTAL PROTEIN: 6.8 GM/DL (ref 6.4–8.2)
WBC # BLD: 6.2 K/CU MM (ref 4–10.5)

## 2024-01-25 PROCEDURE — 36415 COLL VENOUS BLD VENIPUNCTURE: CPT

## 2024-01-25 PROCEDURE — 80053 COMPREHEN METABOLIC PANEL: CPT

## 2024-01-25 PROCEDURE — 85025 COMPLETE CBC W/AUTO DIFF WBC: CPT

## 2024-01-25 PROCEDURE — 78306 BONE IMAGING WHOLE BODY: CPT | Performed by: INTERNAL MEDICINE

## 2024-01-25 PROCEDURE — A9503 TC99M MEDRONATE: HCPCS | Performed by: INTERNAL MEDICINE

## 2024-01-25 PROCEDURE — 84153 ASSAY OF PSA TOTAL: CPT

## 2024-01-25 PROCEDURE — 3430000000 HC RX DIAGNOSTIC RADIOPHARMACEUTICAL: Performed by: INTERNAL MEDICINE

## 2024-01-25 RX ORDER — TC 99M MEDRONATE 20 MG/10ML
27.4 INJECTION, POWDER, LYOPHILIZED, FOR SOLUTION INTRAVENOUS
Status: COMPLETED | OUTPATIENT
Start: 2024-01-25 | End: 2024-01-25

## 2024-01-25 RX ADMIN — TC 99M MEDRONATE 27.4 MILLICURIE: 20 INJECTION, POWDER, LYOPHILIZED, FOR SOLUTION INTRAVENOUS at 09:35

## 2024-02-01 ENCOUNTER — OFFICE VISIT (OUTPATIENT)
Dept: ONCOLOGY | Age: 59
End: 2024-02-01
Payer: MEDICARE

## 2024-02-01 ENCOUNTER — HOSPITAL ENCOUNTER (OUTPATIENT)
Dept: INFUSION THERAPY | Age: 59
Discharge: HOME | End: 2024-02-01
Payer: MEDICARE

## 2024-02-01 VITALS
SYSTOLIC BLOOD PRESSURE: 123 MMHG | DIASTOLIC BLOOD PRESSURE: 67 MMHG | HEART RATE: 66 BPM | BODY MASS INDEX: 27.91 KG/M2 | OXYGEN SATURATION: 99 % | TEMPERATURE: 97.3 F | HEIGHT: 69 IN

## 2024-02-01 DIAGNOSIS — C79.51 PROSTATE CANCER METASTATIC TO BONE (HCC): ICD-10-CM

## 2024-02-01 DIAGNOSIS — C61 PROSTATE CANCER METASTATIC TO BONE (HCC): ICD-10-CM

## 2024-02-01 DIAGNOSIS — C61 MALIGNANT NEOPLASM OF PROSTATE (HCC): Primary | ICD-10-CM

## 2024-02-01 DIAGNOSIS — I50.22 CHRONIC SYSTOLIC (CONGESTIVE) HEART FAILURE (HCC): ICD-10-CM

## 2024-02-01 PROBLEM — E11.9 TYPE 2 DIABETES MELLITUS (HCC): Status: ACTIVE | Noted: 2024-02-01

## 2024-02-01 PROCEDURE — 1036F TOBACCO NON-USER: CPT | Performed by: INTERNAL MEDICINE

## 2024-02-01 PROCEDURE — G8427 DOCREV CUR MEDS BY ELIG CLIN: HCPCS | Performed by: INTERNAL MEDICINE

## 2024-02-01 PROCEDURE — 99211 OFF/OP EST MAY X REQ PHY/QHP: CPT

## 2024-02-01 PROCEDURE — G8419 CALC BMI OUT NRM PARAM NOF/U: HCPCS | Performed by: INTERNAL MEDICINE

## 2024-02-01 PROCEDURE — 3017F COLORECTAL CA SCREEN DOC REV: CPT | Performed by: INTERNAL MEDICINE

## 2024-02-01 PROCEDURE — 99213 OFFICE O/P EST LOW 20 MIN: CPT | Performed by: INTERNAL MEDICINE

## 2024-02-01 PROCEDURE — G8482 FLU IMMUNIZE ORDER/ADMIN: HCPCS | Performed by: INTERNAL MEDICINE

## 2024-02-01 NOTE — PROGRESS NOTES
MA Rooming Questions  Patient: Scot Durand  MRN: [id number]    Date: 2/1/2024        1. Do you have any new issues?   no         2. Do you need any refills on medications?    no    3. Have you had any imaging done since your last visit?   yes - labs and bone scan 1/25    4. Have you been hospitalized or seen in the emergency room since your last visit here?   yes - 9/28    5. Did the patient have a depression screening completed today? No    No data recorded     PHQ-9 Given to (if applicable):               PHQ-9 Score (if applicable):                     [] Positive     []  Negative              Does question #9 need addressed (if applicable)                     [] Yes    []  No               Elaine Liu CMA

## 2024-02-06 ENCOUNTER — OFFICE VISIT (OUTPATIENT)
Dept: CARDIOLOGY CLINIC | Age: 59
End: 2024-02-06

## 2024-02-06 VITALS
HEIGHT: 69 IN | BODY MASS INDEX: 25.18 KG/M2 | OXYGEN SATURATION: 98 % | WEIGHT: 170 LBS | HEART RATE: 67 BPM | DIASTOLIC BLOOD PRESSURE: 62 MMHG | SYSTOLIC BLOOD PRESSURE: 104 MMHG

## 2024-02-06 DIAGNOSIS — I44.2 COMPLETE HEART BLOCK (HCC): ICD-10-CM

## 2024-02-06 DIAGNOSIS — I51.9 SYSTOLIC DYSFUNCTION: ICD-10-CM

## 2024-02-06 DIAGNOSIS — G71.01 BECKER'S MUSCULAR DYSTROPHY (HCC): Primary | ICD-10-CM

## 2024-02-06 DIAGNOSIS — Z95.0 PACEMAKER: ICD-10-CM

## 2024-02-06 DIAGNOSIS — I50.22 CHRONIC SYSTOLIC (CONGESTIVE) HEART FAILURE (HCC): ICD-10-CM

## 2024-02-06 DIAGNOSIS — J84.9 INTERSTITIAL PULMONARY DISEASE, UNSPECIFIED (HCC): ICD-10-CM

## 2024-02-06 DIAGNOSIS — R55 SYNCOPE AND COLLAPSE: ICD-10-CM

## 2024-02-06 DIAGNOSIS — I42.8 NONISCHEMIC CARDIOMYOPATHY (HCC): ICD-10-CM

## 2024-02-06 RX ORDER — METOPROLOL SUCCINATE 50 MG/1
50 TABLET, EXTENDED RELEASE ORAL DAILY
Qty: 90 TABLET | Refills: 4 | Status: SHIPPED | OUTPATIENT
Start: 2024-02-06

## 2024-02-06 NOTE — PROGRESS NOTES
inappropriate. If there are any questions or concerns please feel free to contact the dictating provider for clarification.

## 2024-02-06 NOTE — ASSESSMENT & PLAN NOTE
Continue 3 monthly checkup may need upgrade to ICD?    Patient activity reported by the device to be 2 hr/day 10.9 years of battery life left      The underlying rhythm is AS, BIV PACED.  1.6 % atrial paced; 98 % ventricular paced.       The patient is pacemaker dependent.

## 2024-02-06 NOTE — ASSESSMENT & PLAN NOTE
Nonischemic cardiomyopathy ejection fraction is 2025% based on echo in September 2023 increase Toprol-XL to 50 mg daily continue lisinopril 10 mg daily continue Aldactone 25 mg daily he is on Jardiance 10 mg daily appears euvolemic will repeat echo by March we will try and titrate up medication monthly he was extensively counseled about heart failure symptoms advised to cut down on salt muscular dystrophy may be contributing to symptom?

## 2024-02-13 RX ORDER — LISINOPRIL 10 MG/1
10 TABLET ORAL DAILY
Qty: 90 TABLET | Refills: 3 | Status: SHIPPED | OUTPATIENT
Start: 2024-02-13

## 2024-02-15 ENCOUNTER — OFFICE VISIT (OUTPATIENT)
Dept: FAMILY MEDICINE CLINIC | Age: 59
End: 2024-02-15
Payer: MEDICARE

## 2024-02-15 VITALS — BODY MASS INDEX: 25.09 KG/M2 | DIASTOLIC BLOOD PRESSURE: 66 MMHG | SYSTOLIC BLOOD PRESSURE: 118 MMHG | HEIGHT: 69 IN

## 2024-02-15 DIAGNOSIS — Z23 NEED FOR PROPHYLACTIC VACCINATION AGAINST STREPTOCOCCUS PNEUMONIAE (PNEUMOCOCCUS): ICD-10-CM

## 2024-02-15 DIAGNOSIS — Z79.4 TYPE 2 DIABETES MELLITUS WITH OTHER SPECIFIED COMPLICATION, WITH LONG-TERM CURRENT USE OF INSULIN (HCC): Primary | ICD-10-CM

## 2024-02-15 DIAGNOSIS — I44.2 COMPLETE HEART BLOCK (HCC): ICD-10-CM

## 2024-02-15 DIAGNOSIS — G71.01 BECKER'S MUSCULAR DYSTROPHY (HCC): ICD-10-CM

## 2024-02-15 DIAGNOSIS — E78.2 MIXED HYPERLIPIDEMIA: ICD-10-CM

## 2024-02-15 DIAGNOSIS — E11.69 TYPE 2 DIABETES MELLITUS WITH OTHER SPECIFIED COMPLICATION, WITH LONG-TERM CURRENT USE OF INSULIN (HCC): Primary | ICD-10-CM

## 2024-02-15 LAB
CHOLEST SERPL-MCNC: 197 MG/DL (ref 0–199)
HDLC SERPL-MCNC: 36 MG/DL (ref 40–60)
LDLC SERPL CALC-MCNC: 139 MG/DL
TRIGL SERPL-MCNC: 110 MG/DL (ref 0–150)
VLDLC SERPL CALC-MCNC: 22 MG/DL

## 2024-02-15 PROCEDURE — 1036F TOBACCO NON-USER: CPT | Performed by: STUDENT IN AN ORGANIZED HEALTH CARE EDUCATION/TRAINING PROGRAM

## 2024-02-15 PROCEDURE — 2022F DILAT RTA XM EVC RTNOPTHY: CPT | Performed by: STUDENT IN AN ORGANIZED HEALTH CARE EDUCATION/TRAINING PROGRAM

## 2024-02-15 PROCEDURE — G8419 CALC BMI OUT NRM PARAM NOF/U: HCPCS | Performed by: STUDENT IN AN ORGANIZED HEALTH CARE EDUCATION/TRAINING PROGRAM

## 2024-02-15 PROCEDURE — G0009 ADMIN PNEUMOCOCCAL VACCINE: HCPCS | Performed by: STUDENT IN AN ORGANIZED HEALTH CARE EDUCATION/TRAINING PROGRAM

## 2024-02-15 PROCEDURE — 3046F HEMOGLOBIN A1C LEVEL >9.0%: CPT | Performed by: STUDENT IN AN ORGANIZED HEALTH CARE EDUCATION/TRAINING PROGRAM

## 2024-02-15 PROCEDURE — G8427 DOCREV CUR MEDS BY ELIG CLIN: HCPCS | Performed by: STUDENT IN AN ORGANIZED HEALTH CARE EDUCATION/TRAINING PROGRAM

## 2024-02-15 PROCEDURE — 99214 OFFICE O/P EST MOD 30 MIN: CPT | Performed by: STUDENT IN AN ORGANIZED HEALTH CARE EDUCATION/TRAINING PROGRAM

## 2024-02-15 PROCEDURE — 3017F COLORECTAL CA SCREEN DOC REV: CPT | Performed by: STUDENT IN AN ORGANIZED HEALTH CARE EDUCATION/TRAINING PROGRAM

## 2024-02-15 PROCEDURE — 90677 PCV20 VACCINE IM: CPT | Performed by: STUDENT IN AN ORGANIZED HEALTH CARE EDUCATION/TRAINING PROGRAM

## 2024-02-15 PROCEDURE — G8482 FLU IMMUNIZE ORDER/ADMIN: HCPCS | Performed by: STUDENT IN AN ORGANIZED HEALTH CARE EDUCATION/TRAINING PROGRAM

## 2024-02-15 NOTE — PROGRESS NOTES
2024    Scot Durand    Chief Complaint   Patient presents with    Follow-up     Hyperlipidemia        HPI  History was obtained from patient.  Scot is a 58 y.o. male with a PMHx as listed below who presents today for follow up on chronic conditions      Heart block.   Doing ok post discharge    1. Type 2 diabetes mellitus with other specified complication, with long-term current use of insulin (HCC)    2. Mixed hyperlipidemia    3. Need for prophylactic vaccination against Streptococcus pneumoniae (pneumococcus)    4. Bronson's muscular dystrophy (HCC)    5. Complete heart block (HCC)             REVIEW OF SYMPTOMS    Review of Systems   Constitutional:  Negative for chills and fatigue.   HENT:  Negative for congestion and sore throat.    Respiratory:  Negative for shortness of breath and wheezing.    Cardiovascular:  Negative for chest pain and palpitations.   Gastrointestinal:  Negative for abdominal pain and nausea.   Genitourinary:  Negative for frequency and urgency.   Neurological:  Negative for light-headedness.       PAST MEDICAL HISTORY  Past Medical History:   Diagnosis Date    Cancer (HCC) Prostate to the bone    Mixed hyperlipidemia 10/24/2019       FAMILY HISTORY  Family History   Problem Relation Age of Onset    Arrhythmia Mother     Stroke Mother     Hypertension Mother     Breast Cancer Mother     Rheum Arthritis Mother     Colon Cancer Father     No Known Problems Sister     Hypertension Maternal Grandmother     Cancer Maternal Grandmother        SOCIAL HISTORY  Social History     Socioeconomic History    Marital status: Single   Tobacco Use    Smoking status: Former     Current packs/day: 0.00     Average packs/day: 1.5 packs/day for 28.6 years (43.0 ttl pk-yrs)     Types: Cigarettes     Start date: 1984     Quit date: 2012     Years since quittin.5    Smokeless tobacco: Never    Tobacco comments:     I don't really don't remember when I started. Somewhere after High

## 2024-02-19 ASSESSMENT — ENCOUNTER SYMPTOMS
COUGH: 0
SHORTNESS OF BREATH: 0

## 2024-02-19 NOTE — RESULT ENCOUNTER NOTE
Don's cholesterol is a trending higher. I recommend we start simvastatin 10mg every evening. Cuation to look for m. Aches, ok simvastatin with muscular dystrophy

## 2024-02-20 ENCOUNTER — OFFICE VISIT (OUTPATIENT)
Dept: CARDIOLOGY CLINIC | Age: 59
End: 2024-02-20
Payer: MEDICARE

## 2024-02-20 VITALS
HEART RATE: 63 BPM | RESPIRATION RATE: 16 BRPM | SYSTOLIC BLOOD PRESSURE: 100 MMHG | WEIGHT: 170 LBS | OXYGEN SATURATION: 99 % | HEIGHT: 69 IN | BODY MASS INDEX: 25.18 KG/M2 | DIASTOLIC BLOOD PRESSURE: 60 MMHG

## 2024-02-20 DIAGNOSIS — Z95.0 PACEMAKER: ICD-10-CM

## 2024-02-20 DIAGNOSIS — I44.2 COMPLETE HEART BLOCK (HCC): ICD-10-CM

## 2024-02-20 DIAGNOSIS — I42.8 NONISCHEMIC CARDIOMYOPATHY (HCC): Primary | ICD-10-CM

## 2024-02-20 DIAGNOSIS — I50.22 CHRONIC SYSTOLIC (CONGESTIVE) HEART FAILURE (HCC): ICD-10-CM

## 2024-02-20 PROCEDURE — 1036F TOBACCO NON-USER: CPT | Performed by: NURSE PRACTITIONER

## 2024-02-20 PROCEDURE — G8482 FLU IMMUNIZE ORDER/ADMIN: HCPCS | Performed by: NURSE PRACTITIONER

## 2024-02-20 PROCEDURE — G8427 DOCREV CUR MEDS BY ELIG CLIN: HCPCS | Performed by: NURSE PRACTITIONER

## 2024-02-20 PROCEDURE — 3017F COLORECTAL CA SCREEN DOC REV: CPT | Performed by: NURSE PRACTITIONER

## 2024-02-20 PROCEDURE — G8419 CALC BMI OUT NRM PARAM NOF/U: HCPCS | Performed by: NURSE PRACTITIONER

## 2024-02-20 PROCEDURE — 99214 OFFICE O/P EST MOD 30 MIN: CPT | Performed by: NURSE PRACTITIONER

## 2024-02-20 NOTE — PROGRESS NOTES
CHF CLINICAL STAFF DOCUMENTATION    Current complaints/issues:   Education provided:     Have you had any Chest Pain? - No    Do you had any Shortness of Breath - No  If Yes - When none    Have you had any dizziness - No  When do you feel dizzy none   How long does it last .none  none     Do you have any edema -  No    last week only after high sodium and fluid intake  swelling in none      Are you on fluid restrictions - Yes    Amount - 64 oz     Are you on sodium restrictions -  no fast foods - watching foods and label   Amount - 2 gm    Extra diuretic use -  No     Do you weigh yourself daily? - No  - unable     Do you feel fatigued - No    Do you have a cough - No    Lung sounds -    Normal - Yes   Abnormal -     Do you have abdominal bloating - No    How is your appetite - good    Do you have difficulty sleeping - Yes  Able to lie flat? -  propped up     Do you have a history of sleep apnea - No  CPAP no    6 min. Walk:  unable   Pre heart rate   Time walked  6 minutes   Distance    Post heart rate        Have you had Flu Vaccine - Yes    Have you had Pneumonia Vaccine - Yes

## 2024-02-20 NOTE — PROGRESS NOTES
[facility]      Visit Date: 2/20/2024  Cardiologist:  Dr. Meier  Primary Care Physician: Brittanie Kang DO    Scot Durand is a 58 y.o. male who presents today for:  CC:   1. Nonischemic cardiomyopathy (HCC)    2. Chronic systolic (congestive) heart failure    3. Complete heart block (HCC)    4. Pacemaker          HPI:   Scot Durand is a 58 y.o. male who presents to the office for a new patient visit in the heart failure clinic.    He was seen in Hospital for 3rd degree heart block with systolic dysfunction. Biv PPM placed at that time. He has now been following up in the office for the last few weeks for medication adjustment.       Chief Complaint   Patient presents with    Congestive Heart Failure     1 month f/u HF      Patient has:  Last hospital admission related to Heart Failure:  10/2023   baseline BNP 1071     baseline weight 189 lb     Device: Biv PPM        Activity: poor - wheel chair bound due to muscular dystrophy     NYHA Class: III       Sodium Restrictions: 2g  Fluid Restrictions: 48-64 oz/day  Sodium and fluid restriction compliance: good. He reports sister is managing his sodium intake for him well.    Past Medical History:   Diagnosis Date    Cancer (HCC) Prostate to the bone    Mixed hyperlipidemia 10/24/2019     Past Surgical History:   Procedure Laterality Date    FACIAL RECONSTRUCTION SURGERY N/A 1986    FRACTURE SURGERY  nose    NOSE SURGERY N/A 1986    PACEMAKER PLACEMENT Left 09/28/2023    Medtronic: BiV PPM, Percepta Quad CRT-P-MRI Surescan    PROSTATE SURGERY  biopsy     Family History   Problem Relation Age of Onset    Arrhythmia Mother     Stroke Mother     Hypertension Mother     Breast Cancer Mother     Rheum Arthritis Mother     Colon Cancer Father     No Known Problems Sister     Hypertension Maternal Grandmother     Cancer Maternal Grandmother      Social History     Tobacco Use    Smoking status: Former     Current packs/day: 0.00

## 2024-02-21 ASSESSMENT — ENCOUNTER SYMPTOMS
ABDOMINAL PAIN: 0
SHORTNESS OF BREATH: 0
WHEEZING: 0
SORE THROAT: 0
NAUSEA: 0

## 2024-03-07 ENCOUNTER — HOSPITAL ENCOUNTER (OUTPATIENT)
Age: 59
Discharge: HOME | End: 2024-03-07
Payer: MEDICARE

## 2024-03-07 DIAGNOSIS — C79.51 PROSTATE CANCER METASTATIC TO BONE (HCC): ICD-10-CM

## 2024-03-07 DIAGNOSIS — C61 MALIGNANT NEOPLASM OF PROSTATE (HCC): ICD-10-CM

## 2024-03-07 DIAGNOSIS — C61 PROSTATE CANCER METASTATIC TO BONE (HCC): ICD-10-CM

## 2024-03-07 DIAGNOSIS — I50.22 CHRONIC SYSTOLIC (CONGESTIVE) HEART FAILURE (HCC): ICD-10-CM

## 2024-03-07 LAB
ANION GAP SERPL CALCULATED.3IONS-SCNC: 13 MMOL/L (ref 7–16)
BUN SERPL-MCNC: 19 MG/DL (ref 6–23)
CALCIUM SERPL-MCNC: 8.9 MG/DL (ref 8.3–10.6)
CHLORIDE BLD-SCNC: 102 MMOL/L (ref 99–110)
CO2: 25 MMOL/L (ref 21–32)
CREAT SERPL-MCNC: 0.3 MG/DL (ref 0.9–1.3)
GFR SERPL CREATININE-BSD FRML MDRD: >60 ML/MIN/1.73M2
GLUCOSE SERPL-MCNC: 113 MG/DL (ref 70–99)
POTASSIUM SERPL-SCNC: 4.3 MMOL/L (ref 3.5–5.1)
SODIUM BLD-SCNC: 140 MMOL/L (ref 135–145)

## 2024-03-07 PROCEDURE — 36415 COLL VENOUS BLD VENIPUNCTURE: CPT

## 2024-03-07 PROCEDURE — 80048 BASIC METABOLIC PNL TOTAL CA: CPT

## 2024-03-07 RX ORDER — ABIRATERONE ACETATE 250 MG/1
TABLET ORAL
Qty: 120 TABLET | Refills: 3 | Status: ACTIVE | OUTPATIENT
Start: 2024-03-07

## 2024-03-12 ENCOUNTER — TELEPHONE (OUTPATIENT)
Dept: CARDIOLOGY CLINIC | Age: 59
End: 2024-03-12

## 2024-03-12 NOTE — TELEPHONE ENCOUNTER
----- Message from SARAH Christianson CNP sent at 3/7/2024  2:41 PM EST -----  Lytes and renal function are good    ----- Message -----  From: [facility] Incoming Lab Results From [facility] (Epic Adt)  Sent: 3/7/2024   2:24 PM EST  To: SARAH Christianson CNP

## 2024-03-25 ENCOUNTER — OFFICE VISIT (OUTPATIENT)
Dept: CARDIOLOGY CLINIC | Age: 59
End: 2024-03-25
Payer: MEDICARE

## 2024-03-25 ENCOUNTER — TELEPHONE (OUTPATIENT)
Dept: CARDIOLOGY CLINIC | Age: 59
End: 2024-03-25

## 2024-03-25 VITALS
HEIGHT: 69 IN | SYSTOLIC BLOOD PRESSURE: 122 MMHG | HEART RATE: 62 BPM | OXYGEN SATURATION: 97 % | DIASTOLIC BLOOD PRESSURE: 66 MMHG | BODY MASS INDEX: 25.1 KG/M2

## 2024-03-25 DIAGNOSIS — I50.22 CHRONIC SYSTOLIC (CONGESTIVE) HEART FAILURE (HCC): ICD-10-CM

## 2024-03-25 DIAGNOSIS — E11.9 TYPE 2 DIABETES MELLITUS WITHOUT COMPLICATION, WITHOUT LONG-TERM CURRENT USE OF INSULIN (HCC): ICD-10-CM

## 2024-03-25 DIAGNOSIS — I44.2 COMPLETE HEART BLOCK (HCC): ICD-10-CM

## 2024-03-25 DIAGNOSIS — Z95.0 PACEMAKER: ICD-10-CM

## 2024-03-25 DIAGNOSIS — I42.8 NONISCHEMIC CARDIOMYOPATHY (HCC): ICD-10-CM

## 2024-03-25 DIAGNOSIS — R55 SYNCOPE AND COLLAPSE: Primary | ICD-10-CM

## 2024-03-25 DIAGNOSIS — I45.9 HEART BLOCK: ICD-10-CM

## 2024-03-25 DIAGNOSIS — G71.01 BECKER'S MUSCULAR DYSTROPHY (HCC): ICD-10-CM

## 2024-03-25 PROCEDURE — 1036F TOBACCO NON-USER: CPT | Performed by: INTERNAL MEDICINE

## 2024-03-25 PROCEDURE — 3017F COLORECTAL CA SCREEN DOC REV: CPT | Performed by: INTERNAL MEDICINE

## 2024-03-25 PROCEDURE — G8427 DOCREV CUR MEDS BY ELIG CLIN: HCPCS | Performed by: INTERNAL MEDICINE

## 2024-03-25 PROCEDURE — 99214 OFFICE O/P EST MOD 30 MIN: CPT | Performed by: INTERNAL MEDICINE

## 2024-03-25 PROCEDURE — 2022F DILAT RTA XM EVC RTNOPTHY: CPT | Performed by: INTERNAL MEDICINE

## 2024-03-25 PROCEDURE — G8482 FLU IMMUNIZE ORDER/ADMIN: HCPCS | Performed by: INTERNAL MEDICINE

## 2024-03-25 PROCEDURE — G8419 CALC BMI OUT NRM PARAM NOF/U: HCPCS | Performed by: INTERNAL MEDICINE

## 2024-03-25 PROCEDURE — 3046F HEMOGLOBIN A1C LEVEL >9.0%: CPT | Performed by: INTERNAL MEDICINE

## 2024-03-25 RX ORDER — LISINOPRIL 20 MG/1
20 TABLET ORAL DAILY
Qty: 90 TABLET | Refills: 4 | Status: SHIPPED | OUTPATIENT
Start: 2024-03-25

## 2024-03-25 NOTE — PATIENT INSTRUCTIONS
**It is YOUR responsibilty to bring medication bottles and/or updated medication list to EACH APPOINTMENT. This will allow us to better serve you and all your healthcare needs**  Thank you for allowing us to care for you today!   We want to ensure we can follow your treatment plan and we strive to give you the best outcomes and experience possible.   If you ever have a life threatening emergency and call 911 - for an ambulance (EMS)   Our providers can only care for you at:   [facility] or [facility].   Even if you have someone take you or you drive yourself we can only care for you in a [facility]. Our providers are not setup at the other healthcare locations!   Please be informed that if you contact our office outside of normal business hours the physician on call cannot help with any scheduling or rescheduling issues, procedure instruction questions or any type of medication issue.    We advise you for any urgent/emergency that you go to the nearest emergency room!    PLEASE CALL OUR OFFICE DURING NORMAL BUSINESS HOURS    Monday - Friday   8 am to 5 pm    [place]: [phone]    [place]: [phone]    [place]:  [phone]  We are committed to providing you the best care possible.    If you receive a survey after visiting one of our offices, please take time to share your experience concerning your physician office visit.  These surveys are confidential and no health information about you is shared.    We are eager to improve for you and we are counting on your feedback to help make that happen.

## 2024-03-25 NOTE — PROGRESS NOTES
daily continue Aldactone 25 mg daily he is on Jardiance 10 mg daily appears euvolemic will repeat echo by March we will try and titrate up medication monthly he was extensively counseled about heart failure symptoms advised to cut down on salt muscular dystrophy may be contributing to symptom?    Complete heart block (HCC)   S/p BiV pacer may need ICD?    Bronson's muscular dystrophy (HCC)  He is more or less wheelchair-bound    Pacemaker  Continue 3 monthly checkup may need upgrade to ICD?    Patient activity reported by the device to be 2 hr/day 10.9 years of battery life left      The underlying rhythm is AS, BIV PACED.  1.6 % atrial paced; 98 % ventricular paced.       The patient is pacemaker dependent.        Dyslipidemia :  All available lab work was reviewed.  Patient was advised to repeat lab work before next visit. Necessary orders were placed , instructions given by myself       Counseled extensively and medication compliance urged.  We discussed that for the  prevention of ASCVD our  goal is aggressive risk modification.Patient is encouraged to exercise if they can , educated about  brisk walk for 30 minutes  at least 3 to 4 times a week if there are no physical limitations  Various goals were discussed and questions answered. Continue current medications. Appropriate prescriptions are addressed and refills ordered.  Questions answered and patient verbalizes understanding.  Call for any problems, questions, or concerns.Greater than 60 % of time spent counseling besides reviewing data and images     Continue all other medications of all above medical condition listed as is.    Return in about 3 months (around 6/25/2024).    Please note this report has been partially produced using speech recognition software and may contain errors related to that system including errors in grammar, punctuation, and spelling, as well as words and phrases that may be inappropriate. If there are any questions or concerns

## 2024-03-28 ENCOUNTER — OFFICE VISIT (OUTPATIENT)
Dept: FAMILY MEDICINE CLINIC | Age: 59
End: 2024-03-28
Payer: MEDICARE

## 2024-03-28 VITALS
SYSTOLIC BLOOD PRESSURE: 122 MMHG | RESPIRATION RATE: 19 BRPM | OXYGEN SATURATION: 96 % | WEIGHT: 170 LBS | HEART RATE: 77 BPM | DIASTOLIC BLOOD PRESSURE: 64 MMHG | HEIGHT: 69 IN | BODY MASS INDEX: 25.18 KG/M2

## 2024-03-28 DIAGNOSIS — E78.2 MIXED HYPERLIPIDEMIA: ICD-10-CM

## 2024-03-28 DIAGNOSIS — G71.01 BECKER'S MUSCULAR DYSTROPHY (HCC): ICD-10-CM

## 2024-03-28 DIAGNOSIS — I44.2 COMPLETE HEART BLOCK (HCC): Primary | ICD-10-CM

## 2024-03-28 DIAGNOSIS — Z95.0 PACEMAKER: ICD-10-CM

## 2024-03-28 DIAGNOSIS — C61 MALIGNANT NEOPLASM OF PROSTATE (HCC): ICD-10-CM

## 2024-03-28 PROCEDURE — G8482 FLU IMMUNIZE ORDER/ADMIN: HCPCS | Performed by: STUDENT IN AN ORGANIZED HEALTH CARE EDUCATION/TRAINING PROGRAM

## 2024-03-28 PROCEDURE — 1036F TOBACCO NON-USER: CPT | Performed by: STUDENT IN AN ORGANIZED HEALTH CARE EDUCATION/TRAINING PROGRAM

## 2024-03-28 PROCEDURE — 3017F COLORECTAL CA SCREEN DOC REV: CPT | Performed by: STUDENT IN AN ORGANIZED HEALTH CARE EDUCATION/TRAINING PROGRAM

## 2024-03-28 PROCEDURE — 99214 OFFICE O/P EST MOD 30 MIN: CPT | Performed by: STUDENT IN AN ORGANIZED HEALTH CARE EDUCATION/TRAINING PROGRAM

## 2024-03-28 PROCEDURE — G8419 CALC BMI OUT NRM PARAM NOF/U: HCPCS | Performed by: STUDENT IN AN ORGANIZED HEALTH CARE EDUCATION/TRAINING PROGRAM

## 2024-03-28 PROCEDURE — G8427 DOCREV CUR MEDS BY ELIG CLIN: HCPCS | Performed by: STUDENT IN AN ORGANIZED HEALTH CARE EDUCATION/TRAINING PROGRAM

## 2024-03-28 NOTE — PROGRESS NOTES
4/15/2024    Scot Durand    Chief Complaint   Patient presents with    Cholesterol Problem       HPI  History was obtained from tasha.  Scot is a 58 y.o. male with a PMHx as listed below who presents today for follow up on chronic conditions. No acute complaints    Cholesterol increasing    Following regularly with cardiology   1. Complete heart block (HCC)    2. Pacemaker    3. Mixed hyperlipidemia    4. Bronson's muscular dystrophy (HCC)    5. Malignant neoplasm of prostate (HCC)             REVIEW OF SYMPTOMS    Review of Systems   Constitutional:  Negative for chills and fatigue.   HENT:  Negative for congestion and sore throat.    Respiratory:  Negative for shortness of breath and wheezing.    Cardiovascular:  Negative for chest pain and palpitations.   Gastrointestinal:  Negative for abdominal pain and nausea.   Genitourinary:  Negative for frequency and urgency.   Neurological:  Negative for light-headedness.       PAST MEDICAL HISTORY  Past Medical History:   Diagnosis Date    Cancer (HCC) Prostate to the bone    Mixed hyperlipidemia 10/24/2019       FAMILY HISTORY  Family History   Problem Relation Age of Onset    Arrhythmia Mother     Stroke Mother     Hypertension Mother     Breast Cancer Mother     Rheum Arthritis Mother     Colon Cancer Father     No Known Problems Sister     Hypertension Maternal Grandmother     Cancer Maternal Grandmother        SOCIAL HISTORY  Social History     Socioeconomic History    Marital status: Single     Spouse name: None    Number of children: None    Years of education: None    Highest education level: None   Tobacco Use    Smoking status: Former     Current packs/day: 0.00     Average packs/day: 1.5 packs/day for 28.6 years (43.0 ttl pk-yrs)     Types: Cigarettes     Start date: 1984     Quit date: 2012     Years since quittin.6    Smokeless tobacco: Never    Tobacco comments:     I don't really don't remember when I started. Somewhere after High

## 2024-04-10 ENCOUNTER — CLINICAL DOCUMENTATION (OUTPATIENT)
Facility: HOSPITAL | Age: 59
End: 2024-04-10

## 2024-04-10 NOTE — PROGRESS NOTES
Patient Assistance  Spoke with Scot on the phone today to let him know his [employer] has  and it is not currently open.  We do however have assistance available through the [facility].  cSot gave me approval to enroll him. He was accepted and approved.      Rocio Borrego  Financial Navigator

## 2024-04-11 ENCOUNTER — PROCEDURE VISIT (OUTPATIENT)
Dept: CARDIOLOGY CLINIC | Age: 59
End: 2024-04-11

## 2024-04-11 ENCOUNTER — HOSPITAL ENCOUNTER (OUTPATIENT)
Dept: INFUSION THERAPY | Age: 59
Discharge: HOME | End: 2024-04-11
Payer: MEDICARE

## 2024-04-11 DIAGNOSIS — Z95.0 BIVENTRICULAR CARDIAC PACEMAKER IN SITU: Primary | ICD-10-CM

## 2024-04-11 DIAGNOSIS — C61 MALIGNANT NEOPLASM OF PROSTATE (HCC): Primary | ICD-10-CM

## 2024-04-11 PROCEDURE — 96402 CHEMO HORMON ANTINEOPL SQ/IM: CPT

## 2024-04-11 PROCEDURE — 6360000002 HC RX W HCPCS: Performed by: INTERNAL MEDICINE

## 2024-04-11 RX ADMIN — LEUPROLIDE ACETATE 22.5 MG: 22.5 INJECTION, SUSPENSION, EXTENDED RELEASE SUBCUTANEOUS at 10:10

## 2024-04-11 NOTE — PROGRESS NOTES
Patient wheeled to infusion area, here today for a Eligard injection. No concerns at this time. Treatment approved and given in [facility]. Patient tolerated well. Patient provided discharge instructions.'

## 2024-04-15 ASSESSMENT — ENCOUNTER SYMPTOMS
SHORTNESS OF BREATH: 0
NAUSEA: 0
ABDOMINAL PAIN: 0
WHEEZING: 0
SORE THROAT: 0

## 2024-04-19 ENCOUNTER — TELEPHONE (OUTPATIENT)
Dept: CARDIOLOGY CLINIC | Age: 59
End: 2024-04-19

## 2024-04-19 NOTE — TELEPHONE ENCOUNTER
Echo       Left Ventricle: Moderately reduced left ventricular systolic function with a visually estimated EF of 30 - 35%. Left ventricle is mildly dilated. Mildly increased wall thickness. Global hypokinesis present. Akinesis of the following segments: apical cap. Grade II diastolic dysfunction with increased LAP.    Right Ventricle: PPM wire noted in the right side of the heart.    Aortic Valve: Thickened cusp. Moderate regurgitation.    Mitral Valve: Mild regurgitation.    Pericardium: No pericardial effusion.    Image quality is good.    Compared to previous echo on  9/2023, EF has improves from 20% to 35%.     Next appt 04/30/24 1130a    Spoke to pt regarding results and follow up appt.  Patient advised and voices understanding.

## 2024-04-30 ENCOUNTER — OFFICE VISIT (OUTPATIENT)
Dept: CARDIOLOGY CLINIC | Age: 59
End: 2024-04-30
Payer: MEDICARE

## 2024-04-30 ENCOUNTER — TELEPHONE (OUTPATIENT)
Dept: ONCOLOGY | Age: 59
End: 2024-04-30

## 2024-04-30 VITALS
RESPIRATION RATE: 16 BRPM | HEIGHT: 69 IN | BODY MASS INDEX: 25.09 KG/M2 | SYSTOLIC BLOOD PRESSURE: 100 MMHG | HEART RATE: 61 BPM | OXYGEN SATURATION: 99 % | DIASTOLIC BLOOD PRESSURE: 60 MMHG

## 2024-04-30 DIAGNOSIS — Z95.0 BIVENTRICULAR CARDIAC PACEMAKER IN SITU: ICD-10-CM

## 2024-04-30 DIAGNOSIS — I42.8 NONISCHEMIC CARDIOMYOPATHY (HCC): Primary | ICD-10-CM

## 2024-04-30 DIAGNOSIS — R55 SYNCOPE AND COLLAPSE: ICD-10-CM

## 2024-04-30 DIAGNOSIS — I50.22 CHRONIC SYSTOLIC (CONGESTIVE) HEART FAILURE (HCC): ICD-10-CM

## 2024-04-30 PROCEDURE — G8419 CALC BMI OUT NRM PARAM NOF/U: HCPCS | Performed by: NURSE PRACTITIONER

## 2024-04-30 PROCEDURE — 99213 OFFICE O/P EST LOW 20 MIN: CPT | Performed by: NURSE PRACTITIONER

## 2024-04-30 PROCEDURE — 1036F TOBACCO NON-USER: CPT | Performed by: NURSE PRACTITIONER

## 2024-04-30 PROCEDURE — G8427 DOCREV CUR MEDS BY ELIG CLIN: HCPCS | Performed by: NURSE PRACTITIONER

## 2024-04-30 PROCEDURE — 3017F COLORECTAL CA SCREEN DOC REV: CPT | Performed by: NURSE PRACTITIONER

## 2024-04-30 RX ORDER — LISINOPRIL 30 MG/1
30 TABLET ORAL DAILY
Qty: 90 TABLET | Refills: 0 | Status: SHIPPED | OUTPATIENT
Start: 2024-04-30

## 2024-04-30 ASSESSMENT — ENCOUNTER SYMPTOMS
COUGH: 0
SHORTNESS OF BREATH: 0

## 2024-04-30 NOTE — PROGRESS NOTES
CHF CLINICAL STAFF DOCUMENTATION    Current complaints/issues:  will be tested soon -believes prostate cancer is in remission.  Education provided:     Have you had any Chest Pain? - No    Do you had any Shortness of Breath - No  If Yes - When none    Have you had any dizziness - No  When do you feel dizzy none   How long does it last .none  none     Do you have any edema -  No  swelling in none      Are you on fluid restrictions - Yes    Amount - 64 oz     Are you on sodium restrictions - Yes   Amount - 2 gm    Extra diuretic use -  No     Do you weigh yourself daily? -  unable     Do you feel fatigued - No    Do you have a cough - No    Lung sounds -    Normal - Yes   Abnormal -     Do you have abdominal bloating - No    How is your appetite - good    Do you have difficulty sleeping - No  Able to lie flat? -  propped up    Do you have a history of sleep apnea - No  CPAP no    6 min. Walk:  unable  Beckers Muscular dystrophy  Pre heart rate   Time walked  6 minutes   Distance    Post heart rate        Have you had Flu Vaccine - Yes    Have you had Pneumonia Vaccine - Yes   
teach back method, and is agreeable to the treatment plan.       Electronicallysigned by SARAH Christianson CNP on 4/30/2024 at 10:48 PM

## 2024-04-30 NOTE — TELEPHONE ENCOUNTER
4/30/24 - left pt vm for the 5/8/24 Pet scan at [facility] arrival time of 12:30 pm for a 1:00 appt - NPO 4 hours prior plain water only - Diabetic prep. I also r/s Filix appt to allow enough time for scan results The r/s appt is 5/15/24 at 11:00 am. I also  mailed appt information

## 2024-05-02 ENCOUNTER — TELEPHONE (OUTPATIENT)
Dept: CARDIOLOGY CLINIC | Age: 59
End: 2024-05-02

## 2024-05-02 ENCOUNTER — HOSPITAL ENCOUNTER (OUTPATIENT)
Dept: INFUSION THERAPY | Age: 59
Discharge: HOME | End: 2024-05-02
Payer: MEDICARE

## 2024-05-02 DIAGNOSIS — C79.51 PROSTATE CANCER METASTATIC TO BONE (HCC): ICD-10-CM

## 2024-05-02 DIAGNOSIS — C61 MALIGNANT NEOPLASM OF PROSTATE (HCC): ICD-10-CM

## 2024-05-02 DIAGNOSIS — C61 PROSTATE CANCER METASTATIC TO BONE (HCC): ICD-10-CM

## 2024-05-02 LAB
ALBUMIN SERPL-MCNC: 4.6 GM/DL (ref 3.4–5)
ALP BLD-CCNC: 60 IU/L (ref 40–128)
ALT SERPL-CCNC: 15 U/L (ref 10–40)
ANION GAP SERPL CALCULATED.3IONS-SCNC: 12 MMOL/L (ref 7–16)
AST SERPL-CCNC: 18 IU/L (ref 15–37)
BASOPHILS ABSOLUTE: 0 K/CU MM
BASOPHILS RELATIVE PERCENT: 0.5 % (ref 0–1)
BILIRUB SERPL-MCNC: 0.6 MG/DL (ref 0–1)
BUN SERPL-MCNC: 11 MG/DL (ref 6–23)
CALCIUM SERPL-MCNC: 9 MG/DL (ref 8.3–10.6)
CHLORIDE BLD-SCNC: 105 MMOL/L (ref 99–110)
CO2: 24 MMOL/L (ref 21–32)
CREAT SERPL-MCNC: <0.5 MG/DL (ref 0.9–1.3)
DIFFERENTIAL TYPE: ABNORMAL
EOSINOPHILS ABSOLUTE: 0.1 K/CU MM
EOSINOPHILS RELATIVE PERCENT: 2.2 % (ref 0–3)
GFR, ESTIMATED: ABNORMAL ML/MIN/1.73M2
GLUCOSE SERPL-MCNC: 92 MG/DL (ref 70–99)
HCT VFR BLD CALC: 41.1 % (ref 42–52)
HEMOGLOBIN: 13.3 GM/DL (ref 13.5–18)
LYMPHOCYTES ABSOLUTE: 1.4 K/CU MM
LYMPHOCYTES RELATIVE PERCENT: 22.5 % (ref 24–44)
MCH RBC QN AUTO: 29.6 PG (ref 27–31)
MCHC RBC AUTO-ENTMCNC: 32.4 % (ref 32–36)
MCV RBC AUTO: 91.3 FL (ref 78–100)
MONOCYTES ABSOLUTE: 0.4 K/CU MM
MONOCYTES RELATIVE PERCENT: 5.8 % (ref 0–4)
NEUTROPHILS ABSOLUTE: 4.4 K/CU MM
NEUTROPHILS RELATIVE PERCENT: 69 % (ref 36–66)
PDW BLD-RTO: 14.5 % (ref 11.7–14.9)
PLATELET # BLD: 148 K/CU MM (ref 140–440)
PMV BLD AUTO: 11.7 FL (ref 7.5–11.1)
POTASSIUM SERPL-SCNC: 4.4 MMOL/L (ref 3.5–5.1)
PSA ULTRASENSITIVE: 0.01 NG/ML (ref 0–4)
RBC # BLD: 4.5 M/CU MM (ref 4.6–6.2)
SODIUM BLD-SCNC: 141 MMOL/L (ref 135–145)
TOTAL PROTEIN: 7 GM/DL (ref 6.4–8.2)
WBC # BLD: 6.4 K/CU MM (ref 4–10.5)

## 2024-05-02 PROCEDURE — 36415 COLL VENOUS BLD VENIPUNCTURE: CPT

## 2024-05-02 PROCEDURE — 80053 COMPREHEN METABOLIC PANEL: CPT

## 2024-05-02 PROCEDURE — 85025 COMPLETE CBC W/AUTO DIFF WBC: CPT

## 2024-05-02 PROCEDURE — 84153 ASSAY OF PSA TOTAL: CPT

## 2024-05-06 NOTE — PROGRESS NOTES
Patient Name: Scot Durand  Patient : 1965  Patient MRN: [id number]     Primary Oncologist: Analisa Plummer MD  Referring Provider: Brittanie Escobar DO     Date of Service: 2024      Chief Complaint:   No chief complaint on file.    He came in for follow-up visit.     Patient Active Problem List:     Muscular dystrophy      Prostate cancer metastatic to bone      Anemia, unspecified     Malignant neoplasm of prostate     Family history of malignant neoplasm of breast     Encounter for nonprocreative genetic counseling     HPI:   Scot Durand is a pleasant 58-year-old  male patient was referred for this of the diagnosis of prostate cancer involving the bone, bladder and pelvic lymph node.  Initially he presented with gross hematuria in 2019. He went to see family doctor and had an ultrasound of the bladder.  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  Invasive prostatic adenocarcinoma considered less likely..    PSA in 2019 was 29.51.  CT abdomen and pelvis on 2019 showed 3.7 cm intraluminal bladder mass likely representing transitional cell carcinoma rather than extension of the prostate.  Chest x-ray showed no acute infiltrative process.  May 14, 2019 he underwent cystoscopy with transurethral resection of the bladder tumor, 2 cm in size and transrectal ultrasound-guided prostate needle biopsy.  Pathology report of left prostate biopsy showed adenocarcinoma Stacey score 4+4.  Number cores positive out of 12.  Proportion of prostatic tissue involved by tumor was 17%. Perineural invasion was seen. Pathology report of urinary bladder biopsy showed prostatic adenocarcinoma.  2019 MRI of pelvis showed large mass left peripheral zone extraprostatic extension including left seminal vesicle involvement and left neurovascular bundle

## 2024-05-09 ENCOUNTER — HOSPITAL ENCOUNTER (OUTPATIENT)
Dept: PET IMAGING | Age: 59
Discharge: HOME | End: 2024-05-09
Attending: INTERNAL MEDICINE
Payer: MEDICARE

## 2024-05-09 ENCOUNTER — HOSPITAL ENCOUNTER (OUTPATIENT)
Age: 59
Discharge: HOME | End: 2024-05-09
Attending: INTERNAL MEDICINE
Payer: MEDICARE

## 2024-05-09 DIAGNOSIS — C79.51 PROSTATE CANCER METASTATIC TO BONE (HCC): ICD-10-CM

## 2024-05-09 DIAGNOSIS — C61 PROSTATE CANCER METASTATIC TO BONE (HCC): ICD-10-CM

## 2024-05-09 DIAGNOSIS — I42.8 NONISCHEMIC CARDIOMYOPATHY (HCC): ICD-10-CM

## 2024-05-09 DIAGNOSIS — I50.22 CHRONIC SYSTOLIC (CONGESTIVE) HEART FAILURE (HCC): ICD-10-CM

## 2024-05-09 DIAGNOSIS — C61 MALIGNANT NEOPLASM OF PROSTATE (HCC): ICD-10-CM

## 2024-05-09 LAB
ANION GAP SERPL CALCULATED.3IONS-SCNC: 12 MMOL/L (ref 7–16)
BUN SERPL-MCNC: 19 MG/DL (ref 6–23)
CALCIUM SERPL-MCNC: 9 MG/DL (ref 8.3–10.6)
CHLORIDE BLD-SCNC: 103 MMOL/L (ref 99–110)
CO2: 25 MMOL/L (ref 21–32)
CREAT SERPL-MCNC: 0.2 MG/DL (ref 0.9–1.3)
GFR, ESTIMATED: >90 ML/MIN/1.73M2
GLUCOSE SERPL-MCNC: 85 MG/DL (ref 70–99)
POTASSIUM SERPL-SCNC: 4.5 MMOL/L (ref 3.5–5.1)
SODIUM BLD-SCNC: 140 MMOL/L (ref 135–145)

## 2024-05-09 PROCEDURE — 36415 COLL VENOUS BLD VENIPUNCTURE: CPT

## 2024-05-09 PROCEDURE — A9595 HC RX DIAGNOSTIC RADIOPHARMACEUTICAL: HCPCS | Performed by: INTERNAL MEDICINE

## 2024-05-09 PROCEDURE — 2580000003 HC RX 258: Performed by: INTERNAL MEDICINE

## 2024-05-09 PROCEDURE — 80048 BASIC METABOLIC PNL TOTAL CA: CPT

## 2024-05-09 PROCEDURE — 78815 PET IMAGE W/CT SKULL-THIGH: CPT

## 2024-05-09 PROCEDURE — 3430000000 HC RX DIAGNOSTIC RADIOPHARMACEUTICAL: Performed by: INTERNAL MEDICINE

## 2024-05-09 RX ORDER — SODIUM CHLORIDE 0.9 % (FLUSH) 0.9 %
10 SYRINGE (ML) INJECTION PRN
Status: COMPLETED | OUTPATIENT
Start: 2024-05-09 | End: 2024-05-09

## 2024-05-09 RX ADMIN — SODIUM CHLORIDE, PRESERVATIVE FREE 10 ML: 5 INJECTION INTRAVENOUS at 11:14

## 2024-05-09 RX ADMIN — PIFLUFOLASTAT F-18 8.2 MILLICURIE: 80 INJECTION INTRAVENOUS at 11:14

## 2024-05-13 ENCOUNTER — TELEPHONE (OUTPATIENT)
Dept: CARDIOLOGY CLINIC | Age: 59
End: 2024-05-13

## 2024-05-13 NOTE — TELEPHONE ENCOUNTER
----- Message from SARAH Christianson CNP sent at 5/11/2024  5:43 AM EDT -----  Labs are acceptable. Continue current plan  ----- Message -----  From: [facility] Incoming Lab Results From [facility] (Epic Adt)  Sent: 5/9/2024   2:34 PM EDT  To: SARAH Christianson CNP

## 2024-05-16 ENCOUNTER — HOSPITAL ENCOUNTER (OUTPATIENT)
Dept: INFUSION THERAPY | Age: 59
Discharge: HOME | End: 2024-05-16
Payer: MEDICARE

## 2024-05-16 ENCOUNTER — OFFICE VISIT (OUTPATIENT)
Dept: ONCOLOGY | Age: 59
End: 2024-05-16
Payer: MEDICARE

## 2024-05-16 VITALS
SYSTOLIC BLOOD PRESSURE: 134 MMHG | HEIGHT: 69 IN | OXYGEN SATURATION: 99 % | DIASTOLIC BLOOD PRESSURE: 71 MMHG | BODY MASS INDEX: 25.09 KG/M2 | TEMPERATURE: 97.6 F | RESPIRATION RATE: 16 BRPM | HEART RATE: 66 BPM

## 2024-05-16 DIAGNOSIS — C61 MALIGNANT NEOPLASM OF PROSTATE (HCC): Primary | ICD-10-CM

## 2024-05-16 PROCEDURE — 3017F COLORECTAL CA SCREEN DOC REV: CPT | Performed by: INTERNAL MEDICINE

## 2024-05-16 PROCEDURE — 99214 OFFICE O/P EST MOD 30 MIN: CPT | Performed by: INTERNAL MEDICINE

## 2024-05-16 PROCEDURE — G8427 DOCREV CUR MEDS BY ELIG CLIN: HCPCS | Performed by: INTERNAL MEDICINE

## 2024-05-16 PROCEDURE — G8419 CALC BMI OUT NRM PARAM NOF/U: HCPCS | Performed by: INTERNAL MEDICINE

## 2024-05-16 PROCEDURE — 1036F TOBACCO NON-USER: CPT | Performed by: INTERNAL MEDICINE

## 2024-05-16 PROCEDURE — 99211 OFF/OP EST MAY X REQ PHY/QHP: CPT

## 2024-05-16 NOTE — PROGRESS NOTES
MA Rooming Questions  Patient: Scot Durand  MRN: [id number]    Date: 5/16/2024        1. Do you have any new issues?   no         2. Do you need any refills on medications?    no    3. Have you had any imaging done since your last visit?   yes - PET    4. Have you been hospitalized or seen in the emergency room since your last visit here?   no    5. Did the patient have a depression screening completed today? No    No data recorded     PHQ-9 Given to (if applicable):               PHQ-9 Score (if applicable):                     [] Positive     []  Negative              Does question #9 need addressed (if applicable)                     [] Yes    []  No               Rhoda Mederos CMA

## 2024-05-23 ENCOUNTER — TELEPHONE (OUTPATIENT)
Dept: CARDIOLOGY CLINIC | Age: 59
End: 2024-05-23

## 2024-05-23 NOTE — TELEPHONE ENCOUNTER
----- Message from SARAH Christianson CNP sent at 5/23/2024  3:31 PM EDT -----  Courtney please let him know only 33% on ANDREW Espinosan-please schedule EPS evaluation for up grade discussion to BIVICD from Biv PPM  ----- Message -----  From: Elan Shin MD  Sent: 5/22/2024   3:24 PM EDT  To: SARAH Christianson CNP

## 2024-05-23 NOTE — TELEPHONE ENCOUNTER
Nuclear cardiac blood pool MUGA rest only study    : LVEF was 33%    Global Hypokinesis noted     LVEF was 33%      Next appt 06/18/24  1130a  Pt only

## 2024-05-24 ENCOUNTER — TELEPHONE (OUTPATIENT)
Dept: CARDIOLOGY CLINIC | Age: 59
End: 2024-05-24

## 2024-05-24 NOTE — TELEPHONE ENCOUNTER
Called pt in regards to results. Pt informed of plan and next ov. Pt voiced understanding at this time.          : LVEF was 33%    Global Hypokinesis noted     LVEF was 33%

## 2024-05-28 ENCOUNTER — COMMUNITY OUTREACH (OUTPATIENT)
Dept: FAMILY MEDICINE CLINIC | Age: 59
End: 2024-05-28

## 2024-06-03 ENCOUNTER — TELEPHONE (OUTPATIENT)
Dept: CARDIOLOGY CLINIC | Age: 59
End: 2024-06-03

## 2024-06-03 DIAGNOSIS — I51.9 SYSTOLIC DYSFUNCTION: ICD-10-CM

## 2024-06-03 NOTE — TELEPHONE ENCOUNTER
Spoke with patient who agreed to apply for a weston through the [employer]. Patient has been approved. RX sent to pharmacy. Pharmacy card information given to pharmacist. RX went through for $0 copay. Patient advised and voiced understanding.     Pharmacy Card  CARD NO.  [id number]     CARD STATUS  Active     BIN  [id number]     N  [id number]      GROUP  [id number]

## 2024-06-03 NOTE — TELEPHONE ENCOUNTER
----- Message from SARAH Christianson - CNP sent at 4/30/2024 12:25 PM EDT -----  Regarding: Jardiance  Needs assistance with jardiance- weston?

## 2024-06-07 ENCOUNTER — OFFICE VISIT (OUTPATIENT)
Dept: CARDIOLOGY CLINIC | Age: 59
End: 2024-06-07

## 2024-06-07 VITALS
HEIGHT: 69 IN | SYSTOLIC BLOOD PRESSURE: 108 MMHG | WEIGHT: 170 LBS | DIASTOLIC BLOOD PRESSURE: 62 MMHG | HEART RATE: 64 BPM | BODY MASS INDEX: 25.18 KG/M2

## 2024-06-07 DIAGNOSIS — I45.9 HEART BLOCK: Primary | ICD-10-CM

## 2024-06-07 DIAGNOSIS — I44.2 COMPLETE HEART BLOCK (HCC): ICD-10-CM

## 2024-06-07 ASSESSMENT — ENCOUNTER SYMPTOMS
DIARRHEA: 0
CHEST TIGHTNESS: 0
BACK PAIN: 0
BLOOD IN STOOL: 0
COLOR CHANGE: 0
EYE PAIN: 0
COUGH: 0
SHORTNESS OF BREATH: 0
PHOTOPHOBIA: 0
WHEEZING: 0
VOMITING: 0
CONSTIPATION: 0
ABDOMINAL PAIN: 0
NAUSEA: 0

## 2024-06-09 DIAGNOSIS — C61 PROSTATE CANCER METASTATIC TO BONE (HCC): Chronic | ICD-10-CM

## 2024-06-09 DIAGNOSIS — C79.51 PROSTATE CANCER METASTATIC TO BONE (HCC): Chronic | ICD-10-CM

## 2024-06-10 DIAGNOSIS — C79.51 PROSTATE CANCER METASTATIC TO BONE (HCC): Chronic | ICD-10-CM

## 2024-06-10 DIAGNOSIS — C61 PROSTATE CANCER METASTATIC TO BONE (HCC): Chronic | ICD-10-CM

## 2024-06-10 RX ORDER — PREDNISONE 5 MG/1
5 TABLET ORAL DAILY
Qty: 30 TABLET | Refills: 12 | OUTPATIENT
Start: 2024-06-10

## 2024-06-10 RX ORDER — PREDNISONE 5 MG/1
5 TABLET ORAL DAILY
Qty: 30 TABLET | Refills: 12 | Status: SHIPPED | OUTPATIENT
Start: 2024-06-10

## 2024-06-10 NOTE — TELEPHONE ENCOUNTER
Patient contacted our office in need of refill for prednisone. Patient has a scheduled appointment on 8/23. Patients preferred pharmacy is [facility]. Pending for patients chart provider brenda valverde.

## 2024-06-12 DIAGNOSIS — I51.9 SYSTOLIC DYSFUNCTION: ICD-10-CM

## 2024-06-12 RX ORDER — SPIRONOLACTONE 25 MG/1
25 TABLET ORAL DAILY
Qty: 30 TABLET | Refills: 5 | Status: SHIPPED | OUTPATIENT
Start: 2024-06-12

## 2024-06-13 DIAGNOSIS — I51.9 SYSTOLIC DYSFUNCTION: ICD-10-CM

## 2024-06-13 RX ORDER — SPIRONOLACTONE 25 MG/1
25 TABLET ORAL DAILY
Qty: 30 TABLET | Refills: 5 | Status: CANCELLED | OUTPATIENT
Start: 2024-06-13

## 2024-06-13 RX ORDER — SPIRONOLACTONE 25 MG/1
25 TABLET ORAL DAILY
Qty: 30 TABLET | Refills: 5 | OUTPATIENT
Start: 2024-06-13

## 2024-06-13 NOTE — TELEPHONE ENCOUNTER
Spoke with patient he stated his sister cancelled the request for her meds. I called [facility] are refilling patients medication for him. Patient is aware

## 2024-06-18 RX ORDER — LISINOPRIL 30 MG/1
30 TABLET ORAL DAILY
Qty: 90 TABLET | Refills: 3 | Status: SHIPPED | OUTPATIENT
Start: 2024-06-18

## 2024-06-20 ENCOUNTER — OFFICE VISIT (OUTPATIENT)
Dept: CARDIOLOGY CLINIC | Age: 59
End: 2024-06-20
Payer: MEDICARE

## 2024-06-20 VITALS
HEART RATE: 68 BPM | HEIGHT: 69 IN | DIASTOLIC BLOOD PRESSURE: 80 MMHG | SYSTOLIC BLOOD PRESSURE: 140 MMHG | RESPIRATION RATE: 16 BRPM | OXYGEN SATURATION: 99 % | BODY MASS INDEX: 25.09 KG/M2

## 2024-06-20 DIAGNOSIS — Z86.002 HISTORY OF CARCINOMA IN SITU OF PROSTATE: ICD-10-CM

## 2024-06-20 DIAGNOSIS — I45.9 HEART BLOCK: Primary | ICD-10-CM

## 2024-06-20 DIAGNOSIS — I42.8 NONISCHEMIC CARDIOMYOPATHY (HCC): Primary | ICD-10-CM

## 2024-06-20 DIAGNOSIS — Z95.0 BIVENTRICULAR CARDIAC PACEMAKER IN SITU: ICD-10-CM

## 2024-06-20 DIAGNOSIS — I42.8 NON-ISCHEMIC CARDIOMYOPATHY (HCC): ICD-10-CM

## 2024-06-20 PROCEDURE — 3017F COLORECTAL CA SCREEN DOC REV: CPT | Performed by: NURSE PRACTITIONER

## 2024-06-20 PROCEDURE — G8419 CALC BMI OUT NRM PARAM NOF/U: HCPCS | Performed by: NURSE PRACTITIONER

## 2024-06-20 PROCEDURE — 1036F TOBACCO NON-USER: CPT | Performed by: NURSE PRACTITIONER

## 2024-06-20 PROCEDURE — G8427 DOCREV CUR MEDS BY ELIG CLIN: HCPCS | Performed by: NURSE PRACTITIONER

## 2024-06-20 PROCEDURE — 99214 OFFICE O/P EST MOD 30 MIN: CPT | Performed by: NURSE PRACTITIONER

## 2024-06-20 ASSESSMENT — ENCOUNTER SYMPTOMS
COUGH: 0
SHORTNESS OF BREATH: 0

## 2024-06-20 NOTE — PROGRESS NOTES
CHF CLINICAL STAFF DOCUMENTATION    Have you had any Chest Pain? - No    Do you had any Shortness of Breath - No  If Yes - When none    Have you had any dizziness - No  When do you feel dizzy no  How long does it last .none  none     Do you have any edema -  No  swelling in none      Are you on fluid restrictions - Yes    Amount - 64 oz     Are you on sodium restrictions - Yes   Amount - 2 gm    Extra diuretic use -  No     Do you weigh yourself daily? -  unable to stand     Do you feel fatigued - No    Do you have a cough - No    Lung sounds -    Normal - Yes   Abnormal -     Do you have abdominal bloating - No    How is your appetite - good    Do you have difficulty sleeping - No  Able to lie flat? -  sleeps propped up     Do you have a history of sleep apnea - No  CPAP no    6 min. Walk:  In wheelchair - Veena LINDO   Pre heart rate   Time walked  6 minutes   Distance    Post heart rate        Have you had Flu Vaccine - Yes    Have you had Pneumonia Vaccine - Yes

## 2024-06-20 NOTE — PROGRESS NOTES
[facility]      Visit Date: 6/20/2024  Cardiologist:  Dr. Meier  Primary Care Physician: Brittanie Kang DO    Scot Durand is a 59 y.o. male who presents today for:  CC:   1. Nonischemic cardiomyopathy (HCC)    2. Biventricular cardiac pacemaker in situ              HPI:   Scot Durand is a 59 y.o. male who presents to the office for a new patient visit in the heart failure clinic.    He was seen in Hospital for 3rd degree heart block with systolic dysfunction. Biv PPM placed at that time. He is doing better. Has been here for medication adjustment there has been slight improvement of EF, but given EF remains < 35% per MUGA scan 5/2024 aND HE HAS nsvt PLANNED FOR icd UPGRADE SOON.     Chief Complaint   Patient presents with    Congestive Heart Failure     2 month f/u HF      Patient has:  Last hospital admission related to Heart Failure:  10/2023   baseline BNP 1071     baseline weight 170a lb     Device: Biv PPM        Activity: poor - wheel chair bound due to muscular dystrophy     NYHA Class: III       Sodium Restrictions: 2g  Fluid Restrictions: 48-64 oz/day  Sodium and fluid restriction compliance: good. He reports sister is managing his sodium intake for him well.    Past Medical History:   Diagnosis Date    Cancer (HCC) Prostate to the bone    Mixed hyperlipidemia 10/24/2019     Past Surgical History:   Procedure Laterality Date    FACIAL RECONSTRUCTION SURGERY N/A 1986    FRACTURE SURGERY  nose    NOSE SURGERY N/A 1986    PACEMAKER PLACEMENT Left 09/28/2023    [employer]tr[employer]: BiV PPM, Percepta Quad CRT-P-MRI Surescan    PROSTATE SURGERY  biopsy     Family History   Problem Relation Age of Onset    Arrhythmia Mother     Stroke Mother     Hypertension Mother     Breast Cancer Mother     Rheum Arthritis Mother     Colon Cancer Father     No Known Problems Sister     Hypertension Maternal Grandmother     Cancer Maternal Grandmother      Social History     Tobacco Use    Smoking

## 2024-07-08 ENCOUNTER — HOSPITAL ENCOUNTER (OUTPATIENT)
Dept: GENERAL RADIOLOGY | Age: 59
Discharge: HOME | End: 2024-07-08
Payer: MEDICARE

## 2024-07-08 ENCOUNTER — HOSPITAL ENCOUNTER (OUTPATIENT)
Age: 59
Discharge: HOME | End: 2024-07-08
Payer: MEDICARE

## 2024-07-08 ENCOUNTER — OFFICE VISIT (OUTPATIENT)
Dept: CARDIOLOGY CLINIC | Age: 59
End: 2024-07-08
Payer: MEDICARE

## 2024-07-08 VITALS
SYSTOLIC BLOOD PRESSURE: 118 MMHG | DIASTOLIC BLOOD PRESSURE: 66 MMHG | HEART RATE: 60 BPM | BODY MASS INDEX: 25.1 KG/M2 | HEIGHT: 69 IN

## 2024-07-08 DIAGNOSIS — G71.01 BECKER'S MUSCULAR DYSTROPHY (HCC): ICD-10-CM

## 2024-07-08 DIAGNOSIS — I50.22 CHRONIC SYSTOLIC (CONGESTIVE) HEART FAILURE (HCC): ICD-10-CM

## 2024-07-08 DIAGNOSIS — I45.9 HEART BLOCK: ICD-10-CM

## 2024-07-08 DIAGNOSIS — I42.8 NONISCHEMIC CARDIOMYOPATHY (HCC): Primary | ICD-10-CM

## 2024-07-08 DIAGNOSIS — I44.2 COMPLETE HEART BLOCK (HCC): ICD-10-CM

## 2024-07-08 DIAGNOSIS — Z86.002 HISTORY OF CARCINOMA IN SITU OF PROSTATE: ICD-10-CM

## 2024-07-08 DIAGNOSIS — I42.8 NON-ISCHEMIC CARDIOMYOPATHY (HCC): ICD-10-CM

## 2024-07-08 DIAGNOSIS — I49.5 SICK SINUS SYNDROME (HCC): ICD-10-CM

## 2024-07-08 LAB
ANION GAP SERPL CALCULATED.3IONS-SCNC: 17 MMOL/L (ref 7–16)
APTT: 28 SECONDS (ref 25.1–37.1)
BUN SERPL-MCNC: 13 MG/DL (ref 6–23)
CALCIUM SERPL-MCNC: 9.5 MG/DL (ref 8.3–10.6)
CHLORIDE BLD-SCNC: 102 MMOL/L (ref 99–110)
CO2: 22 MMOL/L (ref 21–32)
CREAT SERPL-MCNC: 0.3 MG/DL (ref 0.9–1.3)
GFR, ESTIMATED: >90 ML/MIN/1.73M2
GLUCOSE SERPL-MCNC: 128 MG/DL (ref 70–99)
HCT VFR BLD CALC: 43.9 % (ref 42–52)
HEMOGLOBIN: 14 GM/DL (ref 13.5–18)
INR BLD: 1 INDEX
MAGNESIUM: 2.3 MG/DL (ref 1.8–2.4)
MCH RBC QN AUTO: 30 PG (ref 27–31)
MCHC RBC AUTO-ENTMCNC: 31.9 % (ref 32–36)
MCV RBC AUTO: 94 FL (ref 78–100)
PDW BLD-RTO: 14 % (ref 11.7–14.9)
PHOSPHORUS: 4.4 MG/DL (ref 2.5–4.9)
PLATELET # BLD: 176 K/CU MM (ref 140–440)
PMV BLD AUTO: 12.1 FL (ref 7.5–11.1)
POTASSIUM SERPL-SCNC: 3.9 MMOL/L (ref 3.5–5.1)
PROTHROMBIN TIME: 13.6 SECONDS (ref 11.7–14.5)
RBC # BLD: 4.67 M/CU MM (ref 4.6–6.2)
SODIUM BLD-SCNC: 141 MMOL/L (ref 135–145)
WBC # BLD: 7.1 K/CU MM (ref 4–10.5)

## 2024-07-08 PROCEDURE — 85610 PROTHROMBIN TIME: CPT

## 2024-07-08 PROCEDURE — 80048 BASIC METABOLIC PNL TOTAL CA: CPT

## 2024-07-08 PROCEDURE — 85730 THROMBOPLASTIN TIME PARTIAL: CPT

## 2024-07-08 PROCEDURE — 85027 COMPLETE CBC AUTOMATED: CPT

## 2024-07-08 PROCEDURE — 71046 X-RAY EXAM CHEST 2 VIEWS: CPT

## 2024-07-08 PROCEDURE — 84100 ASSAY OF PHOSPHORUS: CPT

## 2024-07-08 PROCEDURE — G8428 CUR MEDS NOT DOCUMENT: HCPCS | Performed by: INTERNAL MEDICINE

## 2024-07-08 PROCEDURE — 36415 COLL VENOUS BLD VENIPUNCTURE: CPT

## 2024-07-08 PROCEDURE — G8419 CALC BMI OUT NRM PARAM NOF/U: HCPCS | Performed by: INTERNAL MEDICINE

## 2024-07-08 PROCEDURE — 99214 OFFICE O/P EST MOD 30 MIN: CPT | Performed by: INTERNAL MEDICINE

## 2024-07-08 PROCEDURE — 1036F TOBACCO NON-USER: CPT | Performed by: INTERNAL MEDICINE

## 2024-07-08 PROCEDURE — 83735 ASSAY OF MAGNESIUM: CPT

## 2024-07-08 PROCEDURE — 3017F COLORECTAL CA SCREEN DOC REV: CPT | Performed by: INTERNAL MEDICINE

## 2024-07-08 NOTE — PROGRESS NOTES
CARDIOLOGY  NOTE    Chief Complaint: Severe cardiomyopathy s/p BiV    HPI:   Scot is a 59 y.o. year old who has Past medical history as noted below.  Very pleasant gentleman who has history of muscular dystrophy and is wheelchair-bound he was recently started on chemotherapy Zytiga for prostate cancer   He uses  a motorized wheel chair   He got admitted in September 2023 after syncope passing out event and complete heart block and underlying rhythm of atrial flutter so he underwent BI v pacer  During workup echo revealed EF of 20% cardiac cath revealed no significant obstructive coronary artery disease he has been started on guideline recommended medical therapy currently says he is tolerating lisinopril 30 mg and Toprol-XL 50  mg he is got a BiV pacer will need ICD upgrade       Current Outpatient Medications   Medication Sig Dispense Refill    lisinopril (PRINIVIL;ZESTRIL) 30 MG tablet Take 1 tablet by mouth daily 90 tablet 3    spironolactone (ALDACTONE) 25 MG tablet Take 1 tablet by mouth daily 30 tablet 5    predniSONE (DELTASONE) 5 MG tablet Take 1 tablet by mouth daily 30 tablet 12    empagliflozin (JARDIANCE) 10 MG tablet Take 1 tablet by mouth daily 90 tablet 3    abiraterone acetate (ZYTIGA) 250 MG tablet TAKE 4 TABLETS (1000MG TOTAL) BY MOUTH ONCE DAILY. 120 tablet 3    metoprolol succinate (TOPROL XL) 50 MG extended release tablet Take 1 tablet by mouth daily 90 tablet 4    Leuprolide Acetate (LUPRON IJ) Inject as directed Indications: pt reports every 4 months 09/28/23 Patient reports he is due in October      calcium carbonate (OSCAL) 500 MG TABS tablet Take 3 tablets by mouth every other day Taking 500 mg every other day      zoledronic acid (ZOMETA) 4 MG/5ML injection Infuse 5 mLs intravenously every 6 months 09/28/23 patient reports he is due in January       No current facility-administered medications for this visit.       Allergies:   Bupropion and

## 2024-07-08 NOTE — PATIENT INSTRUCTIONS
We are committed to providing you the best care possible.    If you receive a survey after visiting one of our offices, please take time to share your experience concerning your physician office visit.  These surveys are confidential and no health information about you is shared.    We are eager to improve for you and we are counting on your feedback to help make that happen.    **It is YOUR responsibilty to bring medication bottles and/or updated medication list to EACH APPOINTMENT. This will allow us to better serve you and all your healthcare needs**  Thank you for allowing us to care for you today!   We want to ensure we can follow your treatment plan and we strive to give you the best outcomes and experience possible.   If you ever have a life threatening emergency and call 911 - for an ambulance (EMS)   Our providers can only care for you at:   [facility] or [facility].   Even if you have someone take you or you drive yourself we can only care for you in a [facility] facility. Our providers are not setup at the other healthcare locations!   Please be informed that if you contact our office outside of normal business hours the physician on call cannot help with any scheduling or rescheduling issues, procedure instruction questions or any type of medication issue.    We advise you for any urgent/emergency that you go to the nearest emergency room!    PLEASE CALL OUR OFFICE DURING NORMAL BUSINESS HOURS    Monday - Friday   8 am to 5 pm    [place]: [phone]    [place]: [phone]    [place]:  [phone]

## 2024-07-11 ENCOUNTER — HOSPITAL ENCOUNTER (OUTPATIENT)
Dept: INFUSION THERAPY | Age: 59
Discharge: HOME | End: 2024-07-11
Payer: MEDICARE

## 2024-07-11 DIAGNOSIS — C61 MALIGNANT NEOPLASM OF PROSTATE (HCC): Primary | ICD-10-CM

## 2024-07-11 PROCEDURE — 6360000002 HC RX W HCPCS: Performed by: INTERNAL MEDICINE

## 2024-07-11 PROCEDURE — 96402 CHEMO HORMON ANTINEOPL SQ/IM: CPT

## 2024-07-11 RX ADMIN — LEUPROLIDE ACETATE 22.5 MG: 22.5 INJECTION, SUSPENSION, EXTENDED RELEASE SUBCUTANEOUS at 11:56

## 2024-07-14 DIAGNOSIS — I51.9 SYSTOLIC DYSFUNCTION: ICD-10-CM

## 2024-07-15 RX ORDER — SPIRONOLACTONE 25 MG/1
25 TABLET ORAL DAILY
Qty: 30 TABLET | Refills: 5 | Status: SHIPPED | OUTPATIENT
Start: 2024-07-15

## 2024-07-16 ENCOUNTER — NURSE ONLY (OUTPATIENT)
Dept: CARDIOLOGY CLINIC | Age: 59
End: 2024-07-16

## 2024-07-16 DIAGNOSIS — Z95.0 PACEMAKER: Primary | ICD-10-CM

## 2024-07-16 NOTE — PROGRESS NOTES
Patient here in office and educated on 7/16/2024, scheduled for Biventricular Implantable Cardioverter Defibrillator Implant Upgrade on 7/17/2024 @ 1130am, with arrival @ 0930am, @ [facility]; consents signed. Copy of orders given for labs and CXR due 7/16/2024 at [facility], [facility] or [facility]. Instructions given to patient to :NPO after midnight including water the night before procedure; call hospital at [phone] to pre-register. May take rest of morning medications day of procedure except the following; Hold Aldactone (Spironalactone) the morning of the procedure. Advised patient he will need a  for procedure.  Patient voiced understanding. Copies of consent & info scanned in chart.

## 2024-07-17 ENCOUNTER — APPOINTMENT (OUTPATIENT)
Dept: NON INVASIVE DIAGNOSTICS | Age: 59
End: 2024-07-17
Attending: INTERNAL MEDICINE
Payer: MEDICARE

## 2024-07-17 ENCOUNTER — PROCEDURE VISIT (OUTPATIENT)
Dept: CARDIOLOGY CLINIC | Age: 59
End: 2024-07-17

## 2024-07-17 ENCOUNTER — APPOINTMENT (OUTPATIENT)
Dept: GENERAL RADIOLOGY | Age: 59
End: 2024-07-17
Attending: INTERNAL MEDICINE
Payer: MEDICARE

## 2024-07-17 ENCOUNTER — HOSPITAL ENCOUNTER (OUTPATIENT)
Age: 59
Setting detail: OBSERVATION
LOS: 1 days | Discharge: HOME | End: 2024-07-18
Attending: INTERNAL MEDICINE | Admitting: INTERNAL MEDICINE
Payer: MEDICARE

## 2024-07-17 DIAGNOSIS — I45.9 HEART BLOCK: ICD-10-CM

## 2024-07-17 DIAGNOSIS — Z95.810 BIVENTRICULAR ICD (IMPLANTABLE CARDIOVERTER-DEFIBRILLATOR) IN PLACE: ICD-10-CM

## 2024-07-17 DIAGNOSIS — Z95.0 BIVENTRICULAR CARDIAC PACEMAKER IN SITU: Primary | ICD-10-CM

## 2024-07-17 DIAGNOSIS — Z95.0 PACEMAKER: Primary | ICD-10-CM

## 2024-07-17 LAB
ABO/RH: NORMAL
ANTIBODY SCREEN: NEGATIVE
ECHO AO ROOT DIAM: 3.5 CM
ECHO AO ROOT INDEX: 1.81 CM/M2
ECHO BSA: 1.94 M2
ECHO BSA: 1.94 M2
ECHO IVC PROX: 1.3 CM
ECHO LA DIAMETER INDEX: 1.92 CM/M2
ECHO LA DIAMETER: 3.7 CM
ECHO LA TO AORTIC ROOT RATIO: 1.06
ECHO LV EDV A4C: 146 ML
ECHO LV EDV INDEX A4C: 76 ML/M2
ECHO LV EJECTION FRACTION A4C: 31 %
ECHO LV ESV A4C: 101 ML
ECHO LV ESV INDEX A4C: 52 ML/M2
ECHO LV FRACTIONAL SHORTENING: 17 % (ref 28–44)
ECHO LV INTERNAL DIMENSION DIASTOLE INDEX: 3.01 CM/M2
ECHO LV INTERNAL DIMENSION DIASTOLIC: 5.8 CM (ref 4.2–5.9)
ECHO LV INTERNAL DIMENSION SYSTOLIC INDEX: 2.49 CM/M2
ECHO LV INTERNAL DIMENSION SYSTOLIC: 4.8 CM
ECHO LV IVSD: 0.8 CM (ref 0.6–1)
ECHO LV MASS 2D: 161.9 G (ref 88–224)
ECHO LV MASS INDEX 2D: 83.9 G/M2 (ref 49–115)
ECHO LV POSTERIOR WALL DIASTOLIC: 0.7 CM (ref 0.6–1)
ECHO LV RELATIVE WALL THICKNESS RATIO: 0.24
ECHO LVOT AREA: 3.8 CM2
ECHO LVOT DIAM: 2.2 CM
ECHO RV MID DIMENSION: 3 CM
ECHO TV REGURGITANT MAX VELOCITY: 1.26 M/S
ECHO TV REGURGITANT PEAK GRADIENT: 6 MMHG

## 2024-07-17 PROCEDURE — 93325 DOPPLER ECHO COLOR FLOW MAPG: CPT | Performed by: INTERNAL MEDICINE

## 2024-07-17 PROCEDURE — 2500000003 HC RX 250 WO HCPCS: Performed by: INTERNAL MEDICINE

## 2024-07-17 PROCEDURE — 2580000003 HC RX 258: Performed by: INTERNAL MEDICINE

## 2024-07-17 PROCEDURE — 33249 INSJ/RPLCMT DEFIB W/LEAD(S): CPT | Performed by: INTERNAL MEDICINE

## 2024-07-17 PROCEDURE — 71045 X-RAY EXAM CHEST 1 VIEW: CPT

## 2024-07-17 PROCEDURE — 33233 REMOVAL OF PM GENERATOR: CPT | Performed by: INTERNAL MEDICINE

## 2024-07-17 PROCEDURE — 6360000002 HC RX W HCPCS: Performed by: NURSE PRACTITIONER

## 2024-07-17 PROCEDURE — 6370000000 HC RX 637 (ALT 250 FOR IP): Performed by: NURSE PRACTITIONER

## 2024-07-17 PROCEDURE — 2709999900 HC NON-CHARGEABLE SUPPLY: Performed by: INTERNAL MEDICINE

## 2024-07-17 PROCEDURE — 93308 TTE F-UP OR LMTD: CPT | Performed by: INTERNAL MEDICINE

## 2024-07-17 PROCEDURE — 86850 RBC ANTIBODY SCREEN: CPT

## 2024-07-17 PROCEDURE — 7100000010 HC PHASE II RECOVERY - FIRST 15 MIN: Performed by: INTERNAL MEDICINE

## 2024-07-17 PROCEDURE — 6360000002 HC RX W HCPCS

## 2024-07-17 PROCEDURE — 2700000000 HC OXYGEN THERAPY PER DAY

## 2024-07-17 PROCEDURE — C1894 INTRO/SHEATH, NON-LASER: HCPCS | Performed by: INTERNAL MEDICINE

## 2024-07-17 PROCEDURE — 86900 BLOOD TYPING SEROLOGIC ABO: CPT

## 2024-07-17 PROCEDURE — 2500000003 HC RX 250 WO HCPCS

## 2024-07-17 PROCEDURE — 33235 REMOVAL PACEMAKER ELECTRODE: CPT | Performed by: INTERNAL MEDICINE

## 2024-07-17 PROCEDURE — C1889 IMPLANT/INSERT DEVICE, NOC: HCPCS | Performed by: INTERNAL MEDICINE

## 2024-07-17 PROCEDURE — A4217 STERILE WATER/SALINE, 500 ML: HCPCS | Performed by: INTERNAL MEDICINE

## 2024-07-17 PROCEDURE — 93321 DOPPLER ECHO F-UP/LMTD STD: CPT

## 2024-07-17 PROCEDURE — C1898 LEAD, PMKR, OTHER THAN TRANS: HCPCS | Performed by: INTERNAL MEDICINE

## 2024-07-17 PROCEDURE — C1892 INTRO/SHEATH,FIXED,PEEL-AWAY: HCPCS | Performed by: INTERNAL MEDICINE

## 2024-07-17 PROCEDURE — 2580000003 HC RX 258: Performed by: NURSE PRACTITIONER

## 2024-07-17 PROCEDURE — G0378 HOSPITAL OBSERVATION PER HR: HCPCS

## 2024-07-17 PROCEDURE — 6360000002 HC RX W HCPCS: Performed by: INTERNAL MEDICINE

## 2024-07-17 PROCEDURE — C1773 RET DEV, INSERTABLE: HCPCS | Performed by: INTERNAL MEDICINE

## 2024-07-17 PROCEDURE — 94761 N-INVAS EAR/PLS OXIMETRY MLT: CPT

## 2024-07-17 PROCEDURE — 6360000004 HC RX CONTRAST MEDICATION

## 2024-07-17 PROCEDURE — C1882 AICD, OTHER THAN SING/DUAL: HCPCS | Performed by: INTERNAL MEDICINE

## 2024-07-17 PROCEDURE — 93321 DOPPLER ECHO F-UP/LMTD STD: CPT | Performed by: INTERNAL MEDICINE

## 2024-07-17 PROCEDURE — 86901 BLOOD TYPING SEROLOGIC RH(D): CPT

## 2024-07-17 PROCEDURE — 6360000004 HC RX CONTRAST MEDICATION: Performed by: INTERNAL MEDICINE

## 2024-07-17 PROCEDURE — C1777 LEAD, AICD, ENDO SINGLE COIL: HCPCS | Performed by: INTERNAL MEDICINE

## 2024-07-17 DEVICE — ENVELOPE CMRM6133 ABSORB LRG MR
Type: IMPLANTABLE DEVICE | Site: CHEST | Status: FUNCTIONAL
Brand: TYRX™

## 2024-07-17 DEVICE — LEAD 6935M62 QUATTRO SECURE S MRI US
Type: IMPLANTABLE DEVICE | Site: HEART | Status: FUNCTIONAL
Brand: SPRINT QUATTRO SECURE S MRI™ SURESCAN™

## 2024-07-17 DEVICE — CRTD DTPA2QQ COBALT XT HF QUAD MRI DF4
Type: IMPLANTABLE DEVICE | Site: CHEST | Status: FUNCTIONAL
Brand: COBALT™ XT HF QUAD CRT-D MRI SURESCAN™

## 2024-07-17 DEVICE — LEAD 5076-52 MRI US RCMCRD
Type: IMPLANTABLE DEVICE | Site: HEART | Status: FUNCTIONAL
Brand: CAPSUREFIX NOVUS MRI™ SURESCAN®

## 2024-07-17 RX ORDER — SODIUM CHLORIDE 0.9 % (FLUSH) 0.9 %
5-40 SYRINGE (ML) INJECTION PRN
Status: DISCONTINUED | OUTPATIENT
Start: 2024-07-17 | End: 2024-07-18 | Stop reason: HOSPADM

## 2024-07-17 RX ORDER — FENTANYL CITRATE 50 UG/ML
INJECTION, SOLUTION INTRAMUSCULAR; INTRAVENOUS PRN
Status: DISCONTINUED | OUTPATIENT
Start: 2024-07-17 | End: 2024-07-17 | Stop reason: HOSPADM

## 2024-07-17 RX ORDER — METOPROLOL SUCCINATE 50 MG/1
50 TABLET, EXTENDED RELEASE ORAL DAILY
Status: DISCONTINUED | OUTPATIENT
Start: 2024-07-17 | End: 2024-07-18 | Stop reason: HOSPADM

## 2024-07-17 RX ORDER — SODIUM CHLORIDE 0.9 % (FLUSH) 0.9 %
5-40 SYRINGE (ML) INJECTION EVERY 12 HOURS SCHEDULED
Status: DISCONTINUED | OUTPATIENT
Start: 2024-07-17 | End: 2024-07-18 | Stop reason: HOSPADM

## 2024-07-17 RX ORDER — ABIRATERONE ACETATE 250 MG/1
1000 TABLET ORAL DAILY
Status: DISCONTINUED | OUTPATIENT
Start: 2024-07-18 | End: 2024-07-18 | Stop reason: HOSPADM

## 2024-07-17 RX ORDER — TRAMADOL HYDROCHLORIDE 50 MG/1
50 TABLET ORAL EVERY 6 HOURS PRN
Status: DISCONTINUED | OUTPATIENT
Start: 2024-07-17 | End: 2024-07-18 | Stop reason: HOSPADM

## 2024-07-17 RX ORDER — ACETAMINOPHEN 325 MG/1
650 TABLET ORAL EVERY 4 HOURS PRN
Status: DISCONTINUED | OUTPATIENT
Start: 2024-07-17 | End: 2024-07-18 | Stop reason: HOSPADM

## 2024-07-17 RX ORDER — SODIUM CHLORIDE 9 MG/ML
INJECTION, SOLUTION INTRAVENOUS CONTINUOUS PRN
Status: COMPLETED | OUTPATIENT
Start: 2024-07-17 | End: 2024-07-17

## 2024-07-17 RX ORDER — SPIRONOLACTONE 50 MG/1
25 TABLET, FILM COATED ORAL DAILY
Status: DISCONTINUED | OUTPATIENT
Start: 2024-07-17 | End: 2024-07-18 | Stop reason: HOSPADM

## 2024-07-17 RX ORDER — SODIUM CHLORIDE 9 MG/ML
INJECTION, SOLUTION INTRAVENOUS PRN
Status: DISCONTINUED | OUTPATIENT
Start: 2024-07-17 | End: 2024-07-18 | Stop reason: HOSPADM

## 2024-07-17 RX ORDER — MIDAZOLAM HYDROCHLORIDE 1 MG/ML
INJECTION INTRAMUSCULAR; INTRAVENOUS PRN
Status: DISCONTINUED | OUTPATIENT
Start: 2024-07-17 | End: 2024-07-17 | Stop reason: HOSPADM

## 2024-07-17 RX ORDER — PREDNISONE 5 MG/1
5 TABLET ORAL DAILY
Status: DISCONTINUED | OUTPATIENT
Start: 2024-07-17 | End: 2024-07-18 | Stop reason: HOSPADM

## 2024-07-17 RX ADMIN — SODIUM CHLORIDE, PRESERVATIVE FREE 10 ML: 5 INJECTION INTRAVENOUS at 20:36

## 2024-07-17 RX ADMIN — WATER 2000 MG: 1 INJECTION INTRAMUSCULAR; INTRAVENOUS; SUBCUTANEOUS at 20:36

## 2024-07-17 RX ADMIN — PREDNISONE 5 MG: 5 TABLET ORAL at 16:26

## 2024-07-17 RX ADMIN — METOPROLOL SUCCINATE 50 MG: 50 TABLET, FILM COATED, EXTENDED RELEASE ORAL at 16:26

## 2024-07-17 ASSESSMENT — ENCOUNTER SYMPTOMS
DIARRHEA: 0
ABDOMINAL PAIN: 0
NAUSEA: 0
EYE PAIN: 0
COLOR CHANGE: 0
VOMITING: 0
WHEEZING: 0
BACK PAIN: 0
PHOTOPHOBIA: 0
CONSTIPATION: 0
SHORTNESS OF BREATH: 0
CHEST TIGHTNESS: 0
BLOOD IN STOOL: 0
COUGH: 0

## 2024-07-17 ASSESSMENT — PAIN DESCRIPTION - ORIENTATION: ORIENTATION: LEFT

## 2024-07-17 ASSESSMENT — PAIN SCALES - GENERAL
PAINLEVEL_OUTOF10: 4
PAINLEVEL_OUTOF10: 0

## 2024-07-17 ASSESSMENT — PAIN DESCRIPTION - LOCATION: LOCATION: OTHER (COMMENT)

## 2024-07-17 ASSESSMENT — PAIN DESCRIPTION - PAIN TYPE: TYPE: ACUTE PAIN

## 2024-07-17 ASSESSMENT — PAIN DESCRIPTION - DESCRIPTORS: DESCRIPTORS: DISCOMFORT

## 2024-07-17 NOTE — H&P
stroke, myocardial infarction and death were discussed in detail. The patient opted to proceed with the device implantation.        Thanks again for allowing me to participate in care of this patient. Please call me if you have any questions.    With best regards.      Bhavik Dean MD, 7/17/2024 9:42 AM     Please note this report has been partially produced using speech recognition software and may contain errors related to that system including errors in grammar, punctuation, and spelling, as well as words and phrases that may be inappropriate. If there are any questions or concerns please feel free to contact the dictating provider for clarification.

## 2024-07-17 NOTE — PROGRESS NOTES
4 Eyes Skin Assessment     NAME:  Scot Durand  YOB: 1965  MEDICAL RECORD NUMBER:  [id number]    The patient is being assessed for  Admission    I agree that at least one RN has performed a thorough Head to Toe Skin Assessment on the patient. ALL assessment sites listed below have been assessed.      Areas assessed by both nurses:    Head, Face, Ears, Shoulders, Back, Chest, Arms, Elbows, Hands, Sacrum. Buttock, Coccyx, Ischium, Legs. Feet and Heels, and Under Medical Devices         Does the Patient have a Wound? No noted wound(s)       Sukhjnider Prevention initiated by RN: No  Wound Care Orders initiated by RN: No    Pressure Injury (Stage 3,4, Unstageable, DTI, NWPT, and Complex wounds) if present, place Wound referral order by RN under : No    New Ostomies, if present place, Ostomy referral order under : No     Nurse 1 eSignature: Electronically signed by Sonya Carroll RN on 7/17/24 at 6:54 PM EDT    **SHARE this note so that the co-signing nurse can place an eSignature**    Nurse 2 eSignature: {Esignature:[id number]}

## 2024-07-17 NOTE — PLAN OF CARE
Problem: Chronic Conditions and Co-morbidities  Goal: Patient's chronic conditions and co-morbidity symptoms are monitored and maintained or improved  Outcome: Progressing     Problem: Skin/Tissue Integrity  Goal: Absence of new skin breakdown  Description: 1.  Monitor for areas of redness and/or skin breakdown  2.  Assess vascular access sites hourly  3.  Every 4-6 hours minimum:  Change oxygen saturation probe site  4.  Every 4-6 hours:  If on nasal continuous positive airway pressure, respiratory therapy assess nares and determine need for appliance change or resting period.  Outcome: Progressing     Problem: Safety - Adult  Goal: Free from fall injury  Outcome: Progressing     Problem: Discharge Planning  Goal: Discharge to home or other facility with appropriate resources  Outcome: Progressing     Problem: Pain  Goal: Verbalizes/displays adequate comfort level or baseline comfort level  Outcome: Progressing

## 2024-07-18 VITALS
RESPIRATION RATE: 17 BRPM | WEIGHT: 172 LBS | BODY MASS INDEX: 25.48 KG/M2 | HEART RATE: 78 BPM | SYSTOLIC BLOOD PRESSURE: 122 MMHG | HEIGHT: 69 IN | TEMPERATURE: 97.8 F | DIASTOLIC BLOOD PRESSURE: 79 MMHG | OXYGEN SATURATION: 99 %

## 2024-07-18 PROCEDURE — 6360000002 HC RX W HCPCS: Performed by: NURSE PRACTITIONER

## 2024-07-18 PROCEDURE — 2580000003 HC RX 258: Performed by: NURSE PRACTITIONER

## 2024-07-18 PROCEDURE — 6370000000 HC RX 637 (ALT 250 FOR IP): Performed by: NURSE PRACTITIONER

## 2024-07-18 PROCEDURE — G0378 HOSPITAL OBSERVATION PER HR: HCPCS

## 2024-07-18 PROCEDURE — 94761 N-INVAS EAR/PLS OXIMETRY MLT: CPT

## 2024-07-18 RX ORDER — TRAMADOL HYDROCHLORIDE 50 MG/1
50 TABLET ORAL EVERY 8 HOURS PRN
Qty: 15 TABLET | Refills: 0 | Status: SHIPPED | OUTPATIENT
Start: 2024-07-18 | End: 2024-07-23

## 2024-07-18 RX ADMIN — PREDNISONE 5 MG: 5 TABLET ORAL at 10:11

## 2024-07-18 RX ADMIN — METOPROLOL SUCCINATE 50 MG: 50 TABLET, FILM COATED, EXTENDED RELEASE ORAL at 10:11

## 2024-07-18 RX ADMIN — WATER 2000 MG: 1 INJECTION INTRAMUSCULAR; INTRAVENOUS; SUBCUTANEOUS at 05:55

## 2024-07-18 RX ADMIN — EMPAGLIFLOZIN 10 MG: 10 TABLET, FILM COATED ORAL at 10:11

## 2024-07-18 RX ADMIN — LISINOPRIL 30 MG: 5 TABLET ORAL at 10:09

## 2024-07-18 RX ADMIN — SPIRONOLACTONE 25 MG: 50 TABLET ORAL at 10:10

## 2024-07-18 NOTE — DISCHARGE SUMMARY
[facility]  Discharge Summary    Scot Durand  :  1965  MRN:  [id number]    Admit date:  2024  Discharge date:      Admitting Physician:  Bhavik Dean MD    Discharge Diagnoses:     1. SP BIVICD upgrade         Patient Active Problem List   Diagnosis    Bronson's muscular dystrophy (HCC)    Prostate cancer metastatic to bone (HCC)    Anemia, unspecified    Malignant neoplasm of prostate (HCC)    Family history of malignant neoplasm of breast    Encounter for nonprocreative genetic counseling    Interstitial pulmonary disease, unspecified (HCC)    Hives    Syncope and collapse    Heart block    Complete heart block (HCC)    Nonischemic cardiomyopathy (HCC)    Type 2 diabetes mellitus    Chronic systolic (congestive) heart failure    Pacemaker    Sick sinus syndrome (HCC)    Biventricular ICD (implantable cardioverter-defibrillator) in place       Admission Condition:  fair    Discharged Condition:  good    Hospital Course:   Patient with hx of cardiomyopathy, complete heart block sp BIV pacemaker presented for implantation of BIVICD upgrade (Had RA and RV lead extraction and placement of new RA and RV defib lead). Patient tolerated the procedure well. Observed overnight. Patient device area was clean, no hematoma and no tenderness. Patient device interrogation was stable. CXR confirmed placement of device with no complications. Patient stable for discharge with out patient follow up.    Discharge Medications:    See medication reconciliation under discharge insructions.    Discharge Exam:  /79   Pulse 78   Temp 97.8 °F (36.6 °C) (Oral)   Resp 17   Ht 1.753 m (5' 9\")   Wt 78 kg (172 lb)   SpO2 99%   BMI 25.40 kg/m²   General appearance: alert, appears stated age, and cooperative  Lungs: clear to auscultation bilaterally  Heart: regular rate and rhythm  Abdomen: soft, non-tender; bowel sounds normal; no masses,  no organomegaly  Extremities: extremities normal, atraumatic, no cyanosis or

## 2024-07-19 ENCOUNTER — CARE COORDINATION (OUTPATIENT)
Dept: CASE MANAGEMENT | Age: 59
End: 2024-07-19

## 2024-07-19 DIAGNOSIS — Z95.810 BIVENTRICULAR ICD (IMPLANTABLE CARDIOVERTER-DEFIBRILLATOR) IN PLACE: Primary | ICD-10-CM

## 2024-07-19 PROCEDURE — 1111F DSCHRG MED/CURRENT MED MERGE: CPT | Performed by: STUDENT IN AN ORGANIZED HEALTH CARE EDUCATION/TRAINING PROGRAM

## 2024-07-19 NOTE — CARE COORDINATION
and are they filled?: Yes  Have you been contacted by a [facility] Pharmacist?: No  Have you scheduled your follow up appointment?: No  Do you have support at home?: Other Caregiver  Do you feel like you have everything you need to keep you well at home?: Yes  Are you an active caregiver in your home?: No  Care Transitions Interventions   Home Care Waiver: Declined        Transportation Support: Declined    Meals on Wheels: Declined  DME Assistance: Declined     Senior Services: Declined              Follow Up Appointment:   Patient does not have a follow up appointment scheduled at time of call. Declined to schedule follow up appointment.  Patient to call [facility] for appt.  Future Appointments         Provider Specialty Dept Phone    8/15/2024 11:15 AM (Arrive by 11:00 AM) Brittanie Escobar DO Family Medicine [phone]    8/16/2024 11:30 AM SCHEDULE, [facility] Infusion Therapy [phone]    8/23/2024 11:30 AM Analisa Plummer MD Oncology [phone]    8/23/2024 11:45 AM [facility], MED ONC NURSE Infusion Therapy [phone]    9/24/2024 11:30 AM Sonya Peralta, APRN - CNP Cardiology [phone]    10/3/2024 11:30 AM SCHEDULE, [facility]; SCHEDULE, [facility] TREATMENT Infusion Therapy [phone]    1/21/2025 4:00 PM [facility]PS AWV LPN Family [facility] [phone]            Care Transition Nurse provided contact information.  Plan for follow-up call in 2-5 days based on severity of symptoms and risk factors.  Plan for next call: confirm cardiology appt, dsg intact?, maintaining post-proc precautions, any needs?    Alyssia Hernández RN

## 2024-07-23 ENCOUNTER — CARE COORDINATION (OUTPATIENT)
Dept: CASE MANAGEMENT | Age: 59
End: 2024-07-23

## 2024-07-23 DIAGNOSIS — C61 MALIGNANT NEOPLASM OF PROSTATE (HCC): ICD-10-CM

## 2024-07-23 DIAGNOSIS — C61 PROSTATE CANCER METASTATIC TO BONE (HCC): ICD-10-CM

## 2024-07-23 DIAGNOSIS — C79.51 PROSTATE CANCER METASTATIC TO BONE (HCC): ICD-10-CM

## 2024-07-23 RX ORDER — ABIRATERONE ACETATE 250 MG/1
TABLET ORAL
Qty: 120 TABLET | Refills: 3 | Status: ACTIVE | OUTPATIENT
Start: 2024-07-23

## 2024-07-23 NOTE — CARE COORDINATION
Care Transitions Note    Follow Up Call     Dx: Planned BIVICD Upgrade     Patient Current Location:  Home: [address] [id number]    [facility] Care Coordinator contacted the patient by telephone. Verified name and  as identifiers.    Additional needs identified to be addressed with provider   No needs identified                 Method of communication with provider: none.    Care Summary Note: Spoke with Scot GREEN Kizzy who reported that he is doing good. Patient stated that pacemaker site looks good. Patient stated dressing C/D/I. Patient stated that no redness or swelling noted. Patient stated that he is following all post op instructions. Patient stated that he still needed to schedule pacer check. Writer did a 3 way call to the [facility] and patient got schedule for a site and pacer check for . Patient denied cp, sob, cough, dizziness, headache, n/v, diarrhea, abdominal pains, fever, or chills. Patient report that appetite and fluid intake is good and denied any problems with bowel or bladder. Patient reported that he is taking all medications as ordered. Patient denied any other needs at this time. Patient instructed to continue to monitor s/s, reporting any that may present to MD immediately for early intervention.  Patient is agreeable to f/u calls.     Plan of care updates since last contact:  Pacer and site check        Advance Care Planning   The patient has the following advanced directives on file:  Advance Directives       Power of  Living Will ACP-Advance Directive ACP-Power of     Not on File Not on File Not on File Not on File            The patient has appointed the following active healthcare agents:    Primary Decision Maker: Gina Hall - Brother/Sister - [phone]    Secondary Decision Maker: VIVIAN TRIPLETT - Child - [phone]      Medication Review:  No changes since last call.     Remote Patient Monitoring:  Offered patient enrollment in

## 2024-07-24 NOTE — PROGRESS NOTES
Patient Name: Scot Durand  Patient : 1965  Patient MRN: [id number]     Primary Oncologist: Analisa Plummer MD  Referring Provider: Brittanie Escobar DO     Date of Service: 2024      Chief Complaint:   Chief Complaint   Patient presents with    Follow-up     He came in for follow-up visit.     Patient Active Problem List:     Muscular dystrophy      Prostate cancer metastatic to bone      Anemia, unspecified     Malignant neoplasm of prostate     Family history of malignant neoplasm of breast     Encounter for nonprocreative genetic counseling     HPI:   Scot Durand is a pleasant 59-year-old  male patient was referred for this of the diagnosis of prostate cancer involving the bone, bladder and pelvic lymph node.  Initially he presented with gross hematuria in 2019. He went to see family doctor and had an ultrasound of the bladder.  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  Invasive prostatic adenocarcinoma considered less likely..    2019 PSA 29.51.  2019 CT AP showed 3.7 cm intraluminal bladder mass likely representing transitional cell carcinoma rather than extension of the prostate.  Chest x-ray showed no acute infiltrative process.  May 14, 2019 he underwent cystoscopy with transurethral resection of the bladder tumor, 2 cm in size and transrectal ultrasound-guided prostate needle biopsy.  Pathology report of left prostate biopsy showed adenocarcinoma [place] score 4+4.  Number cores positive out of 12.  Proportion of prostatic tissue involved by tumor was 17%. Perineural invasion was seen. Pathology report of urinary bladder biopsy showed prostatic adenocarcinoma.  2019 MRI of pelvis showed large mass left peripheral zone extraprostatic extension including left seminal vesicle involvement and left neurovascular bundle involvement,

## 2024-07-30 ENCOUNTER — CARE COORDINATION (OUTPATIENT)
Dept: CASE MANAGEMENT | Age: 59
End: 2024-07-30

## 2024-07-30 NOTE — CARE COORDINATION
Care Transitions Note    Follow Up Call     Patient Current Location:  Home: [address] [address]    [facility] Care Coordinator contacted the patient by telephone. Verified name and  as identifiers.    Additional needs identified to be addressed with provider   No needs identified                 Method of communication with provider: none.    Care Summary Note: LPN CC spoke with patient. States he is doing fine. States he has been keeping his arm down, as instructed. Denies CP, palpitations, N/V/D, fever/chills, drainage, heat, swelling. Pacemaker site CDI. Appetite good. Denies problems with bowels or bladder. Denies medication changes. Cardio f/u . Denies needs.     Thank you,   Yaritza Steele, HALEY Care Coordinator   [facility]  Remote Patient Monitoring, Care Transitions, Ambulatory Care Management  [phone]    Plan of care updates since last contact:  Review of patient management of conditions/medications:         Advance Care Planning:   Does patient have an Advance Directive: not on file; education provided - ACP packet via mail .    Medication Review:  No changes since last call.     Remote Patient Monitoring:  Offered patient enrollment in the Remote Patient Monitoring (RPM) program for in-home monitoring: Patient is not eligible for RPM program because: patient does not have qualifying diagnosis.    Assessments:  Care Transitions Subsequent and Final Call    Subsequent and Final Calls  Do you have any ongoing symptoms?: No  Have your medications changed?: No  Do you have any questions related to your medications?: No  Do you currently have any active services?: No  Do you have any needs or concerns that I can assist you with?: No  Identified Barriers: None  Care Transitions Interventions   Home Care Waiver: Declined        Transportation Support: Declined    Meals on Wheels: Declined  DME Assistance: Declined     Senior Services: Declined    Other Interventions:

## 2024-07-31 ENCOUNTER — NURSE ONLY (OUTPATIENT)
Dept: CARDIOLOGY CLINIC | Age: 59
End: 2024-07-31

## 2024-07-31 VITALS — TEMPERATURE: 97.8 F

## 2024-07-31 DIAGNOSIS — Z95.810 STATUS POST IMPLANTATION OF AUTOMATIC CARDIOVERTER/DEFIBRILLATOR (AICD): Primary | ICD-10-CM

## 2024-07-31 PROCEDURE — 99024 POSTOP FOLLOW-UP VISIT: CPT | Performed by: INTERNAL MEDICINE

## 2024-07-31 NOTE — PROGRESS NOTES
Patient seen for site check post ICD implant. Dressing removed. No signs of inflammation or infection noted.   Edges well approximated. Patient has no complaints of pain or discomfort. Single steri strip applied. Patient instructed to not lift arm higher than shoulder level. Instructions given on the use of [employer] monitor for home device checks.

## 2024-08-06 ENCOUNTER — CARE COORDINATION (OUTPATIENT)
Dept: CASE MANAGEMENT | Age: 59
End: 2024-08-06

## 2024-08-06 NOTE — CARE COORDINATION
Care Transitions Note    Follow Up Call     Patient Current Location:  Home: [address] [address]    [facility] Care Coordinator contacted the patient by telephone. Verified name and  as identifiers.    Additional needs identified to be addressed with provider   No needs identified                 Method of communication with provider: none.    Care Summary Note: LPN CC spoke with patient. States he is doing fine. Denies fever/chills, N/V/D, HA, dizziness, CP, SOB, palpitations. Pacemaker site CDI, healing well. States he has some hives r/t bandages/adhesives. LPN CC advised to try an OTC allergy medication & reach out to providers if sx worsen or do not improve. Patient verbalized understanding. Does not have a BP cuff. Appetite good. Denies problems with bowels or bladder. Denies medication changes.     Thank you,   Yaritza Steele, LPN Care Coordinator   [facility]  Remote Patient Monitoring, Care Transitions, Ambulatory Care Management  [phone]    Plan of care updates since last contact:  Review of patient management of conditions/medications:         Advance Care Planning:   Does patient have an Advance Directive: deferred at this time, will discuss on future follow up. .    Medication Review:  No changes since last call.     Remote Patient Monitoring:  Offered patient enrollment in the Remote Patient Monitoring (RPM) program for in-home monitoring: Patient is not eligible for RPM program because: patient does not have qualifying diagnosis.    Assessments:  Care Transitions Subsequent and Final Call    Subsequent and Final Calls  Do you have any ongoing symptoms?: No  Have your medications changed?: No  Do you have any questions related to your medications?: No  Do you currently have any active services?: No  Do you have any needs or concerns that I can assist you with?: No  Identified Barriers: None  Care Transitions Interventions   Home Care Waiver: Declined

## 2024-08-13 ENCOUNTER — CARE COORDINATION (OUTPATIENT)
Dept: CASE MANAGEMENT | Age: 59
End: 2024-08-13

## 2024-08-13 NOTE — CARE COORDINATION
Care Transitions Note    Final Call     Patient Current Location:  Home: [address] [address]    [facility] Care Coordinator contacted the patient by telephone. Verified name and  as identifiers.    Patient graduated from the Care Transitions program on 2024.  Patient/family has the ability to self manage at this time..      Advance Care Planning:   Does patient have an Advance Directive: not on file.    Handoff:   Patient/family agreeable to [facility] outreach.      Care Summary Note:   Spoke with patient.  He reports he is feeling fine. Denies  swelling, ha, sob, cough, cp, palpitations, nvd or fever. Good appetite and fluid intake. No urinary or bowel elimination problems. Pacer site is CDI. No questions, needs or concerns. Patient informed this is the final CTN call. Any medical concerns contact his PCP. Patient agreeable to [facility] f/u.     Assessments:  Care Transitions Subsequent and Final Call    Subsequent and Final Calls  Do you have any ongoing symptoms?: No  Have your medications changed?: No  Do you have any questions related to your medications?: No  Do you currently have any active services?: No  Do you have any needs or concerns that I can assist you with?: No  Identified Barriers: None  Care Transitions Interventions   Home Care Waiver: Declined        Transportation Support: Declined    Meals on Wheels: Declined  DME Assistance: Declined     Senior Services: Declined    Other Interventions:              Upcoming Appointments:    Future Appointments         Provider Specialty Dept Phone    2024 11:30 AM [facility] Infusion Therapy [phone]    2024 10:45 AM Hermelinda Hardy APRN - CNP Cardiology [phone]    2024 11:30 AM Analisa Plummer MD Oncology [phone]    2024 11:45 AM [facility] NURSE Infusion Therapy [phone]    2024 10:15 AM Brittanie Escobar DO Family Medicine [phone]    2024 11:30 AM Sonya Peralta, APRN

## 2024-08-15 ENCOUNTER — CARE COORDINATION (OUTPATIENT)
Dept: CARE COORDINATION | Age: 59
End: 2024-08-15

## 2024-08-16 ENCOUNTER — HOSPITAL ENCOUNTER (OUTPATIENT)
Dept: INFUSION THERAPY | Age: 59
Discharge: HOME | End: 2024-08-16
Payer: MEDICARE

## 2024-08-16 DIAGNOSIS — C61 MALIGNANT NEOPLASM OF PROSTATE (HCC): ICD-10-CM

## 2024-08-16 LAB
ALBUMIN SERPL-MCNC: 4.3 GM/DL (ref 3.4–5)
ALP BLD-CCNC: 73 IU/L (ref 40–128)
ALT SERPL-CCNC: 14 U/L (ref 10–40)
ANION GAP SERPL CALCULATED.3IONS-SCNC: 15 MMOL/L (ref 7–16)
AST SERPL-CCNC: 17 IU/L (ref 15–37)
BASOPHILS ABSOLUTE: 0 K/CU MM
BASOPHILS RELATIVE PERCENT: 0.7 % (ref 0–1)
BILIRUB SERPL-MCNC: 0.7 MG/DL (ref 0–1)
BUN SERPL-MCNC: 16 MG/DL (ref 6–23)
CALCIUM SERPL-MCNC: 9.3 MG/DL (ref 8.3–10.6)
CHLORIDE BLD-SCNC: 101 MMOL/L (ref 99–110)
CO2: 22 MMOL/L (ref 21–32)
CREAT SERPL-MCNC: 0.2 MG/DL (ref 0.9–1.3)
DIFFERENTIAL TYPE: ABNORMAL
EOSINOPHILS ABSOLUTE: 0.2 K/CU MM
EOSINOPHILS RELATIVE PERCENT: 3.1 % (ref 0–3)
GFR, ESTIMATED: >90 ML/MIN/1.73M2
GLUCOSE SERPL-MCNC: 87 MG/DL (ref 70–99)
HCT VFR BLD CALC: 40.6 % (ref 42–52)
HEMOGLOBIN: 13 GM/DL (ref 13.5–18)
LYMPHOCYTES ABSOLUTE: 1.5 K/CU MM
LYMPHOCYTES RELATIVE PERCENT: 24.1 % (ref 24–44)
MCH RBC QN AUTO: 30.2 PG (ref 27–31)
MCHC RBC AUTO-ENTMCNC: 32 % (ref 32–36)
MCV RBC AUTO: 94.4 FL (ref 78–100)
MONOCYTES ABSOLUTE: 0.4 K/CU MM
MONOCYTES RELATIVE PERCENT: 6.4 % (ref 0–4)
NEUTROPHILS ABSOLUTE: 4 K/CU MM
NEUTROPHILS RELATIVE PERCENT: 65.7 % (ref 36–66)
PDW BLD-RTO: 14.4 % (ref 11.7–14.9)
PLATELET # BLD: 127 K/CU MM (ref 140–440)
PMV BLD AUTO: 11.4 FL (ref 7.5–11.1)
POTASSIUM SERPL-SCNC: 4.2 MMOL/L (ref 3.5–5.1)
PSA, ULTRASENSITIVE: 0.01 NG/ML (ref 0–4)
RBC # BLD: 4.3 M/CU MM (ref 4.6–6.2)
SODIUM BLD-SCNC: 138 MMOL/L (ref 135–145)
TOTAL PROTEIN: 7.2 GM/DL (ref 6.4–8.2)
WBC # BLD: 6.1 K/CU MM (ref 4–10.5)

## 2024-08-16 PROCEDURE — 80053 COMPREHEN METABOLIC PANEL: CPT

## 2024-08-16 PROCEDURE — 85025 COMPLETE CBC W/AUTO DIFF WBC: CPT

## 2024-08-16 PROCEDURE — 84153 ASSAY OF PSA TOTAL: CPT

## 2024-08-16 PROCEDURE — 36415 COLL VENOUS BLD VENIPUNCTURE: CPT

## 2024-08-19 ENCOUNTER — CARE COORDINATION (OUTPATIENT)
Dept: CARE COORDINATION | Age: 59
End: 2024-08-19

## 2024-08-22 ENCOUNTER — TELEPHONE (OUTPATIENT)
Dept: CARDIOLOGY CLINIC | Age: 59
End: 2024-08-22

## 2024-08-22 ENCOUNTER — OFFICE VISIT (OUTPATIENT)
Dept: CARDIOLOGY CLINIC | Age: 59
End: 2024-08-22

## 2024-08-22 VITALS
DIASTOLIC BLOOD PRESSURE: 76 MMHG | HEIGHT: 69 IN | WEIGHT: 172 LBS | SYSTOLIC BLOOD PRESSURE: 124 MMHG | BODY MASS INDEX: 25.48 KG/M2 | HEART RATE: 68 BPM

## 2024-08-22 DIAGNOSIS — Z95.0 PACEMAKER: Primary | ICD-10-CM

## 2024-08-22 PROCEDURE — 99024 POSTOP FOLLOW-UP VISIT: CPT | Performed by: NURSE PRACTITIONER

## 2024-08-22 NOTE — PROGRESS NOTES
Patient is here today for 1 month follow-up status post implantation of BiV ICD  Left upper chest site well-approximated.  No redness or swelling no hematoma  Patient denies chest pain, palpitations, shortness of breath, lightheadedness, dizziness, edema or syncope  ICD interrogated  Device Assessment:     The device is Medtronic BIVICD .  Device interrogation was performed.     Mode : DDD     Sensing is normal. Impedence is normal.  Threshold is normal.     There has not been interval changes.      Estimated battery life is 9.4 years    Atrial Arrhythmia : No    Non sustained VT episodes : 1- NSVT    Sustained VT episodes : No      Patient activity reported by the device to be 0.7 hr/day     The underlying rhythm is AS,BIVP.  0.4 % atrial paced; 96.9 % ventricular paced.      The patient is pacemaker dependent.      HF management parameter are still collecting data  We will continue to monitor transmissions as scheduled  Patient follow-up with cardiology as scheduled

## 2024-08-23 ENCOUNTER — OFFICE VISIT (OUTPATIENT)
Dept: ONCOLOGY | Age: 59
End: 2024-08-23
Payer: MEDICARE

## 2024-08-23 ENCOUNTER — HOSPITAL ENCOUNTER (OUTPATIENT)
Dept: INFUSION THERAPY | Age: 59
Discharge: HOME | End: 2024-08-23
Payer: MEDICARE

## 2024-08-23 VITALS
DIASTOLIC BLOOD PRESSURE: 55 MMHG | OXYGEN SATURATION: 98 % | BODY MASS INDEX: 25.18 KG/M2 | TEMPERATURE: 96.7 F | HEIGHT: 69 IN | RESPIRATION RATE: 18 BRPM | HEART RATE: 66 BPM | WEIGHT: 170 LBS | SYSTOLIC BLOOD PRESSURE: 107 MMHG

## 2024-08-23 DIAGNOSIS — C61 MALIGNANT NEOPLASM OF PROSTATE (HCC): Primary | ICD-10-CM

## 2024-08-23 DIAGNOSIS — D69.6 THROMBOCYTOPENIA, UNSPECIFIED (HCC): ICD-10-CM

## 2024-08-23 PROCEDURE — 99211 OFF/OP EST MAY X REQ PHY/QHP: CPT

## 2024-08-23 PROCEDURE — 99213 OFFICE O/P EST LOW 20 MIN: CPT | Performed by: INTERNAL MEDICINE

## 2024-08-23 PROCEDURE — G8419 CALC BMI OUT NRM PARAM NOF/U: HCPCS | Performed by: INTERNAL MEDICINE

## 2024-08-23 PROCEDURE — 3017F COLORECTAL CA SCREEN DOC REV: CPT | Performed by: INTERNAL MEDICINE

## 2024-08-23 PROCEDURE — G8427 DOCREV CUR MEDS BY ELIG CLIN: HCPCS | Performed by: INTERNAL MEDICINE

## 2024-08-23 PROCEDURE — 1036F TOBACCO NON-USER: CPT | Performed by: INTERNAL MEDICINE

## 2024-08-23 NOTE — PROGRESS NOTES
MA Rooming Questions  Patient: Scot Durand  MRN: [id number]    Date: 8/23/2024        1. Do you have any new issues?   yes - pt had a pacemaker placed on 7/17 @ [facility]         2. Do you need any refills on medications?    no    3. Have you had any imaging done since your last visit?   no    4. Have you been hospitalized or seen in the emergency room since your last visit here?   yes - [facility] 7/17    5. Did the patient have a depression screening completed today? No    No data recorded     PHQ-9 Given to (if applicable):               PHQ-9 Score (if applicable):                     [] Positive     []  Negative              Does question #9 need addressed (if applicable)                     [] Yes    []  No               Caterina Sánchez MA

## 2024-09-19 ENCOUNTER — OFFICE VISIT (OUTPATIENT)
Dept: FAMILY MEDICINE CLINIC | Age: 59
End: 2024-09-19

## 2024-09-19 ENCOUNTER — HOSPITAL ENCOUNTER (OUTPATIENT)
Age: 59
Discharge: HOME | End: 2024-09-19
Payer: MEDICARE

## 2024-09-19 VITALS
OXYGEN SATURATION: 98 % | DIASTOLIC BLOOD PRESSURE: 62 MMHG | HEIGHT: 69 IN | BODY MASS INDEX: 25.09 KG/M2 | SYSTOLIC BLOOD PRESSURE: 110 MMHG | HEART RATE: 55 BPM

## 2024-09-19 DIAGNOSIS — C61 PROSTATE CANCER METASTATIC TO BONE (HCC): Chronic | ICD-10-CM

## 2024-09-19 DIAGNOSIS — Z23 NEED FOR PROPHYLACTIC VACCINATION AND INOCULATION AGAINST INFLUENZA: ICD-10-CM

## 2024-09-19 DIAGNOSIS — E78.2 MIXED HYPERLIPIDEMIA: ICD-10-CM

## 2024-09-19 DIAGNOSIS — C79.51 PROSTATE CANCER METASTATIC TO BONE (HCC): Chronic | ICD-10-CM

## 2024-09-19 DIAGNOSIS — G71.01 BECKER'S MUSCULAR DYSTROPHY (HCC): Primary | ICD-10-CM

## 2024-09-19 LAB
CHOLEST SERPL-MCNC: 211 MG/DL (ref 125–199)
HDLC SERPL-MCNC: 39 MG/DL
LDLC SERPL CALC-MCNC: 144 MG/DL
TRIGL SERPL-MCNC: 136 MG/DL

## 2024-09-19 PROCEDURE — 80061 LIPID PANEL: CPT

## 2024-09-19 PROCEDURE — 36415 COLL VENOUS BLD VENIPUNCTURE: CPT

## 2024-09-19 NOTE — PROGRESS NOTES
(HCC)      3. Need for prophylactic vaccination and inoculation against influenza    - Influenza, FLUCELVAX Trivalent, (age 6 mo+) IM, Preservative Free, 0.5mL    Continue current regimen  Weight stable  Continue follow [facility]      No follow-ups on file.         Electronically signed by Brittanie Escobar DO on 9/19/2024

## 2024-09-24 ENCOUNTER — OFFICE VISIT (OUTPATIENT)
Dept: CARDIOLOGY CLINIC | Age: 59
End: 2024-09-24
Payer: MEDICARE

## 2024-09-24 VITALS
SYSTOLIC BLOOD PRESSURE: 124 MMHG | OXYGEN SATURATION: 99 % | DIASTOLIC BLOOD PRESSURE: 70 MMHG | HEIGHT: 69 IN | RESPIRATION RATE: 16 BRPM | HEART RATE: 60 BPM | BODY MASS INDEX: 25.09 KG/M2

## 2024-09-24 DIAGNOSIS — Z95.0 BIVENTRICULAR CARDIAC PACEMAKER IN SITU: ICD-10-CM

## 2024-09-24 DIAGNOSIS — I50.22 CHRONIC SYSTOLIC (CONGESTIVE) HEART FAILURE (HCC): Primary | ICD-10-CM

## 2024-09-24 DIAGNOSIS — I42.9 CARDIOMYOPATHY, UNSPECIFIED TYPE (HCC): ICD-10-CM

## 2024-09-24 PROCEDURE — 1036F TOBACCO NON-USER: CPT | Performed by: NURSE PRACTITIONER

## 2024-09-24 PROCEDURE — 99214 OFFICE O/P EST MOD 30 MIN: CPT | Performed by: NURSE PRACTITIONER

## 2024-09-24 PROCEDURE — G8419 CALC BMI OUT NRM PARAM NOF/U: HCPCS | Performed by: NURSE PRACTITIONER

## 2024-09-24 PROCEDURE — G8427 DOCREV CUR MEDS BY ELIG CLIN: HCPCS | Performed by: NURSE PRACTITIONER

## 2024-09-24 PROCEDURE — 3017F COLORECTAL CA SCREEN DOC REV: CPT | Performed by: NURSE PRACTITIONER

## 2024-09-24 ASSESSMENT — ENCOUNTER SYMPTOMS
SHORTNESS OF BREATH: 0
COUGH: 0

## 2024-09-27 ENCOUNTER — PATIENT MESSAGE (OUTPATIENT)
Dept: CARDIOLOGY CLINIC | Age: 59
End: 2024-09-27

## 2024-09-30 ENCOUNTER — TELEPHONE (OUTPATIENT)
Dept: CARDIOLOGY CLINIC | Age: 59
End: 2024-09-30

## 2024-09-30 NOTE — TELEPHONE ENCOUNTER
Good Morning Don,  I hope this information answers your questions.    What is Aldactone?  Aldactone is a potassium-sparing diuretic (water pill) that prevents your body from absorbing too much salt and keeps your potassium levels from getting too low.  Aldactone is used to treat heart failure, high blood pressure (hypertension), or hypokalemia (low potassium levels in the blood).  Aldactone also treats fluid retention (edema) in people with congestive heart failure, cirrhosis of the liver, or a kidney disorder called nephrotic syndrome.  Aldactone is also used to diagnose or treat a condition in which you have too much aldosterone in your body. Aldosterone is a hormone produced by your adrenal glands to help regulate the salt and water balance in your body.    Regarding Cholestrol levels and Aldactone:   Spironolactone has been found to increase LDL (\"bad\") cholesterol and decrease HDL (\"good\") cholesterol levels at the relatively high doses used in women with polycystic ovary syndrome (PCOS). As such, it may have unfavorable effects on the blood lipid profile in this context.

## 2024-09-30 NOTE — TELEPHONE ENCOUNTER
Called Don and we discussed the spironolactone and other causes of increased cholesterol.     He has been eating a lot of fast food and is not able to do much activity due to MD. He states understanding of rational for medication and risk for himself at the dose he is on. He is going to change diet and follow up with cholesterol again and then evaluate statin need.

## 2024-10-03 ENCOUNTER — HOSPITAL ENCOUNTER (OUTPATIENT)
Dept: INFUSION THERAPY | Age: 59
Discharge: HOME | End: 2024-10-03
Payer: MEDICARE

## 2024-10-03 DIAGNOSIS — C61 MALIGNANT NEOPLASM OF PROSTATE (HCC): Primary | ICD-10-CM

## 2024-10-03 PROCEDURE — 6360000002 HC RX W HCPCS: Performed by: INTERNAL MEDICINE

## 2024-10-03 PROCEDURE — 96402 CHEMO HORMON ANTINEOPL SQ/IM: CPT

## 2024-10-03 RX ADMIN — LEUPROLIDE ACETATE 22.5 MG: 22.5 INJECTION, SUSPENSION, EXTENDED RELEASE SUBCUTANEOUS at 11:48

## 2024-10-03 NOTE — PROGRESS NOTES
Arrived to treatment suite for scheduled Eligard injection.  Treatment plan approved, released and completed as ordered.  Eligard injection given SC in RLQ.  Pt tolerated well.  Band aid applied at injection site.  AVS declined.  Discharge via wheelchair in stable condition.  Rocio Mcdermott RN

## 2024-10-04 ASSESSMENT — ENCOUNTER SYMPTOMS
NAUSEA: 0
ABDOMINAL PAIN: 0
SORE THROAT: 0
WHEEZING: 0
SHORTNESS OF BREATH: 0

## 2024-10-17 DIAGNOSIS — C79.51 PROSTATE CANCER METASTATIC TO BONE (HCC): ICD-10-CM

## 2024-10-17 DIAGNOSIS — C61 PROSTATE CANCER METASTATIC TO BONE (HCC): ICD-10-CM

## 2024-10-17 DIAGNOSIS — C61 MALIGNANT NEOPLASM OF PROSTATE (HCC): ICD-10-CM

## 2024-10-17 RX ORDER — ABIRATERONE ACETATE 250 MG/1
TABLET ORAL
Qty: 120 TABLET | Refills: 3 | Status: ACTIVE | OUTPATIENT
Start: 2024-10-17

## 2024-10-28 PROCEDURE — 93295 DEV INTERROG REMOTE 1/2/MLT: CPT | Performed by: INTERNAL MEDICINE

## 2024-10-28 PROCEDURE — 93296 REM INTERROG EVL PM/IDS: CPT | Performed by: INTERNAL MEDICINE

## 2024-11-10 NOTE — PROGRESS NOTES
Patient Name: Scot Durand  Patient : 1965  Patient MRN: [id number]     Primary Oncologist: Analisa Plummer MD  Referring Provider: Brittanie Escobar DO     Date of Service: 2024      Chief Complaint:   No chief complaint on file.    He came in for follow-up visit.     Patient Active Problem List:     Muscular dystrophy      Prostate cancer metastatic to bone      Anemia, unspecified     Malignant neoplasm of prostate     Family history of malignant neoplasm of breast     Encounter for nonprocreative genetic counseling     HPI:   Scot Durand is a pleasant 59-year-old  male patient was referred for this of the diagnosis of prostate cancer involving the bone, bladder and pelvic lymph node.  Initially he presented with gross hematuria in 2019. He went to see family doctor and had an ultrasound of the bladder.  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  Invasive prostatic adenocarcinoma considered less likely..    2019 PSA 29.51.  2019 CT AP showed 3.7 cm intraluminal bladder mass likely representing transitional cell carcinoma rather than extension of the prostate.  Chest x-ray showed no acute infiltrative process.  May 14, 2019 he underwent cystoscopy with transurethral resection of the bladder tumor, 2 cm in size and transrectal ultrasound-guided prostate needle biopsy.  Pathology report of left prostate biopsy showed adenocarcinoma Stacey score 4+4.  Number cores positive out of 12.  Proportion of prostatic tissue involved by tumor was 17%. Perineural invasion was seen. Pathology report of urinary bladder biopsy showed prostatic adenocarcinoma.  2019 MRI of pelvis showed large mass left peripheral zone extraprostatic extension including left seminal vesicle involvement and left neurovascular bundle involvement,  Multiple  enlarged lymph

## 2024-11-15 DIAGNOSIS — C79.51 PROSTATE CANCER METASTATIC TO BONE (HCC): ICD-10-CM

## 2024-11-15 DIAGNOSIS — C61 MALIGNANT NEOPLASM OF PROSTATE (HCC): ICD-10-CM

## 2024-11-15 DIAGNOSIS — C61 PROSTATE CANCER METASTATIC TO BONE (HCC): ICD-10-CM

## 2024-11-15 RX ORDER — ABIRATERONE ACETATE 250 MG/1
TABLET ORAL
Qty: 120 TABLET | Refills: 3 | OUTPATIENT
Start: 2024-11-15

## 2024-11-27 ENCOUNTER — TELEPHONE (OUTPATIENT)
Dept: CARDIOLOGY CLINIC | Age: 59
End: 2024-11-27

## 2024-11-27 NOTE — TELEPHONE ENCOUNTER
Notified         Echo (TTE) limited with contrast       Limited study to evaulate EF.    Left Ventricle: Reduced left ventricular systolic function with a visually estimated EF of 35 - 40%. Left ventricle size is normal. Normal wall thickness. Global hypokinesis and Apical akinesis.    Right Ventricle: ICD wire noted on the rightside of the heart.    Tricuspid Valve: Physiologically normal regurgitation.    Pericardium: No pericardial effusion.    Image quality is fair. Contrast used: Definity. Procedure performed with the patient in a sitting position.      EF of 25 - 30%. =7/17/2024

## 2024-12-02 ENCOUNTER — HOSPITAL ENCOUNTER (OUTPATIENT)
Dept: INFUSION THERAPY | Age: 59
Discharge: HOME | End: 2024-12-02
Payer: MEDICARE

## 2024-12-02 ENCOUNTER — TELEPHONE (OUTPATIENT)
Dept: INFUSION THERAPY | Age: 59
End: 2024-12-02

## 2024-12-02 DIAGNOSIS — C61 MALIGNANT NEOPLASM OF PROSTATE (HCC): ICD-10-CM

## 2024-12-02 LAB
ALBUMIN SERPL-MCNC: 4.5 G/DL (ref 3.4–5)
ALBUMIN/GLOB SERPL: 1.9 {RATIO} (ref 1.1–2.2)
ALP SERPL-CCNC: 68 U/L (ref 40–129)
ALT SERPL-CCNC: 22 U/L (ref 10–40)
ANION GAP SERPL CALCULATED.3IONS-SCNC: 13 MMOL/L (ref 9–17)
AST SERPL-CCNC: 23 U/L (ref 15–37)
BASOPHILS # BLD: 0.02 K/UL
BASOPHILS NFR BLD: 0 % (ref 0–1)
BILIRUB SERPL-MCNC: 0.6 MG/DL (ref 0–1)
BUN SERPL-MCNC: 17 MG/DL (ref 7–20)
CALCIUM SERPL-MCNC: 9.7 MG/DL (ref 8.3–10.6)
CHLORIDE SERPL-SCNC: 100 MMOL/L (ref 99–110)
CO2 SERPL-SCNC: 26 MMOL/L (ref 21–32)
CREAT SERPL-MCNC: 0.3 MG/DL (ref 0.8–1.3)
EOSINOPHIL # BLD: 0.13 K/UL
EOSINOPHILS RELATIVE PERCENT: 2 % (ref 0–3)
ERYTHROCYTE [DISTWIDTH] IN BLOOD BY AUTOMATED COUNT: 14.1 % (ref 11.7–14.9)
FERRITIN SERPL-MCNC: 332 NG/ML (ref 30–400)
GFR, ESTIMATED: >90 ML/MIN/1.73M2
GLUCOSE SERPL-MCNC: 92 MG/DL (ref 74–99)
HCT VFR BLD AUTO: 40.4 % (ref 42–52)
HGB BLD-MCNC: 13.2 G/DL (ref 13.5–18)
IRON SATN MFR SERPL: 19 % (ref 15–50)
IRON SERPL-MCNC: 63 UG/DL (ref 59–158)
LYMPHOCYTES NFR BLD: 1.3 K/UL
LYMPHOCYTES RELATIVE PERCENT: 21 % (ref 24–44)
MCH RBC QN AUTO: 30.3 PG (ref 27–31)
MCHC RBC AUTO-ENTMCNC: 32.7 G/DL (ref 32–36)
MCV RBC AUTO: 92.9 FL (ref 78–100)
MONOCYTES NFR BLD: 0.27 K/UL
MONOCYTES NFR BLD: 4 % (ref 0–4)
NEUTROPHILS NFR BLD: 72 % (ref 36–66)
NEUTS SEG NFR BLD: 4.45 K/UL
PLATELET # BLD AUTO: 162 K/UL (ref 140–440)
PMV BLD AUTO: 11.2 FL (ref 7.5–11.1)
POTASSIUM SERPL-SCNC: 4.1 MMOL/L (ref 3.5–5.1)
PROT SERPL-MCNC: 6.9 G/DL (ref 6.4–8.2)
RBC # BLD AUTO: 4.35 M/UL (ref 4.6–6.2)
SODIUM SERPL-SCNC: 139 MMOL/L (ref 136–145)
TIBC SERPL-MCNC: 335 UG/DL (ref 260–445)
UNSATURATED IRON BINDING CAPACITY: 272 UG/DL (ref 110–370)
WBC OTHER # BLD: 6.2 K/UL (ref 4–10.5)

## 2024-12-02 PROCEDURE — 83550 IRON BINDING TEST: CPT

## 2024-12-02 PROCEDURE — 80053 COMPREHEN METABOLIC PANEL: CPT

## 2024-12-02 PROCEDURE — 36415 COLL VENOUS BLD VENIPUNCTURE: CPT

## 2024-12-02 PROCEDURE — 83540 ASSAY OF IRON: CPT

## 2024-12-02 PROCEDURE — 85025 COMPLETE CBC W/AUTO DIFF WBC: CPT

## 2024-12-02 PROCEDURE — 82728 ASSAY OF FERRITIN: CPT

## 2024-12-02 PROCEDURE — 82525 ASSAY OF COPPER: CPT

## 2024-12-02 NOTE — TELEPHONE ENCOUNTER
PT WANTED TO R/S FROM 12/26 TO 12/13. ADV TX IS Q12 WEEKS, 12/13 WOULD BE TOO SOON, INSURANCE WOULD NOT COVER. PT STATED WILL WORK ON GETTING TRANSPORT FOR 12/26.

## 2024-12-03 LAB — COPPER SERPL-MCNC: 123.1 UG/DL (ref 70–140)

## 2024-12-06 ENCOUNTER — HOSPITAL ENCOUNTER (OUTPATIENT)
Dept: INFUSION THERAPY | Age: 59
Discharge: HOME | End: 2024-12-06
Payer: MEDICARE

## 2024-12-06 ENCOUNTER — OFFICE VISIT (OUTPATIENT)
Dept: ONCOLOGY | Age: 59
End: 2024-12-06
Payer: MEDICARE

## 2024-12-06 VITALS
SYSTOLIC BLOOD PRESSURE: 128 MMHG | HEIGHT: 69 IN | TEMPERATURE: 97.7 F | BODY MASS INDEX: 25.1 KG/M2 | RESPIRATION RATE: 16 BRPM | OXYGEN SATURATION: 98 % | HEART RATE: 71 BPM | DIASTOLIC BLOOD PRESSURE: 68 MMHG

## 2024-12-06 DIAGNOSIS — D64.9 ANEMIA, UNSPECIFIED TYPE: ICD-10-CM

## 2024-12-06 DIAGNOSIS — C61 MALIGNANT NEOPLASM OF PROSTATE (HCC): Primary | ICD-10-CM

## 2024-12-06 LAB — PSA SERPL-MCNC: <0.01 NG/ML (ref 0–4)

## 2024-12-06 PROCEDURE — G8427 DOCREV CUR MEDS BY ELIG CLIN: HCPCS | Performed by: INTERNAL MEDICINE

## 2024-12-06 PROCEDURE — G8419 CALC BMI OUT NRM PARAM NOF/U: HCPCS | Performed by: INTERNAL MEDICINE

## 2024-12-06 PROCEDURE — 99211 OFF/OP EST MAY X REQ PHY/QHP: CPT

## 2024-12-06 PROCEDURE — 1036F TOBACCO NON-USER: CPT | Performed by: INTERNAL MEDICINE

## 2024-12-06 PROCEDURE — 84153 ASSAY OF PSA TOTAL: CPT

## 2024-12-06 PROCEDURE — 36415 COLL VENOUS BLD VENIPUNCTURE: CPT

## 2024-12-06 PROCEDURE — 99213 OFFICE O/P EST LOW 20 MIN: CPT | Performed by: INTERNAL MEDICINE

## 2024-12-06 PROCEDURE — 3017F COLORECTAL CA SCREEN DOC REV: CPT | Performed by: INTERNAL MEDICINE

## 2024-12-06 PROCEDURE — G8484 FLU IMMUNIZE NO ADMIN: HCPCS | Performed by: INTERNAL MEDICINE

## 2024-12-06 ASSESSMENT — PATIENT HEALTH QUESTIONNAIRE - PHQ9
SUM OF ALL RESPONSES TO PHQ QUESTIONS 1-9: 0
SUM OF ALL RESPONSES TO PHQ9 QUESTIONS 1 & 2: 0
SUM OF ALL RESPONSES TO PHQ QUESTIONS 1-9: 0
2. FEELING DOWN, DEPRESSED OR HOPELESS: NOT AT ALL
SUM OF ALL RESPONSES TO PHQ QUESTIONS 1-9: 0
SUM OF ALL RESPONSES TO PHQ QUESTIONS 1-9: 0
1. LITTLE INTEREST OR PLEASURE IN DOING THINGS: NOT AT ALL

## 2024-12-06 NOTE — PROGRESS NOTES
MA Rooming Questions  Patient: Scot Durand  MRN: [id number]    Date: 12/6/2024        1. Do you have any new issues?   no         2. Do you need any refills on medications?    no    3. Have you had any imaging done since your last visit?   no    4. Have you been hospitalized or seen in the emergency room since your last visit here?   no    5. Did the patient have a depression screening completed today? Yes    No data recorded     PHQ-9 Given to (if applicable):               PHQ-9 Score (if applicable):                     [] Positive     []  Negative              Does question #9 need addressed (if applicable)                     [] Yes    []  No               Reyna Cross MA

## 2024-12-20 ENCOUNTER — TELEPHONE (OUTPATIENT)
Dept: INFUSION THERAPY | Age: 59
End: 2024-12-20

## 2024-12-20 DIAGNOSIS — I51.9 SYSTOLIC DYSFUNCTION: ICD-10-CM

## 2024-12-20 RX ORDER — SPIRONOLACTONE 25 MG/1
25 TABLET ORAL DAILY
Qty: 30 TABLET | Refills: 5 | Status: SHIPPED | OUTPATIENT
Start: 2024-12-20

## 2024-12-30 ENCOUNTER — HOSPITAL ENCOUNTER (OUTPATIENT)
Dept: INFUSION THERAPY | Age: 59
Discharge: HOME | End: 2024-12-30
Payer: MEDICARE

## 2024-12-30 DIAGNOSIS — C61 MALIGNANT NEOPLASM OF PROSTATE (HCC): Primary | ICD-10-CM

## 2024-12-30 PROCEDURE — 6360000002 HC RX W HCPCS: Performed by: INTERNAL MEDICINE

## 2024-12-30 PROCEDURE — 96402 CHEMO HORMON ANTINEOPL SQ/IM: CPT

## 2024-12-30 RX ADMIN — LEUPROLIDE ACETATE 22.5 MG: 22.5 INJECTION, SUSPENSION, EXTENDED RELEASE SUBCUTANEOUS at 12:02

## 2024-12-30 NOTE — PROGRESS NOTES
Patient wheeled to infusion area, here today for an Eligard injection. No concerns at this time. Treatment approved and given in [facility]. Patient tolerated well. Patient declined discharge instructions. RTC 03/27 for next injection.

## 2025-01-19 SDOH — ECONOMIC STABILITY: FOOD INSECURITY: WITHIN THE PAST 12 MONTHS, THE FOOD YOU BOUGHT JUST DIDN'T LAST AND YOU DIDN'T HAVE MONEY TO GET MORE.: NEVER TRUE

## 2025-01-19 SDOH — ECONOMIC STABILITY: FOOD INSECURITY: WITHIN THE PAST 12 MONTHS, YOU WORRIED THAT YOUR FOOD WOULD RUN OUT BEFORE YOU GOT MONEY TO BUY MORE.: NEVER TRUE

## 2025-01-19 SDOH — ECONOMIC STABILITY: INCOME INSECURITY: IN THE LAST 12 MONTHS, WAS THERE A TIME WHEN YOU WERE NOT ABLE TO PAY THE MORTGAGE OR RENT ON TIME?: NO

## 2025-01-19 SDOH — ECONOMIC STABILITY: TRANSPORTATION INSECURITY
IN THE PAST 12 MONTHS, HAS THE LACK OF TRANSPORTATION KEPT YOU FROM MEDICAL APPOINTMENTS OR FROM GETTING MEDICATIONS?: NO

## 2025-01-19 SDOH — HEALTH STABILITY: PHYSICAL HEALTH: ON AVERAGE, HOW MANY DAYS PER WEEK DO YOU ENGAGE IN MODERATE TO STRENUOUS EXERCISE (LIKE A BRISK WALK)?: 0 DAYS

## 2025-01-19 ASSESSMENT — PATIENT HEALTH QUESTIONNAIRE - PHQ9
SUM OF ALL RESPONSES TO PHQ QUESTIONS 1-9: 0
2. FEELING DOWN, DEPRESSED OR HOPELESS: NOT AT ALL
SUM OF ALL RESPONSES TO PHQ QUESTIONS 1-9: 0
SUM OF ALL RESPONSES TO PHQ QUESTIONS 1-9: 0
1. LITTLE INTEREST OR PLEASURE IN DOING THINGS: NOT AT ALL
SUM OF ALL RESPONSES TO PHQ QUESTIONS 1-9: 0
SUM OF ALL RESPONSES TO PHQ9 QUESTIONS 1 & 2: 0

## 2025-01-19 ASSESSMENT — LIFESTYLE VARIABLES
HOW OFTEN DO YOU HAVE A DRINK CONTAINING ALCOHOL: 1
HOW MANY STANDARD DRINKS CONTAINING ALCOHOL DO YOU HAVE ON A TYPICAL DAY: 0
HOW OFTEN DO YOU HAVE SIX OR MORE DRINKS ON ONE OCCASION: 1
HOW OFTEN DO YOU HAVE A DRINK CONTAINING ALCOHOL: NEVER
HOW MANY STANDARD DRINKS CONTAINING ALCOHOL DO YOU HAVE ON A TYPICAL DAY: PATIENT DOES NOT DRINK

## 2025-01-21 ENCOUNTER — TELEMEDICINE (OUTPATIENT)
Dept: FAMILY MEDICINE CLINIC | Age: 60
End: 2025-01-21

## 2025-01-21 DIAGNOSIS — Z00.00 MEDICARE ANNUAL WELLNESS VISIT, SUBSEQUENT: Primary | ICD-10-CM

## 2025-01-21 SDOH — ECONOMIC STABILITY: FOOD INSECURITY: WITHIN THE PAST 12 MONTHS, YOU WORRIED THAT YOUR FOOD WOULD RUN OUT BEFORE YOU GOT MONEY TO BUY MORE.: NEVER TRUE

## 2025-01-21 SDOH — ECONOMIC STABILITY: FOOD INSECURITY: WITHIN THE PAST 12 MONTHS, THE FOOD YOU BOUGHT JUST DIDN'T LAST AND YOU DIDN'T HAVE MONEY TO GET MORE.: NEVER TRUE

## 2025-01-21 ASSESSMENT — PATIENT HEALTH QUESTIONNAIRE - PHQ9
SUM OF ALL RESPONSES TO PHQ QUESTIONS 1-9: 0
SUM OF ALL RESPONSES TO PHQ QUESTIONS 1-9: 0
SUM OF ALL RESPONSES TO PHQ9 QUESTIONS 1 & 2: 0
SUM OF ALL RESPONSES TO PHQ QUESTIONS 1-9: 0
1. LITTLE INTEREST OR PLEASURE IN DOING THINGS: NOT AT ALL
2. FEELING DOWN, DEPRESSED OR HOPELESS: NOT AT ALL
SUM OF ALL RESPONSES TO PHQ QUESTIONS 1-9: 0

## 2025-01-21 ASSESSMENT — LIFESTYLE VARIABLES
HOW MANY STANDARD DRINKS CONTAINING ALCOHOL DO YOU HAVE ON A TYPICAL DAY: PATIENT DOES NOT DRINK
HOW OFTEN DO YOU HAVE A DRINK CONTAINING ALCOHOL: NEVER

## 2025-01-21 NOTE — PROGRESS NOTES
Medicare Annual Wellness Visit    Scot Durand is here for Medicare AWV    Assessment & Plan   Medicare annual wellness visit, subsequent     No follow-ups on file.     Subjective       Patient's complete Health Risk Assessment and screening values have been reviewed and are found in Flowsheets. The following problems were reviewed today and where indicated follow up appointments were made and/or referrals ordered.    Positive Risk Factor Screenings with Interventions:              Inactivity:  On average, how many days per week do you engage in moderate to strenuous exercise (like a brisk walk)?: 0 days (!) Abnormal  On average, how many minutes do you engage in exercise at this level?: 0 min  Interventions:  Patient comments: patient is wheelchair bound and is unable to exercise.  Patient declined any further interventions or treatment        Vision Screen:  Do you have difficulty driving, watching TV, or doing any of your daily activities because of your eyesight?: No  Have you had an eye exam within the past year?: (!) No (eyes have been stable for yrs)  Interventions:   Patient comments: patient states his eyes have been stable for years. He states he would make an eye appointment if he would notice any vision changes.  Patient declines any further evaluation or treatment     ADL's:   Patient reports needing help with:  Select all that apply: (!) Laundry, Housekeeping, Banking/Finances, Shopping, Food Preparation, Transportation (sister; sons drive)  Interventions:  Patient comments: states his sister does most of his ADLs for him. His sons drive him.  Patient declined any further interventions or treatment    Advanced Directives:  Do you have a Living Will?: (!) No    Intervention:  has NO advanced directive - not interested in additional information                     Objective    Patient-Reported Vitals  No data recorded     Unable to obtain 3 vital signs due to patient not having equipment to take

## 2025-01-21 NOTE — PATIENT INSTRUCTIONS
Personalized Preventive Plan for Scot Durand - 1/21/2025  Medicare offers a range of preventive health benefits. Some of the tests and screenings are paid in full while other may be subject to a deductible, co-insurance, and/or copay.  Some of these benefits include a comprehensive review of your medical history including lifestyle, illnesses that may run in your family, and various assessments and screenings as appropriate.  After reviewing your medical record and screening and assessments performed today your provider may have ordered immunizations, labs, imaging, and/or referrals for you.  A list of these orders (if applicable) as well as your Preventive Care list are included within your After Visit Summary for your review.

## 2025-01-23 DIAGNOSIS — I51.9 SYSTOLIC DYSFUNCTION: ICD-10-CM

## 2025-01-23 RX ORDER — METOPROLOL SUCCINATE 50 MG/1
50 TABLET, EXTENDED RELEASE ORAL DAILY
Qty: 90 TABLET | Refills: 3 | Status: SHIPPED | OUTPATIENT
Start: 2025-01-23

## 2025-01-29 PROCEDURE — 93295 DEV INTERROG REMOTE 1/2/MLT: CPT | Performed by: INTERNAL MEDICINE

## 2025-01-29 PROCEDURE — 93296 REM INTERROG EVL PM/IDS: CPT | Performed by: INTERNAL MEDICINE

## 2025-03-05 DIAGNOSIS — C79.51 PROSTATE CANCER METASTATIC TO BONE (HCC): ICD-10-CM

## 2025-03-05 DIAGNOSIS — C61 PROSTATE CANCER METASTATIC TO BONE (HCC): ICD-10-CM

## 2025-03-05 DIAGNOSIS — C61 MALIGNANT NEOPLASM OF PROSTATE (HCC): ICD-10-CM

## 2025-03-05 RX ORDER — ABIRATERONE ACETATE 250 MG/1
TABLET ORAL
Qty: 120 TABLET | Refills: 3 | Status: ACTIVE | OUTPATIENT
Start: 2025-03-05

## 2025-03-13 RX ORDER — LISINOPRIL 30 MG/1
30 TABLET ORAL DAILY
Qty: 90 TABLET | Refills: 3 | Status: SHIPPED | OUTPATIENT
Start: 2025-03-13

## 2025-03-17 ENCOUNTER — OFFICE VISIT (OUTPATIENT)
Dept: CARDIOLOGY CLINIC | Age: 60
End: 2025-03-17
Payer: MEDICARE

## 2025-03-17 VITALS
HEART RATE: 75 BPM | BODY MASS INDEX: 25.1 KG/M2 | SYSTOLIC BLOOD PRESSURE: 128 MMHG | HEIGHT: 69 IN | DIASTOLIC BLOOD PRESSURE: 80 MMHG

## 2025-03-17 DIAGNOSIS — I44.2 COMPLETE HEART BLOCK (HCC): ICD-10-CM

## 2025-03-17 DIAGNOSIS — I49.5 SICK SINUS SYNDROME (HCC): ICD-10-CM

## 2025-03-17 DIAGNOSIS — I44.2 ATRIOVENTRICULAR BLOCK, COMPLETE (HCC): ICD-10-CM

## 2025-03-17 DIAGNOSIS — E11.9 TYPE 2 DIABETES MELLITUS WITHOUT COMPLICATION, WITHOUT LONG-TERM CURRENT USE OF INSULIN: ICD-10-CM

## 2025-03-17 DIAGNOSIS — I45.9 HEART BLOCK: ICD-10-CM

## 2025-03-17 DIAGNOSIS — Z95.810 BIVENTRICULAR ICD (IMPLANTABLE CARDIOVERTER-DEFIBRILLATOR) IN PLACE: Primary | ICD-10-CM

## 2025-03-17 DIAGNOSIS — E78.5 DYSLIPIDEMIA: ICD-10-CM

## 2025-03-17 DIAGNOSIS — R55 SYNCOPE AND COLLAPSE: ICD-10-CM

## 2025-03-17 DIAGNOSIS — G71.01 BECKER'S MUSCULAR DYSTROPHY (HCC): ICD-10-CM

## 2025-03-17 DIAGNOSIS — I50.22 CHRONIC SYSTOLIC (CONGESTIVE) HEART FAILURE: ICD-10-CM

## 2025-03-17 PROCEDURE — 99214 OFFICE O/P EST MOD 30 MIN: CPT | Performed by: INTERNAL MEDICINE

## 2025-03-17 PROCEDURE — 3046F HEMOGLOBIN A1C LEVEL >9.0%: CPT | Performed by: INTERNAL MEDICINE

## 2025-03-17 PROCEDURE — 1036F TOBACCO NON-USER: CPT | Performed by: INTERNAL MEDICINE

## 2025-03-17 PROCEDURE — G8419 CALC BMI OUT NRM PARAM NOF/U: HCPCS | Performed by: INTERNAL MEDICINE

## 2025-03-17 PROCEDURE — 3017F COLORECTAL CA SCREEN DOC REV: CPT | Performed by: INTERNAL MEDICINE

## 2025-03-17 PROCEDURE — 2022F DILAT RTA XM EVC RTNOPTHY: CPT | Performed by: INTERNAL MEDICINE

## 2025-03-17 PROCEDURE — G8428 CUR MEDS NOT DOCUMENT: HCPCS | Performed by: INTERNAL MEDICINE

## 2025-03-17 NOTE — PATIENT INSTRUCTIONS
Thank you for allowing us to care for you today!   We want to ensure we can follow your treatment plan and we strive to give you the best outcomes and experience possible.   If you ever have a life threatening emergency and call 911 - for an ambulance (EMS)  REMEMBER  Our providers can only care for you at:   [facility] or [facility]   Even if you have someone take you or you drive yourself we can only care for you in a [facility] facility. Our providers are not setup at the other healthcare locations!    PLEASE CALL OUR OFFICE DURING NORMAL BUSINESS HOURS  Monday through Friday 8 am to 5 pm  AFTER HOURS the physician on-call cannot help with scheduling, rescheduling, procedure instruction questions or any type of medication need or issue.  [facility] P:[phone] - [facility] P:[phone] - [facility] P:[phone]      If you receive a survey:  We would appreciate you taking the time to share your experience concerning your provider visit in the office.    These surveys are confidential!  We are eager to improve and are counting on you to share your feedback so we can ensure you get the best care possible.

## 2025-03-17 NOTE — PROGRESS NOTES
CLINICAL STAFF DOCUMENTATION    Dr. Elan Durand  1965  [id number]    Have you had any Chest Pain recently? - No          Have you had any Shortness of Breath - No      Have you had any dizziness - No      Have you had any palpitations recently? - No      Do you have any edema - swelling in No      When did you have your last labs drawn 12/2024  What doctor ordered Dr. Ly   Do we have the labs in their chart Yes  If we do not have the labs, ask where they were drawn     If we do not have these labs, you are retrieve these labs for the provider!    Do you have a surgery or procedure scheduled in the near future - No      Do use tobacco products? - No  Do you drink alcohol? - No  Do you use any illicit drugs? - No  Caffeine? - No        Check medication list thoroughly!!! AND RECONCILE OUTSIDE MEDICATIONS  If dose has changed change the entire order not just the MG  BE SURE TO ASK PATIENT IF THEY NEED MEDICATION REFILLS  Verify Pharmacy and update if incorrect    Add to every patient's \"wrap up\" the following dot phrase AFTERVISITCARDIOHEARTHOUSE and ensure we explain this to our patients   
dystrophy   High  mg   Try zetia he will think about it , check lipids again      Counseled extensively and medication compliance urged.  We discussed that for the  prevention of ASCVD our  goal is aggressive risk modification.Patient is encouraged to exercise if they can , educated about  brisk walk for 30 minutes  at least 3 to 4 times a week if there are no physical limitations  Various goals were discussed and questions answered. Continue current medications. Appropriate prescriptions are addressed and refills ordered.  Questions answered and patient verbalizes understanding.  Call for any problems, questions, or concerns.Greater than 60 % of time spent counseling besides reviewing data and images     Continue all other medications of all above medical condition listed as is.    Return in about 6 months (around 9/17/2025).    Please note this report has been partially produced using speech recognition software and may contain errors related to that system including errors in grammar, punctuation, and spelling, as well as words and phrases that may be inappropriate. If there are any questions or concerns please feel free to contact the dictating provider for clarification.

## 2025-03-18 ENCOUNTER — HOSPITAL ENCOUNTER (OUTPATIENT)
Age: 60
Discharge: HOME | End: 2025-03-18
Payer: MEDICARE

## 2025-03-18 DIAGNOSIS — I50.22 CHRONIC SYSTOLIC (CONGESTIVE) HEART FAILURE: ICD-10-CM

## 2025-03-18 DIAGNOSIS — Z95.810 BIVENTRICULAR ICD (IMPLANTABLE CARDIOVERTER-DEFIBRILLATOR) IN PLACE: ICD-10-CM

## 2025-03-18 DIAGNOSIS — G71.01 BECKER'S MUSCULAR DYSTROPHY (HCC): ICD-10-CM

## 2025-03-18 DIAGNOSIS — E78.5 DYSLIPIDEMIA: ICD-10-CM

## 2025-03-18 DIAGNOSIS — I45.9 HEART BLOCK: ICD-10-CM

## 2025-03-18 DIAGNOSIS — I49.5 SICK SINUS SYNDROME (HCC): ICD-10-CM

## 2025-03-18 DIAGNOSIS — I44.2 COMPLETE HEART BLOCK (HCC): ICD-10-CM

## 2025-03-18 DIAGNOSIS — I44.2 ATRIOVENTRICULAR BLOCK, COMPLETE (HCC): ICD-10-CM

## 2025-03-18 DIAGNOSIS — E11.9 TYPE 2 DIABETES MELLITUS WITHOUT COMPLICATION, WITHOUT LONG-TERM CURRENT USE OF INSULIN: ICD-10-CM

## 2025-03-18 DIAGNOSIS — R55 SYNCOPE AND COLLAPSE: ICD-10-CM

## 2025-03-18 LAB
ALBUMIN SERPL-MCNC: 4.2 G/DL (ref 3.4–5)
ALBUMIN/GLOB SERPL: 1.6 {RATIO} (ref 1.1–2.2)
ALP SERPL-CCNC: 67 U/L (ref 40–129)
ALT SERPL-CCNC: 23 U/L (ref 10–40)
ANION GAP SERPL CALCULATED.3IONS-SCNC: 12 MMOL/L (ref 9–17)
AST SERPL-CCNC: 26 U/L (ref 15–37)
BILIRUB SERPL-MCNC: 0.6 MG/DL (ref 0–1)
BUN SERPL-MCNC: 12 MG/DL (ref 7–20)
CALCIUM SERPL-MCNC: 9.3 MG/DL (ref 8.3–10.6)
CHLORIDE SERPL-SCNC: 104 MMOL/L (ref 99–110)
CHOLEST SERPL-MCNC: 190 MG/DL (ref 125–199)
CO2 SERPL-SCNC: 23 MMOL/L (ref 21–32)
CREAT SERPL-MCNC: 0.3 MG/DL (ref 0.8–1.3)
ERYTHROCYTE [DISTWIDTH] IN BLOOD BY AUTOMATED COUNT: 13.6 % (ref 11.7–14.9)
GFR, ESTIMATED: >90 ML/MIN/1.73M2
GLUCOSE SERPL-MCNC: 96 MG/DL (ref 74–99)
HCT VFR BLD AUTO: 38.9 % (ref 42–52)
HDLC SERPL-MCNC: 34 MG/DL
HGB BLD-MCNC: 12.6 G/DL (ref 13.5–18)
LDLC SERPL CALC-MCNC: 131 MG/DL
MCH RBC QN AUTO: 30.2 PG (ref 27–31)
MCHC RBC AUTO-ENTMCNC: 32.4 G/DL (ref 32–36)
MCV RBC AUTO: 93.3 FL (ref 78–100)
PLATELET # BLD AUTO: 192 K/UL (ref 140–440)
PMV BLD AUTO: 11.4 FL (ref 7.5–11.1)
POTASSIUM SERPL-SCNC: 3.7 MMOL/L (ref 3.5–5.1)
PROT SERPL-MCNC: 6.9 G/DL (ref 6.4–8.2)
RBC # BLD AUTO: 4.17 M/UL (ref 4.6–6.2)
SODIUM SERPL-SCNC: 140 MMOL/L (ref 136–145)
TRIGL SERPL-MCNC: 121 MG/DL
WBC OTHER # BLD: 6.5 K/UL (ref 4–10.5)

## 2025-03-18 PROCEDURE — 80061 LIPID PANEL: CPT

## 2025-03-18 PROCEDURE — 80053 COMPREHEN METABOLIC PANEL: CPT

## 2025-03-18 PROCEDURE — 85027 COMPLETE CBC AUTOMATED: CPT

## 2025-03-27 ENCOUNTER — HOSPITAL ENCOUNTER (OUTPATIENT)
Dept: INFUSION THERAPY | Age: 60
Discharge: HOME | End: 2025-03-27
Payer: MEDICARE

## 2025-03-27 DIAGNOSIS — C61 MALIGNANT NEOPLASM OF PROSTATE (HCC): Primary | ICD-10-CM

## 2025-03-27 DIAGNOSIS — D64.9 ANEMIA, UNSPECIFIED TYPE: ICD-10-CM

## 2025-03-27 LAB
ALBUMIN SERPL-MCNC: 4.4 G/DL (ref 3.4–5)
ALBUMIN/GLOB SERPL: 1.6 {RATIO} (ref 1.1–2.2)
ALP SERPL-CCNC: 87 U/L (ref 40–129)
ALT SERPL-CCNC: 23 U/L (ref 10–40)
ANION GAP SERPL CALCULATED.3IONS-SCNC: 13 MMOL/L (ref 9–17)
AST SERPL-CCNC: 26 U/L (ref 15–37)
BASOPHILS # BLD: 0.02 K/UL
BASOPHILS NFR BLD: 0 % (ref 0–1)
BILIRUB SERPL-MCNC: 0.6 MG/DL (ref 0–1)
BUN SERPL-MCNC: 14 MG/DL (ref 7–20)
CALCIUM SERPL-MCNC: 9.7 MG/DL (ref 8.3–10.6)
CHLORIDE SERPL-SCNC: 103 MMOL/L (ref 99–110)
CO2 SERPL-SCNC: 23 MMOL/L (ref 21–32)
CREAT SERPL-MCNC: 0.3 MG/DL (ref 0.8–1.3)
EOSINOPHIL # BLD: 0.14 K/UL
EOSINOPHILS RELATIVE PERCENT: 2 % (ref 0–3)
ERYTHROCYTE [DISTWIDTH] IN BLOOD BY AUTOMATED COUNT: 14.3 % (ref 11.7–14.9)
FERRITIN SERPL-MCNC: 347 NG/ML (ref 30–400)
FOLATE SERPL-MCNC: 16.7 NG/ML (ref 4.8–24.2)
GFR, ESTIMATED: >90 ML/MIN/1.73M2
GLUCOSE SERPL-MCNC: 95 MG/DL (ref 74–99)
HCT VFR BLD AUTO: 40.9 % (ref 42–52)
HGB BLD-MCNC: 13.2 G/DL (ref 13.5–18)
IRON SATN MFR SERPL: 17 % (ref 15–50)
IRON SERPL-MCNC: 56 UG/DL (ref 59–158)
LYMPHOCYTES NFR BLD: 1.32 K/UL
LYMPHOCYTES RELATIVE PERCENT: 21 % (ref 24–44)
MCH RBC QN AUTO: 29.8 PG (ref 27–31)
MCHC RBC AUTO-ENTMCNC: 32.3 G/DL (ref 32–36)
MCV RBC AUTO: 92.3 FL (ref 78–100)
MONOCYTES NFR BLD: 0.32 K/UL
MONOCYTES NFR BLD: 5 % (ref 0–4)
NEUTROPHILS NFR BLD: 72 % (ref 36–66)
NEUTS SEG NFR BLD: 4.54 K/UL
PLATELET # BLD AUTO: 180 K/UL (ref 140–440)
PMV BLD AUTO: 10.8 FL (ref 7.5–11.1)
POTASSIUM SERPL-SCNC: 4.4 MMOL/L (ref 3.5–5.1)
PROT SERPL-MCNC: 7.2 G/DL (ref 6.4–8.2)
PSA SERPL-MCNC: <0.01 NG/ML (ref 0–4)
RBC # BLD AUTO: 4.43 M/UL (ref 4.6–6.2)
SODIUM SERPL-SCNC: 139 MMOL/L (ref 136–145)
TIBC SERPL-MCNC: 323 UG/DL (ref 260–445)
UNSATURATED IRON BINDING CAPACITY: 267 UG/DL (ref 110–370)
VIT B12 SERPL-MCNC: 443 PG/ML (ref 211–911)
WBC OTHER # BLD: 6.3 K/UL (ref 4–10.5)

## 2025-03-27 PROCEDURE — 82728 ASSAY OF FERRITIN: CPT

## 2025-03-27 PROCEDURE — 83540 ASSAY OF IRON: CPT

## 2025-03-27 PROCEDURE — 36415 COLL VENOUS BLD VENIPUNCTURE: CPT

## 2025-03-27 PROCEDURE — 84153 ASSAY OF PSA TOTAL: CPT

## 2025-03-27 PROCEDURE — 80053 COMPREHEN METABOLIC PANEL: CPT

## 2025-03-27 PROCEDURE — 82607 VITAMIN B-12: CPT

## 2025-03-27 PROCEDURE — 96402 CHEMO HORMON ANTINEOPL SQ/IM: CPT

## 2025-03-27 PROCEDURE — 85025 COMPLETE CBC W/AUTO DIFF WBC: CPT

## 2025-03-27 PROCEDURE — 83550 IRON BINDING TEST: CPT

## 2025-03-27 PROCEDURE — 82746 ASSAY OF FOLIC ACID SERUM: CPT

## 2025-03-27 PROCEDURE — 6360000002 HC RX W HCPCS: Performed by: INTERNAL MEDICINE

## 2025-03-27 RX ADMIN — LEUPROLIDE ACETATE 22.5 MG: 22.5 INJECTION, SUSPENSION, EXTENDED RELEASE SUBCUTANEOUS at 11:41

## 2025-03-27 NOTE — PROGRESS NOTES
Patient wheeled to infusion area, here today for a Eligard injection. No concerns at this time. Treatment approved and given in [facility]. Patient tolerated well. Patient declined discharge instructions.

## 2025-04-02 ENCOUNTER — TELEPHONE (OUTPATIENT)
Dept: CARDIOLOGY CLINIC | Age: 60
End: 2025-04-02

## 2025-04-02 NOTE — TELEPHONE ENCOUNTER
Left message on voicemail for patient to return my call to discuss renewal of Jardiance weston. Patients weston with the [facility] will  on 5/3/25.

## 2025-04-02 NOTE — TELEPHONE ENCOUNTER
Patient advised that he is no longer using the weston as he is using [employer]. Patient has no further needs at this time.

## 2025-04-10 ENCOUNTER — HOSPITAL ENCOUNTER (OUTPATIENT)
Dept: INFUSION THERAPY | Age: 60
Discharge: HOME | End: 2025-04-10
Payer: MEDICARE

## 2025-04-10 ENCOUNTER — OFFICE VISIT (OUTPATIENT)
Dept: ONCOLOGY | Age: 60
End: 2025-04-10
Payer: MEDICARE

## 2025-04-10 VITALS
HEART RATE: 70 BPM | DIASTOLIC BLOOD PRESSURE: 69 MMHG | TEMPERATURE: 97.1 F | SYSTOLIC BLOOD PRESSURE: 136 MMHG | OXYGEN SATURATION: 100 %

## 2025-04-10 DIAGNOSIS — C61 PROSTATE CANCER METASTATIC TO BONE (HCC): ICD-10-CM

## 2025-04-10 DIAGNOSIS — C79.51 PROSTATE CANCER METASTATIC TO BONE (HCC): ICD-10-CM

## 2025-04-10 DIAGNOSIS — C61 MALIGNANT NEOPLASM OF PROSTATE (HCC): Primary | ICD-10-CM

## 2025-04-10 PROCEDURE — 3017F COLORECTAL CA SCREEN DOC REV: CPT | Performed by: INTERNAL MEDICINE

## 2025-04-10 PROCEDURE — G8427 DOCREV CUR MEDS BY ELIG CLIN: HCPCS | Performed by: INTERNAL MEDICINE

## 2025-04-10 PROCEDURE — 1036F TOBACCO NON-USER: CPT | Performed by: INTERNAL MEDICINE

## 2025-04-10 PROCEDURE — 99212 OFFICE O/P EST SF 10 MIN: CPT

## 2025-04-10 PROCEDURE — 99214 OFFICE O/P EST MOD 30 MIN: CPT | Performed by: INTERNAL MEDICINE

## 2025-04-10 PROCEDURE — G8419 CALC BMI OUT NRM PARAM NOF/U: HCPCS | Performed by: INTERNAL MEDICINE

## 2025-04-10 RX ORDER — FERROUS SULFATE 325(65) MG
325 TABLET ORAL EVERY OTHER DAY
COMMUNITY

## 2025-04-10 RX ORDER — GINSENG 100 MG
50 CAPSULE ORAL EVERY OTHER DAY
COMMUNITY

## 2025-04-10 NOTE — PROGRESS NOTES
MA Rooming Questions  Patient: Scot Durand  MRN: [id number]    Date: 4/10/2025        1. Do you have any new issues?   no         2. Do you need any refills on medications?    no    3. Have you had any imaging done since your last visit?   no    4. Have you been hospitalized or seen in the emergency room since your last visit here?   no    5. Did the patient have a depression screening completed today? No    No data recorded     PHQ-9 Given to (if applicable):               PHQ-9 Score (if applicable):                     [] Positive     []  Negative              Does question #9 need addressed (if applicable)                     [] Yes    []  No               Kamryn Amador MA      
about healthy lifestyle and diet. He had Covid vaccine.    Time to complete visit: 30 minutes  This time includes: preparing to see the patient (review of tests/history), obtaining and/or reviewing separately obtained history, performing a medically appropriate evaluation, counseling and educating the patient and documenting clinical information in the electronic health record. This time also includes multiple disciplinary evaluation and management of cancer as documented.       Return to clinic in 3 months or sooner.  All of his question has been answered for today.    Recent imaging and labs were reviewed and discussed with the patient.

## 2025-04-30 PROCEDURE — 93296 REM INTERROG EVL PM/IDS: CPT | Performed by: INTERNAL MEDICINE

## 2025-04-30 PROCEDURE — 93295 DEV INTERROG REMOTE 1/2/MLT: CPT | Performed by: INTERNAL MEDICINE

## 2025-05-05 DIAGNOSIS — I51.9 SYSTOLIC DYSFUNCTION: ICD-10-CM

## 2025-05-21 DIAGNOSIS — C79.51 PROSTATE CANCER METASTATIC TO BONE (HCC): Chronic | ICD-10-CM

## 2025-05-21 DIAGNOSIS — C61 PROSTATE CANCER METASTATIC TO BONE (HCC): Chronic | ICD-10-CM

## 2025-05-21 RX ORDER — PREDNISONE 5 MG/1
5 TABLET ORAL DAILY
Qty: 30 TABLET | Refills: 4 | Status: SHIPPED | OUTPATIENT
Start: 2025-05-21

## 2025-06-16 DIAGNOSIS — I51.9 SYSTOLIC DYSFUNCTION: ICD-10-CM

## 2025-06-16 RX ORDER — SPIRONOLACTONE 25 MG/1
25 TABLET ORAL DAILY
Qty: 30 TABLET | Refills: 5 | Status: SHIPPED | OUTPATIENT
Start: 2025-06-16

## 2025-06-20 ENCOUNTER — HOSPITAL ENCOUNTER (OUTPATIENT)
Dept: INFUSION THERAPY | Age: 60
Discharge: HOME | End: 2025-06-20
Payer: MEDICARE

## 2025-06-20 DIAGNOSIS — C61 MALIGNANT NEOPLASM OF PROSTATE (HCC): Primary | ICD-10-CM

## 2025-06-20 PROCEDURE — 96402 CHEMO HORMON ANTINEOPL SQ/IM: CPT

## 2025-06-20 PROCEDURE — 6360000002 HC RX W HCPCS: Performed by: INTERNAL MEDICINE

## 2025-06-20 RX ADMIN — LEUPROLIDE ACETATE 22.5 MG: 22.5 INJECTION, SUSPENSION, EXTENDED RELEASE SUBCUTANEOUS at 14:02

## 2025-06-20 NOTE — PROGRESS NOTES
Patient arrived stable via wheelchair for Eligard injection. Patient denied any questions or concerns at this time. Eligard injection administered in RLQ of abdomen per patient preference. Bandaid applied over site. Pt declined printed AVS. Patient Dc'd stable via wheelchair.

## 2025-06-22 NOTE — PROGRESS NOTES
Patient Name: Scot Durand  Patient : 1965  Patient MRN: [id number]     Primary Oncologist: Analisa Plummer MD  Referring Provider: Brittanie Escobar DO     Date of Service: 2025      Chief Complaint:   No chief complaint on file.    He came in for follow-up visit.     Patient Active Problem List:     Muscular dystrophy      Prostate cancer metastatic to bone      Anemia, unspecified     Malignant neoplasm of prostate     Family history of malignant neoplasm of breast     Encounter for nonprocreative genetic counseling     HPI:   Scot Durand is a pleasant 59-year-old  male patient was referred for this of the diagnosis of prostate cancer involving the bone, bladder and pelvic lymph node.  Initially he presented with gross hematuria in 2019. He went to see family doctor and had an ultrasound of the bladder.  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  Invasive prostatic adenocarcinoma considered less likely..    2019 PSA 29.51.  2019 CT AP showed 3.7 cm intraluminal bladder mass likely representing transitional cell carcinoma rather than extension of the prostate.  Chest x-ray showed no acute infiltrative process.  May 14, 2019 he underwent cystoscopy with transurethral resection of the bladder tumor, 2 cm in size and transrectal ultrasound-guided prostate needle biopsy.  Pathology report of left prostate biopsy showed adenocarcinoma Cecilia score 4+4.  Number cores positive out of 12.  Proportion of prostatic tissue involved by tumor was 17%. Perineural invasion was seen. Pathology report of urinary bladder biopsy showed prostatic adenocarcinoma.  2019 MRI of pelvis showed large mass left peripheral zone extraprostatic extension including left seminal vesicle involvement and left neurovascular bundle involvement,  Multiple  enlarged lymph

## 2025-07-05 DIAGNOSIS — C79.51 PROSTATE CANCER METASTATIC TO BONE (HCC): ICD-10-CM

## 2025-07-05 DIAGNOSIS — C61 PROSTATE CANCER METASTATIC TO BONE (HCC): ICD-10-CM

## 2025-07-05 DIAGNOSIS — C61 MALIGNANT NEOPLASM OF PROSTATE (HCC): ICD-10-CM

## 2025-07-07 RX ORDER — ABIRATERONE ACETATE 250 MG/1
TABLET ORAL
Qty: 120 TABLET | Refills: 3 | Status: ACTIVE | OUTPATIENT
Start: 2025-07-07

## 2025-07-10 ENCOUNTER — HOSPITAL ENCOUNTER (OUTPATIENT)
Dept: PET IMAGING | Age: 60
Discharge: HOME | End: 2025-07-10
Attending: INTERNAL MEDICINE
Payer: MEDICARE

## 2025-07-10 DIAGNOSIS — C61 MALIGNANT NEOPLASM OF PROSTATE (HCC): ICD-10-CM

## 2025-07-10 DIAGNOSIS — C61 PROSTATE CANCER METASTATIC TO BONE (HCC): ICD-10-CM

## 2025-07-10 DIAGNOSIS — C79.51 PROSTATE CANCER METASTATIC TO BONE (HCC): ICD-10-CM

## 2025-07-10 PROCEDURE — 78815 PET IMAGE W/CT SKULL-THIGH: CPT

## 2025-07-10 PROCEDURE — A9595 HC RX DIAGNOSTIC RADIOPHARMACEUTICAL: HCPCS | Performed by: INTERNAL MEDICINE

## 2025-07-10 PROCEDURE — 3430000000 HC RX DIAGNOSTIC RADIOPHARMACEUTICAL: Performed by: INTERNAL MEDICINE

## 2025-07-10 PROCEDURE — 2500000003 HC RX 250 WO HCPCS: Performed by: INTERNAL MEDICINE

## 2025-07-10 RX ORDER — SODIUM CHLORIDE 0.9 % (FLUSH) 0.9 %
10 SYRINGE (ML) INJECTION PRN
Status: COMPLETED | OUTPATIENT
Start: 2025-07-10 | End: 2025-07-10

## 2025-07-10 RX ADMIN — PIFLUFOLASTAT F-18 9.15 MILLICURIE: 80 INJECTION INTRAVENOUS at 11:05

## 2025-07-10 RX ADMIN — SODIUM CHLORIDE, PRESERVATIVE FREE 10 ML: 5 INJECTION INTRAVENOUS at 11:05

## 2025-07-11 ENCOUNTER — HOSPITAL ENCOUNTER (OUTPATIENT)
Dept: INFUSION THERAPY | Age: 60
Discharge: HOME | End: 2025-07-11
Payer: MEDICARE

## 2025-07-11 DIAGNOSIS — C79.51 PROSTATE CANCER METASTATIC TO BONE (HCC): ICD-10-CM

## 2025-07-11 DIAGNOSIS — C61 MALIGNANT NEOPLASM OF PROSTATE (HCC): ICD-10-CM

## 2025-07-11 DIAGNOSIS — C61 PROSTATE CANCER METASTATIC TO BONE (HCC): ICD-10-CM

## 2025-07-11 LAB
ALBUMIN SERPL-MCNC: 4.1 G/DL (ref 3.4–5)
ALBUMIN/GLOB SERPL: 1.6 {RATIO} (ref 1.1–2.2)
ALP SERPL-CCNC: 77 U/L (ref 40–129)
ALT SERPL-CCNC: 32 U/L (ref 10–40)
ANION GAP SERPL CALCULATED.3IONS-SCNC: 13 MMOL/L (ref 9–17)
AST SERPL-CCNC: 29 U/L (ref 15–37)
BASOPHILS # BLD: 0.02 K/UL
BASOPHILS NFR BLD: 0 % (ref 0–1)
BILIRUB SERPL-MCNC: 0.4 MG/DL (ref 0–1)
BUN SERPL-MCNC: 13 MG/DL (ref 7–20)
CALCIUM SERPL-MCNC: 9.8 MG/DL (ref 8.3–10.6)
CHLORIDE SERPL-SCNC: 104 MMOL/L (ref 99–110)
CO2 SERPL-SCNC: 22 MMOL/L (ref 21–32)
CREAT SERPL-MCNC: 0.4 MG/DL (ref 0.8–1.3)
EOSINOPHIL # BLD: 0.15 K/UL
EOSINOPHILS RELATIVE PERCENT: 2 % (ref 0–3)
ERYTHROCYTE [DISTWIDTH] IN BLOOD BY AUTOMATED COUNT: 14.8 % (ref 11.7–14.9)
FERRITIN SERPL-MCNC: 270 NG/ML (ref 30–400)
GFR, ESTIMATED: >90 ML/MIN/1.73M2
GLUCOSE SERPL-MCNC: 110 MG/DL (ref 74–99)
HCT VFR BLD AUTO: 39.4 % (ref 42–52)
HGB BLD-MCNC: 12.6 G/DL (ref 13.5–18)
IRON SATN MFR SERPL: 18 % (ref 15–50)
IRON SERPL-MCNC: 53 UG/DL (ref 59–158)
LYMPHOCYTES NFR BLD: 1.25 K/UL
LYMPHOCYTES RELATIVE PERCENT: 20 % (ref 24–44)
MCH RBC QN AUTO: 29.9 PG (ref 27–31)
MCHC RBC AUTO-ENTMCNC: 32 G/DL (ref 32–36)
MCV RBC AUTO: 93.6 FL (ref 78–100)
MONOCYTES NFR BLD: 0.33 K/UL
MONOCYTES NFR BLD: 5 % (ref 0–5)
NEUTROPHILS NFR BLD: 72 % (ref 36–66)
NEUTS SEG NFR BLD: 4.43 K/UL
PLATELET # BLD AUTO: 168 K/UL (ref 140–440)
PMV BLD AUTO: 11.2 FL (ref 7.5–11.1)
POTASSIUM SERPL-SCNC: 4.4 MMOL/L (ref 3.5–5.1)
PROT SERPL-MCNC: 6.7 G/DL (ref 6.4–8.2)
RBC # BLD AUTO: 4.21 M/UL (ref 4.6–6.2)
SODIUM SERPL-SCNC: 139 MMOL/L (ref 136–145)
TIBC SERPL-MCNC: 289 UG/DL (ref 260–445)
UNSATURATED IRON BINDING CAPACITY: 236 UG/DL (ref 110–370)
WBC OTHER # BLD: 6.2 K/UL (ref 4–10.5)

## 2025-07-11 PROCEDURE — 84630 ASSAY OF ZINC: CPT

## 2025-07-11 PROCEDURE — 80053 COMPREHEN METABOLIC PANEL: CPT

## 2025-07-11 PROCEDURE — 83550 IRON BINDING TEST: CPT

## 2025-07-11 PROCEDURE — 85025 COMPLETE CBC W/AUTO DIFF WBC: CPT

## 2025-07-11 PROCEDURE — 83540 ASSAY OF IRON: CPT

## 2025-07-11 PROCEDURE — 36415 COLL VENOUS BLD VENIPUNCTURE: CPT

## 2025-07-11 PROCEDURE — 82728 ASSAY OF FERRITIN: CPT

## 2025-07-13 LAB — ZINC SERPL-MCNC: 76.8 UG/DL (ref 60–120)

## 2025-07-17 ENCOUNTER — OFFICE VISIT (OUTPATIENT)
Dept: ONCOLOGY | Age: 60
End: 2025-07-17
Payer: MEDICARE

## 2025-07-17 ENCOUNTER — HOSPITAL ENCOUNTER (OUTPATIENT)
Dept: INFUSION THERAPY | Age: 60
Discharge: HOME | End: 2025-07-17
Payer: MEDICARE

## 2025-07-17 VITALS
OXYGEN SATURATION: 100 % | HEIGHT: 69 IN | DIASTOLIC BLOOD PRESSURE: 70 MMHG | HEART RATE: 62 BPM | SYSTOLIC BLOOD PRESSURE: 123 MMHG | TEMPERATURE: 97.5 F | BODY MASS INDEX: 25.1 KG/M2

## 2025-07-17 DIAGNOSIS — C79.51 PROSTATE CANCER METASTATIC TO BONE (HCC): ICD-10-CM

## 2025-07-17 DIAGNOSIS — C61 PROSTATE CANCER METASTATIC TO BONE (HCC): ICD-10-CM

## 2025-07-17 DIAGNOSIS — T88.7XXA DRUG SIDE EFFECTS: Primary | ICD-10-CM

## 2025-07-17 LAB — PSA SERPL-MCNC: <0.01 NG/ML (ref 0–4)

## 2025-07-17 PROCEDURE — 1036F TOBACCO NON-USER: CPT | Performed by: INTERNAL MEDICINE

## 2025-07-17 PROCEDURE — 99213 OFFICE O/P EST LOW 20 MIN: CPT | Performed by: INTERNAL MEDICINE

## 2025-07-17 PROCEDURE — G0103 PSA SCREENING: HCPCS

## 2025-07-17 PROCEDURE — G8419 CALC BMI OUT NRM PARAM NOF/U: HCPCS | Performed by: INTERNAL MEDICINE

## 2025-07-17 PROCEDURE — 3017F COLORECTAL CA SCREEN DOC REV: CPT | Performed by: INTERNAL MEDICINE

## 2025-07-17 PROCEDURE — 36415 COLL VENOUS BLD VENIPUNCTURE: CPT

## 2025-07-17 PROCEDURE — G8427 DOCREV CUR MEDS BY ELIG CLIN: HCPCS | Performed by: INTERNAL MEDICINE

## 2025-07-17 PROCEDURE — 99212 OFFICE O/P EST SF 10 MIN: CPT

## 2025-07-17 ASSESSMENT — PATIENT HEALTH QUESTIONNAIRE - PHQ9
SUM OF ALL RESPONSES TO PHQ QUESTIONS 1-9: 0
SUM OF ALL RESPONSES TO PHQ QUESTIONS 1-9: 0
2. FEELING DOWN, DEPRESSED OR HOPELESS: NOT AT ALL
SUM OF ALL RESPONSES TO PHQ QUESTIONS 1-9: 0
SUM OF ALL RESPONSES TO PHQ QUESTIONS 1-9: 0
1. LITTLE INTEREST OR PLEASURE IN DOING THINGS: NOT AT ALL

## 2025-07-17 NOTE — PROGRESS NOTES
MA/LPN Rooming Questions  Patient: Scot Durand  MRN: [id number]    Date: 7/17/2025        1. Do you have any new issues?   no         2. Do you need any refills on medications?    no    3. Have you had any imaging done since your last visit?   yes - labs 7/11 psma petct 7/10    4. Have you been hospitalized or seen in the emergency room since your last visit here?   no    5. Did the patient have a depression screening completed today? Yes    PHQ-9 Total Score: 0 (7/17/2025 11:18 AM)       PHQ-9 Given to (if applicable):               PHQ-9 Score (if applicable):                     [] Positive     []  Negative              Does question #9 need addressed (if applicable)                     [] Yes    []  No               Elaine Liu, CMA

## 2025-07-22 ENCOUNTER — CLINICAL DOCUMENTATION (OUTPATIENT)
Dept: ONCOLOGY | Age: 60
End: 2025-07-22

## 2025-07-22 DIAGNOSIS — T38.7X5A ANDROGEN-INDUCED OSTEOPOROSIS: Primary | ICD-10-CM

## 2025-07-22 DIAGNOSIS — M81.8 ANDROGEN-INDUCED OSTEOPOROSIS: Primary | ICD-10-CM

## 2025-08-04 PROCEDURE — 93295 DEV INTERROG REMOTE 1/2/MLT: CPT | Performed by: INTERNAL MEDICINE

## 2025-08-04 PROCEDURE — 93296 REM INTERROG EVL PM/IDS: CPT | Performed by: INTERNAL MEDICINE

## 2025-08-07 ENCOUNTER — CLINICAL DOCUMENTATION (OUTPATIENT)
Dept: ONCOLOGY | Age: 60
End: 2025-08-07

## 2025-08-11 ENCOUNTER — TELEPHONE (OUTPATIENT)
Dept: CARDIOLOGY CLINIC | Age: 60
End: 2025-08-11

## 2025-08-21 ENCOUNTER — OFFICE VISIT (OUTPATIENT)
Age: 60
End: 2025-08-21

## 2025-08-21 VITALS
HEIGHT: 69 IN | HEART RATE: 62 BPM | BODY MASS INDEX: 25.18 KG/M2 | DIASTOLIC BLOOD PRESSURE: 78 MMHG | WEIGHT: 170 LBS | SYSTOLIC BLOOD PRESSURE: 120 MMHG

## 2025-08-21 DIAGNOSIS — Z95.810 BIVENTRICULAR ICD (IMPLANTABLE CARDIOVERTER-DEFIBRILLATOR) IN PLACE: Primary | ICD-10-CM

## 2025-08-21 DIAGNOSIS — I42.8 NONISCHEMIC CARDIOMYOPATHY (HCC): ICD-10-CM

## 2025-08-21 ASSESSMENT — ENCOUNTER SYMPTOMS
COUGH: 0
SINUS PAIN: 0
WHEEZING: 0
COLOR CHANGE: 0
DIARRHEA: 0
SINUS PRESSURE: 0
BACK PAIN: 0
PHOTOPHOBIA: 0
CHEST TIGHTNESS: 0
SHORTNESS OF BREATH: 0
BLOOD IN STOOL: 0
ABDOMINAL PAIN: 0
ABDOMINAL DISTENTION: 0

## 2025-08-28 DIAGNOSIS — Z95.810 BIVENTRICULAR ICD (IMPLANTABLE CARDIOVERTER-DEFIBRILLATOR) IN PLACE: Primary | ICD-10-CM

## (undated) DEVICE — VALVED PEELABLE PTFE INTRODUCER: Brand: OPTISEAL™

## (undated) DEVICE — SUTURE VICRYL SZ 4-0 L18IN ABSRB UD L16MM PS-4 1/2 CIR PRIM J507G

## (undated) DEVICE — SUTURE ETHIBOND EXCEL SZ 0 L30IN NONABSORBABLE GRN CT1 L36MM X424H

## (undated) DEVICE — INTRODUCER SHTH HEMSTAS VLV 0.038 IN 5 FRX12 CM 406100

## (undated) DEVICE — Device: Brand: LLD EZ LEAD LOCKING DEVICE

## (undated) DEVICE — MINI STICK MICRO ACCESS 4FR

## (undated) DEVICE — SUTURE VICRYL SZ 2-0 L27IN ABSRB UD L26MM CT-2 1/2 CIR J269H

## (undated) DEVICE — Device: Brand: TIGHTRAIL SUB-C ROTATING DILATOR SHEATH

## (undated) DEVICE — PERCUTANEOUS ENTRY THINWALL NEEDLE  ONE-PART: Brand: COOK

## (undated) DEVICE — SURGICAL PROCEDURE PACK CARD IMPL LOOP KT CUST LTX

## (undated) DEVICE — LEAD CUTTER: Brand: LEAD CUTTER

## (undated) DEVICE — Device

## (undated) DEVICE — SUTURE ABSORBABLE BRAIDED 2-0 CT-1 27 IN UD VICRYL J259H

## (undated) DEVICE — LIQUIBAND RAPID ADHESIVE 36/CS 0.8ML: Brand: MEDLINE